# Patient Record
Sex: MALE | Race: OTHER | NOT HISPANIC OR LATINO | Employment: OTHER | URBAN - METROPOLITAN AREA
[De-identification: names, ages, dates, MRNs, and addresses within clinical notes are randomized per-mention and may not be internally consistent; named-entity substitution may affect disease eponyms.]

---

## 2019-06-18 ENCOUNTER — OFFICE VISIT (OUTPATIENT)
Dept: DERMATOLOGY | Facility: CLINIC | Age: 64
End: 2019-06-18
Payer: COMMERCIAL

## 2019-06-18 VITALS — TEMPERATURE: 97.3 F | WEIGHT: 187 LBS | BODY MASS INDEX: 27.7 KG/M2 | HEIGHT: 69 IN

## 2019-06-18 DIAGNOSIS — D48.5 NEOPLASM OF UNCERTAIN BEHAVIOR OF SKIN: Primary | ICD-10-CM

## 2019-06-18 DIAGNOSIS — D23.9 DERMATOFIBROMA: ICD-10-CM

## 2019-06-18 DIAGNOSIS — L82.1 SEBORRHEIC KERATOSIS: ICD-10-CM

## 2019-06-18 PROCEDURE — 99204 OFFICE O/P NEW MOD 45 MIN: CPT | Performed by: DERMATOLOGY

## 2019-06-18 PROCEDURE — 88304 TISSUE EXAM BY PATHOLOGIST: CPT | Performed by: PATHOLOGY

## 2019-06-18 PROCEDURE — 11102 TANGNTL BX SKIN SINGLE LES: CPT | Performed by: DERMATOLOGY

## 2019-06-18 RX ORDER — ASPIRIN 81 MG/1
81 TABLET ORAL DAILY
COMMUNITY

## 2019-06-18 RX ORDER — NITROGLYCERIN 0.4 MG/1
TABLET SUBLINGUAL
Refills: 0 | COMMUNITY
Start: 2019-06-03

## 2019-06-18 RX ORDER — LISINOPRIL 2.5 MG/1
5 TABLET ORAL DAILY
Refills: 0 | COMMUNITY
Start: 2019-06-03 | End: 2021-02-11

## 2019-06-18 RX ORDER — ROSUVASTATIN CALCIUM 40 MG/1
40 TABLET, COATED ORAL DAILY
Refills: 0 | COMMUNITY
Start: 2019-05-21

## 2019-06-18 RX ORDER — EZETIMIBE 10 MG/1
10 TABLET ORAL DAILY
Refills: 0 | COMMUNITY
Start: 2019-06-03

## 2019-06-18 RX ORDER — METOPROLOL SUCCINATE 50 MG/1
TABLET, EXTENDED RELEASE ORAL
Refills: 0 | COMMUNITY
Start: 2019-03-29 | End: 2021-02-11 | Stop reason: SDUPTHER

## 2019-11-21 ENCOUNTER — TRANSCRIBE ORDERS (OUTPATIENT)
Dept: LAB | Facility: CLINIC | Age: 64
End: 2019-11-21

## 2019-11-21 ENCOUNTER — APPOINTMENT (OUTPATIENT)
Dept: LAB | Facility: CLINIC | Age: 64
End: 2019-11-21
Payer: COMMERCIAL

## 2019-11-21 DIAGNOSIS — I25.10 ASCVD (ARTERIOSCLEROTIC CARDIOVASCULAR DISEASE): Primary | ICD-10-CM

## 2019-11-21 DIAGNOSIS — I25.10 ASCVD (ARTERIOSCLEROTIC CARDIOVASCULAR DISEASE): ICD-10-CM

## 2019-11-21 LAB
ALBUMIN SERPL BCP-MCNC: 3.9 G/DL (ref 3.5–5)
ALP SERPL-CCNC: 59 U/L (ref 46–116)
ALT SERPL W P-5'-P-CCNC: 42 U/L (ref 12–78)
ANION GAP SERPL CALCULATED.3IONS-SCNC: 4 MMOL/L (ref 4–13)
AST SERPL W P-5'-P-CCNC: 24 U/L (ref 5–45)
BASOPHILS # BLD AUTO: 0.08 THOUSANDS/ΜL (ref 0–0.1)
BASOPHILS NFR BLD AUTO: 1 % (ref 0–1)
BILIRUB SERPL-MCNC: 0.81 MG/DL (ref 0.2–1)
BUN SERPL-MCNC: 14 MG/DL (ref 5–25)
CALCIUM SERPL-MCNC: 9.2 MG/DL (ref 8.3–10.1)
CHLORIDE SERPL-SCNC: 106 MMOL/L (ref 100–108)
CHOLEST SERPL-MCNC: 133 MG/DL (ref 50–200)
CO2 SERPL-SCNC: 29 MMOL/L (ref 21–32)
CREAT SERPL-MCNC: 0.98 MG/DL (ref 0.6–1.3)
EOSINOPHIL # BLD AUTO: 0.18 THOUSAND/ΜL (ref 0–0.61)
EOSINOPHIL NFR BLD AUTO: 2 % (ref 0–6)
ERYTHROCYTE [DISTWIDTH] IN BLOOD BY AUTOMATED COUNT: 13.2 % (ref 11.6–15.1)
GFR SERPL CREATININE-BSD FRML MDRD: 81 ML/MIN/1.73SQ M
GLUCOSE P FAST SERPL-MCNC: 108 MG/DL (ref 65–99)
HCT VFR BLD AUTO: 47.7 % (ref 36.5–49.3)
HDLC SERPL-MCNC: 48 MG/DL
HGB BLD-MCNC: 16.8 G/DL (ref 12–17)
IMM GRANULOCYTES # BLD AUTO: 0.01 THOUSAND/UL (ref 0–0.2)
IMM GRANULOCYTES NFR BLD AUTO: 0 % (ref 0–2)
LDLC SERPL CALC-MCNC: 64 MG/DL (ref 0–100)
LDLC SERPL DIRECT ASSAY-MCNC: 65 MG/DL (ref 0–100)
LYMPHOCYTES # BLD AUTO: 2.24 THOUSANDS/ΜL (ref 0.6–4.47)
LYMPHOCYTES NFR BLD AUTO: 30 % (ref 14–44)
MCH RBC QN AUTO: 29.9 PG (ref 26.8–34.3)
MCHC RBC AUTO-ENTMCNC: 35.2 G/DL (ref 31.4–37.4)
MCV RBC AUTO: 85 FL (ref 82–98)
MONOCYTES # BLD AUTO: 0.61 THOUSAND/ΜL (ref 0.17–1.22)
MONOCYTES NFR BLD AUTO: 8 % (ref 4–12)
NEUTROPHILS # BLD AUTO: 4.26 THOUSANDS/ΜL (ref 1.85–7.62)
NEUTS SEG NFR BLD AUTO: 59 % (ref 43–75)
NONHDLC SERPL-MCNC: 85 MG/DL
NRBC BLD AUTO-RTO: 0 /100 WBCS
PLATELET # BLD AUTO: 200 THOUSANDS/UL (ref 149–390)
PMV BLD AUTO: 10 FL (ref 8.9–12.7)
POTASSIUM SERPL-SCNC: 4.3 MMOL/L (ref 3.5–5.3)
PROT SERPL-MCNC: 6.9 G/DL (ref 6.4–8.2)
RBC # BLD AUTO: 5.61 MILLION/UL (ref 3.88–5.62)
SODIUM SERPL-SCNC: 139 MMOL/L (ref 136–145)
TRIGL SERPL-MCNC: 107 MG/DL
WBC # BLD AUTO: 7.38 THOUSAND/UL (ref 4.31–10.16)

## 2019-11-21 PROCEDURE — 36415 COLL VENOUS BLD VENIPUNCTURE: CPT

## 2019-11-21 PROCEDURE — 80053 COMPREHEN METABOLIC PANEL: CPT

## 2019-11-21 PROCEDURE — 80061 LIPID PANEL: CPT

## 2019-11-21 PROCEDURE — 85025 COMPLETE CBC W/AUTO DIFF WBC: CPT

## 2019-11-21 PROCEDURE — 83721 ASSAY OF BLOOD LIPOPROTEIN: CPT

## 2019-12-17 ENCOUNTER — OFFICE VISIT (OUTPATIENT)
Dept: FAMILY MEDICINE CLINIC | Facility: CLINIC | Age: 64
End: 2019-12-17
Payer: COMMERCIAL

## 2019-12-17 VITALS
SYSTOLIC BLOOD PRESSURE: 128 MMHG | BODY MASS INDEX: 38.68 KG/M2 | HEIGHT: 60 IN | OXYGEN SATURATION: 97 % | RESPIRATION RATE: 16 BRPM | WEIGHT: 197 LBS | DIASTOLIC BLOOD PRESSURE: 82 MMHG | HEART RATE: 89 BPM | TEMPERATURE: 97.7 F

## 2019-12-17 DIAGNOSIS — E78.5 HYPERLIPIDEMIA, UNSPECIFIED HYPERLIPIDEMIA TYPE: ICD-10-CM

## 2019-12-17 DIAGNOSIS — K40.90 LEFT INGUINAL HERNIA: ICD-10-CM

## 2019-12-17 DIAGNOSIS — Z00.00 WELCOME TO MEDICARE PREVENTIVE VISIT: Primary | ICD-10-CM

## 2019-12-17 DIAGNOSIS — I25.10 CORONARY ARTERY DISEASE INVOLVING NATIVE CORONARY ARTERY OF NATIVE HEART WITHOUT ANGINA PECTORIS: ICD-10-CM

## 2019-12-17 DIAGNOSIS — Z95.1 HX OF CABG: ICD-10-CM

## 2019-12-17 DIAGNOSIS — Z23 IMMUNIZATION DUE: ICD-10-CM

## 2019-12-17 DIAGNOSIS — Z12.5 PROSTATE CANCER SCREENING: ICD-10-CM

## 2019-12-17 DIAGNOSIS — Z95.5 H/O HEART ARTERY STENT: ICD-10-CM

## 2019-12-17 DIAGNOSIS — R73.03 PREDIABETES: ICD-10-CM

## 2019-12-17 PROCEDURE — 4040F PNEUMOC VAC/ADMIN/RCVD: CPT | Performed by: FAMILY MEDICINE

## 2019-12-17 PROCEDURE — 90670 PCV13 VACCINE IM: CPT | Performed by: FAMILY MEDICINE

## 2019-12-17 PROCEDURE — G0009 ADMIN PNEUMOCOCCAL VACCINE: HCPCS | Performed by: FAMILY MEDICINE

## 2019-12-17 PROCEDURE — 99203 OFFICE O/P NEW LOW 30 MIN: CPT | Performed by: FAMILY MEDICINE

## 2019-12-17 PROCEDURE — 3008F BODY MASS INDEX DOCD: CPT | Performed by: FAMILY MEDICINE

## 2019-12-17 PROCEDURE — 3725F SCREEN DEPRESSION PERFORMED: CPT | Performed by: FAMILY MEDICINE

## 2019-12-17 PROCEDURE — G0402 INITIAL PREVENTIVE EXAM: HCPCS | Performed by: FAMILY MEDICINE

## 2019-12-17 NOTE — PROGRESS NOTES
Assessment/Plan:    No problem-specific Assessment & Plan notes found for this encounter  Prediabetes at 89 advised  Recent labs reviewed  Add psa and advise yearly    Declines local cardiologist  If desired and stable, could offer refills for bp and chol meds with labs and f/u in office q6m    CAD/stents stable  Monitor left inguinal hernia but offer surgeon if interested     Diagnoses and all orders for this visit:    Prediabetes    Hyperlipidemia, unspecified hyperlipidemia type    Coronary artery disease involving native coronary artery of native heart without angina pectoris    H/O heart artery stent    Hx of CABG    Prostate cancer screening  -     PSA, Total Screen; Future    Immunization due  -     PNEUMOCOCCAL CONJUGATE VACCINE 13-VALENT GREATER THAN 6 MONTHS    Left inguinal hernia    Welcome to Medicare preventive visit        No follow-ups on file  Subjective:      Patient ID: Landry Cassidy is a 71 y o  male  Chief Complaint   Patient presents with    New patient     wmcma       HPI  No htn, lisinopril and toprol are for his heart per pt  Dr Elina Grey in [facility] cardiology  Sees yearly    No pcp  Was living in [place]  Has f/u for bp since [place] [facility] cardio visit    Medicare at 71 on August 2nd, 4m ago    Last colonoscopy in 2015 [place], normal  Last psa? Had flu shot last month  No pneumonia shots in past    Exercises often, walks  Some weights  Low fat diet    No nocturia usually    Declines eye exam    The following portions of the patient's history were reviewed and updated as appropriate: allergies, current medications, past family history, past medical history, past social history, past surgical history and problem list     Review of Systems   Respiratory: Negative for cough and shortness of breath  Cardiovascular: Negative for chest pain and leg swelling  Gastrointestinal: Negative for blood in stool  Genitourinary: Negative for hematuria           Current Outpatient Medications   Medication Sig Dispense Refill    aspirin (ECOTRIN LOW STRENGTH) 81 mg EC tablet Take 81 mg by mouth daily      ezetimibe (ZETIA) 10 mg tablet Take 10 mg by mouth daily  0    lisinopril (ZESTRIL) 2 5 mg tablet Take 2 5 mg by mouth daily  0    metoprolol succinate (TOPROL-XL) 50 mg 24 hr tablet   0    rosuvastatin (CRESTOR) 40 MG tablet Take 40 mg by mouth daily  0    nitroglycerin (NITROSTAT) 0 4 mg SL tablet place 1 tablet under the tongue if needed  0     No current facility-administered medications for this visit  Objective:    /82   Pulse 89   Temp 97 7 °F (36 5 °C)   Resp 16   Ht 5' (1 524 m)   Wt 89 4 kg (197 lb)   SpO2 97%   BMI 38 47 kg/m²        Physical Exam   Constitutional: He appears well-developed  No distress  HENT:   Head: Normocephalic  Right Ear: External ear normal    Left Ear: External ear normal    Nose: Nose normal    Mouth/Throat: Oropharynx is clear and moist  No oropharyngeal exudate  Eyes: Conjunctivae are normal  No scleral icterus  Neck: Neck supple  Carotid bruit is not present  Cardiovascular: Normal rate, regular rhythm and intact distal pulses  No murmur heard  Pulmonary/Chest: Effort normal and breath sounds normal  No respiratory distress  He has no wheezes  Abdominal: Soft  Bowel sounds are normal  He exhibits no mass  There is no tenderness  There is no guarding  Genitourinary: Rectum normal, prostate normal and penis normal    Musculoskeletal: He exhibits no edema or deformity  Neurological: He is alert  He exhibits normal muscle tone  Skin: Skin is warm and dry  Capillary refill takes less than 2 seconds  No pallor  Psychiatric: He has a normal mood and affect  His behavior is normal  Thought content normal    Nursing note and vitals reviewed               Deep Brandt DO

## 2019-12-17 NOTE — PATIENT INSTRUCTIONS
Please get us a copy of your last colonoscopy report and any immunizations you've had    Medicare Preventive Visit Patient Instructions  Thank you for completing your Welcome to Medicare Visit or Medicare Annual Wellness Visit today  Your next wellness visit will be due in one year (12/18/2020)  The screening/preventive services that you may require over the next 5-10 years are detailed below  Some tests may not apply to you based off risk factors and/or age  Screening tests ordered at today's visit but not completed yet may show as past due  Also, please note that scanned in results may not display below  Preventive Screenings:  Service Recommendations Previous Testing/Comments   Colorectal Cancer Screening  · Colonoscopy    · Fecal Occult Blood Test (FOBT)/Fecal Immunochemical Test (FIT)  · Fecal DNA/Cologuard Test  · Flexible Sigmoidoscopy Age: 54-65 years old   Colonoscopy: every 10 years (May be performed more frequently if at higher risk)  OR  FOBT/FIT: every 1 year  OR  Cologuard: every 3 years  OR  Sigmoidoscopy: every 5 years  Screening may be recommended earlier than age 48 if at higher risk for colorectal cancer  Also, an individualized decision between you and your healthcare provider will decide whether screening between the ages of 74-80 would be appropriate   Colonoscopy: Not on file  FOBT/FIT: Not on file  Cologuard: Not on file  Sigmoidoscopy: Not on file    Risks and Benefits Discussed     Prostate Cancer Screening Individualized decision between patient and health care provider in men between ages of 53-78   Medicare will cover every 12 months beginning on the day after your 50th birthday PSA: No results in last 5 years     Risks and Benefits Discussed     Hepatitis C Screening Once for adults born between [facility]  More frequently in patients at high risk for Hepatitis C Hep C Antibody: Not on file    Patient Declines   Diabetes Screening 1-2 times per year if you're at risk for diabetes or have pre-diabetes Fasting glucose: 108 mg/dL   A1C: No results in last 5 years    Screening Current   Cholesterol Screening Once every 5 years if you don't have a lipid disorder  May order more often based on risk factors  Lipid panel: 11/21/2019    Screening Not Indicated  History Lipid Disorder      Other Preventive Screenings Covered by Medicare:  1  Abdominal Aortic Aneurysm (AAA) Screening: covered once if your at risk  You're considered to be at risk if you have a family history of AAA or a male between the age of 73-68 who smoking at least 100 cigarettes in your lifetime  2  Lung Cancer Screening: covers low dose CT scan once per year if you meet all of the following conditions: (1) Age 50-69; (2) No signs or symptoms of lung cancer; (3) Current smoker or have quit smoking within the last 15 years; (4) You have a tobacco smoking history of at least 30 pack years (packs per day x number of years you smoked); (5) You get a written order from a healthcare provider  3  Glaucoma Screening: covered annually if you're considered high risk: (1) You have diabetes OR (2) Family history of glaucoma OR (3)  aged 48 and older OR (3)  American aged 72 and older  3  Osteoporosis Screening: covered every 2 years if you meet one of the following conditions: (1) Have a vertebral abnormality; (2) On glucocorticoid therapy for more than 3 months; (3) Have primary hyperparathyroidism; (4) On osteoporosis medications and need to assess response to drug therapy  5  HIV Screening: covered annually if you're between the age of 12-76  Also covered annually if you are younger than 13 and older than 72 with risk factors for HIV infection  For pregnant patients, it is covered up to 3 times per pregnancy      Immunizations:  Immunization Recommendations   Influenza Vaccine Annual influenza vaccination during flu season is recommended for all persons aged >= 6 months who do not have contraindications Pneumococcal Vaccine (Prevnar and Pneumovax)  * Prevnar = PCV13  * Pneumovax = PPSV23 Adults 25-60 years old: 1-3 doses may be recommended based on certain risk factors  Adults 72 years old: Prevnar (PCV13) vaccine recommended followed by Pneumovax (PPSV23) vaccine  If already received PPSV23 since turning 65, then PCV13 recommended at least one year after PPSV23 dose  Hepatitis B Vaccine 3 dose series if at intermediate or high risk (ex: diabetes, end stage renal disease, liver disease)   Tetanus (Td) Vaccine - COST NOT COVERED BY MEDICARE PART B Following completion of primary series, a booster dose should be given every 10 years to maintain immunity against tetanus  Td may also be given as tetanus wound prophylaxis  Tdap Vaccine - COST NOT COVERED BY MEDICARE PART B Recommended at least once for all adults  For pregnant patients, recommended with each pregnancy  Shingles Vaccine (Shingrix) - COST NOT COVERED BY MEDICARE PART B  2 shot series recommended in those aged 48 and above     Health Maintenance Due:      Topic Date Due    Hepatitis C Screening  1955    CRC Screening: Colonoscopy  1955     Immunizations Due:      Topic Date Due    DTaP,Tdap,and Td Vaccines (1 - Tdap) 08/02/1965    Pneumococcal Vaccine: 65+ Years (1 of 2 - PCV13) 08/02/2019     Advance Directives   What are advance directives? Advance directives are legal documents that state your wishes and plans for medical care  These plans are made ahead of time in case you lose your ability to make decisions for yourself  Advance directives can apply to any medical decision, such as the treatments you want, and if you want to donate organs  What are the types of advance directives? There are many types of advance directives, and each state has rules about how to use them  You may choose a combination of any of the following:  · Living will: This is a written record of the treatment you want   You can also choose which treatments you do not want, which to limit, and which to stop at a certain time  This includes surgery, medicine, IV fluid, and tube feedings  · Durable power of  for healthcare [facility]): This is a written record that states who you want to make healthcare choices for you when you are unable to make them for yourself  This person, called a proxy, is usually a family member or a friend  You may choose more than 1 proxy  · Do not resuscitate (DNR) order:  A DNR order is used in case your heart stops beating or you stop breathing  It is a request not to have certain forms of treatment, such as CPR  A DNR order may be included in other types of advance directives  · Medical directive: This covers the care that you want if you are in a coma, near death, or unable to make decisions for yourself  You can list the treatments you want for each condition  Treatment may include pain medicine, surgery, blood transfusions, dialysis, IV or tube feedings, and a ventilator (breathing machine)  · Values history: This document has questions about your views, beliefs, and how you feel and think about life  This information can help others choose the care that you would choose  Why are advance directives important? An advance directive helps you control your care  Although spoken wishes may be used, it is better to have your wishes written down  Spoken wishes can be misunderstood, or not followed  Treatments may be given even if you do not want them  An advance directive may make it easier for your family to make difficult choices about your care  Weight Management   Why it is important to manage your weight:  Being overweight increases your risk of health conditions such as heart disease, high blood pressure, type 2 diabetes, and certain types of cancer  It can also increase your risk for osteoarthritis, sleep apnea, and other respiratory problems  Aim for a slow, steady weight loss   Even a small amount of weight loss can lower your risk of health problems  How to lose weight safely:  A safe and healthy way to lose weight is to eat fewer calories and get regular exercise  You can lose up about 1 pound a week by decreasing the number of calories you eat by 500 calories each day  Healthy meal plan for weight management:  A healthy meal plan includes a variety of foods, contains fewer calories, and helps you stay healthy  A healthy meal plan includes the following:  · Eat whole-grain foods more often  A healthy meal plan should contain fiber  Fiber is the part of grains, fruits, and vegetables that is not broken down by your body  Whole-grain foods are healthy and provide extra fiber in your diet  Some examples of whole-grain foods are whole-wheat breads and pastas, oatmeal, brown rice, and bulgur  · Eat a variety of vegetables every day  Include dark, leafy greens such as spinach, kale, federico greens, and mustard greens  Eat yellow and orange vegetables such as carrots, sweet potatoes, and winter squash  · Eat a variety of fruits every day  Choose fresh or canned fruit (canned in its own juice or light syrup) instead of juice  Fruit juice has very little or no fiber  · Eat low-fat dairy foods  Drink fat-free (skim) milk or 1% milk  Eat fat-free yogurt and low-fat cottage cheese  Try low-fat cheeses such as mozzarella and other reduced-fat cheeses  · Choose meat and other protein foods that are low in fat  Choose beans or other legumes such as split peas or lentils  Choose fish, skinless poultry (chicken or turkey), or lean cuts of red meat (beef or pork)  Before you cook meat or poultry, cut off any visible fat  · Use less fat and oil  Try baking foods instead of frying them  Add less fat, such as margarine, sour cream, regular salad dressing and mayonnaise to foods  Eat fewer high-fat foods  Some examples of high-fat foods include french fries, doughnuts, ice cream, and cakes  · Eat fewer sweets    Limit foods and drinks that are high in sugar  This includes candy, cookies, regular soda, and sweetened drinks  Exercise:  Exercise at least 30 minutes per day on most days of the week  Some examples of exercise include walking, biking, dancing, and swimming  You can also fit in more physical activity by taking the stairs instead of the elevator or parking farther away from stores  Ask your healthcare provider about the best exercise plan for you  © Copyright [employer] 2018 Information is for End User's use only and may not be sold, redistributed or otherwise used for commercial purposes   All illustrations and images included in CareNotes® are the copyrighted property of [employer]  or [address] [place] [address]

## 2019-12-18 ENCOUNTER — TELEPHONE (OUTPATIENT)
Dept: FAMILY MEDICINE CLINIC | Facility: CLINIC | Age: 64
End: 2019-12-18

## 2019-12-18 PROBLEM — Z00.00 WELCOME TO MEDICARE PREVENTIVE VISIT: Status: ACTIVE | Noted: 2019-12-18

## 2019-12-18 PROBLEM — Z23 IMMUNIZATION DUE: Status: ACTIVE | Noted: 2019-12-18

## 2019-12-18 NOTE — PROGRESS NOTES
Assessment and Plan:     Problem List Items Addressed This Visit        Active Problems    Coronary artery disease involving native coronary artery    H/O heart artery stent    Hx of CABG    Hyperlipidemia    Immunization due    Relevant Orders    PNEUMOCOCCAL CONJUGATE VACCINE 13-VALENT GREATER THAN 6 MONTHS (Completed)    Left inguinal hernia    Prediabetes    Prostate cancer screening    Relevant Orders    PSA, Total Screen    Welcome to Medicare preventive visit - Primary           Preventive health issues were discussed with patient, and age appropriate screening tests were ordered as noted in patient's After Visit Summary  Personalized health advice and appropriate referrals for health education or preventive services given if needed, as noted in patient's After Visit Summary  History of Present Illness:     Patient presents for Welcome to Medicare visit       Patient Care Team:  DO maegan Dang PCP - General (Family Medicine)     Review of Systems:     Review of Systems   Problem List:     Patient Active Problem List   Diagnosis    Coronary artery disease involving native coronary artery    H/O heart artery stent    Prediabetes    Hx of CABG    Hyperlipidemia    Prostate cancer screening    Left inguinal hernia    Immunization due    Welcome to [facility] preventive visit      Past Medical and Surgical History:     Past Medical History:   Diagnosis Date    Heart problem      Past Surgical History:   Procedure Laterality Date    ARTERIAL BYPASS SURGERY        Family History:     Family History   Problem Relation Age of Onset    Stroke Father     Rectal cancer Father     Alzheimer's disease Mother       Social History:     Social History     Socioeconomic History    Marital status: Single     Spouse name: None    Number of children: None    Years of education: None    Highest education level: None   Occupational History    None   Social Needs    Financial resource strain: None    Food insecurity:     Worry: None     Inability: None    Transportation needs:     Medical: None     Non-medical: None   Tobacco Use    Smoking status: Never Smoker    Smokeless tobacco: Never Used   Substance and Sexual Activity    Alcohol use: None    Drug use: None    Sexual activity: None   Lifestyle    Physical activity:     Days per week: None     Minutes per session: None    Stress: None   Relationships    Social connections:     Talks on phone: None     Gets together: None     Attends Hindu service: None     Active member of club or organization: None     Attends meetings of clubs or organizations: None     Relationship status: None    Intimate partner violence:     Fear of current or ex partner: None     Emotionally abused: None     Physically abused: None     Forced sexual activity: None   Other Topics Concern    None   Social History Narrative    None      Medications and Allergies:     Current Outpatient Medications   Medication Sig Dispense Refill    aspirin (ECOTRIN LOW STRENGTH) 81 mg EC tablet Take 81 mg by mouth daily      ezetimibe (ZETIA) 10 mg tablet Take 10 mg by mouth daily  0    lisinopril (ZESTRIL) 2 5 mg tablet Take 2 5 mg by mouth daily  0    metoprolol succinate (TOPROL-XL) 50 mg 24 hr tablet   0    rosuvastatin (CRESTOR) 40 MG tablet Take 40 mg by mouth daily  0    nitroglycerin (NITROSTAT) 0 4 mg SL tablet place 1 tablet under the tongue if needed  0     No current facility-administered medications for this visit        No Known Allergies   Immunizations:     Immunization History   Administered Date(s) Administered    Influenza, injectable, quadrivalent, preservative free 0 5 mL 10/25/2018    Pneumococcal Conjugate 13-Valent 12/17/2019    influenza, trivalent, adjuvanted 10/30/2019      Health Maintenance:         Topic Date Due    Hepatitis C Screening  1955    CRC Screening: Colonoscopy  1955         Topic Date Due    DTaP,Tdap,and Td Vaccines (1 - Tdap) 08/02/1965    Pneumococcal Vaccine: 65+ Years (1 of 2 - PCV13) 08/02/2019      Medicare Screening Tests and Risk Assessments:     Davis Huang is here for his Welcome to Medicare visit  Health Risk Assessment:   Patient rates overall health as good  Patient feels that their physical health rating is same  Eyesight was rated as slightly worse  Hearing was rated as same  Patient feels that their emotional and mental health rating is same  Pain experienced in the last 7 days has been some  Patient's pain rating has been 3/10  Patient states that he has experienced weight loss or gain in last 6 months  Depression Screening:   PHQ-2 Score: 0      Fall Risk Screening: In the past year, patient has experienced: no history of falling in past year      Home Safety:  Patient does not have trouble with stairs inside or outside of their home  Patient has working smoke alarms and has working carbon monoxide detector  Home safety hazards include: none  Nutrition:   Current diet is Regular  Medications:   Patient is currently taking over-the-counter supplements  OTC medications include: see medication list  Patient is able to manage medications  Activities of Daily Living (ADLs)/Instrumental Activities of Daily Living (IADLs):   Walk and transfer into and out of bed and chair?: Yes  Dress and groom yourself?: Yes    Bathe or shower yourself?: Yes    Feed yourself? Yes  Do your laundry/housekeeping?: Yes  Manage your money, pay your bills and track your expenses?: Yes  Make your own meals?: Yes    Do your own shopping?: Yes    Previous Hospitalizations:   Any hospitalizations or ED visits within the last 12 months?: No      Advance Care Planning:   Living will: Yes    Durable POA for healthcare:  Yes    Advanced directive: Yes    End of Life Decisions reviewed with patient: No      Cognitive Screening:   Provider or family/friend/caregiver concerned regarding cognition?: No    PREVENTIVE SCREENINGS Cardiovascular Screening:    General: Screening Not Indicated and History Lipid Disorder      Diabetes Screening:     General: Screening Current      Colorectal Cancer Screening:     General: Risks and Benefits Discussed      Prostate Cancer Screening:    General: Risks and Benefits Discussed    Due for: PSA      Osteoporosis Screening:    General: Screening Not Indicated      Abdominal Aortic Aneurysm (AAA) Screening:    Risk factors include: age between 73-69 yo        General: Screening Not Indicated      Lung Cancer Screening:     General: Screening Not Indicated      Hepatitis C Screening:    General: Patient Declines    Hep C Screening Accepted: No     Other Counseling Topics:   Alcohol use counseling  No exam data present     Physical Exam:     /82   Pulse 89   Temp 97 7 °F (36 5 °C)   Resp 16   Ht 5' (1 524 m)   Wt 89 4 kg (197 lb)   SpO2 97%   BMI 38 47 kg/m²     Physical Exam   Constitutional: He appears well-nourished  No distress  HENT:   Head: Normocephalic  Eyes: Pupils are equal, round, and reactive to light  Conjunctivae are normal    Neck: No thyromegaly present  Cardiovascular: Normal heart sounds and intact distal pulses  Pulmonary/Chest: No stridor  He has no wheezes  Abdominal: Bowel sounds are normal    Musculoskeletal: He exhibits no tenderness or deformity  Neurological: He exhibits normal muscle tone  Skin: Skin is warm and dry  Capillary refill takes less than 2 seconds  Psychiatric: He has a normal mood and affect       Declines vision exam    Yahir Pruett DO

## 2020-01-02 ENCOUNTER — APPOINTMENT (OUTPATIENT)
Dept: LAB | Facility: CLINIC | Age: 65
End: 2020-01-02
Payer: COMMERCIAL

## 2020-01-02 DIAGNOSIS — Z12.5 PROSTATE CANCER SCREENING: ICD-10-CM

## 2020-01-02 LAB — PSA SERPL-MCNC: 0.7 NG/ML (ref 0–4)

## 2020-01-02 PROCEDURE — G0103 PSA SCREENING: HCPCS

## 2020-01-02 PROCEDURE — 36415 COLL VENOUS BLD VENIPUNCTURE: CPT

## 2020-01-14 ENCOUNTER — TELEPHONE (OUTPATIENT)
Dept: FAMILY MEDICINE CLINIC | Facility: CLINIC | Age: 65
End: 2020-01-14

## 2020-01-14 NOTE — TELEPHONE ENCOUNTER
Message left on the test results hotline this am inquiring about his PSA results from 1/2/20  Dr Jose Irizarry sent him a MyChart message but Lopez Brooke is not able to get in his Mychart       Written by Denise Burroughs DO on 1/3/2020 11:10 PM   Prostate blood test is normal       [email or web address]

## 2020-05-18 ENCOUNTER — TELEPHONE (OUTPATIENT)
Dept: DERMATOLOGY | Facility: CLINIC | Age: 65
End: 2020-05-18

## 2020-07-15 ENCOUNTER — OFFICE VISIT (OUTPATIENT)
Dept: DERMATOLOGY | Facility: CLINIC | Age: 65
End: 2020-07-15
Payer: COMMERCIAL

## 2020-07-15 VITALS — BODY MASS INDEX: 20.39 KG/M2 | WEIGHT: 142.4 LBS | HEIGHT: 70 IN | TEMPERATURE: 97.7 F

## 2020-07-15 DIAGNOSIS — D22.9 MULTIPLE BENIGN MELANOCYTIC NEVI: ICD-10-CM

## 2020-07-15 DIAGNOSIS — L81.4 LENTIGO: ICD-10-CM

## 2020-07-15 DIAGNOSIS — D18.01 CHERRY ANGIOMA: ICD-10-CM

## 2020-07-15 DIAGNOSIS — L82.0 SEBORRHEIC KERATOSES, INFLAMED: Primary | ICD-10-CM

## 2020-07-15 PROCEDURE — 99213 OFFICE O/P EST LOW 20 MIN: CPT | Performed by: DERMATOLOGY

## 2020-07-15 PROCEDURE — 3008F BODY MASS INDEX DOCD: CPT | Performed by: DERMATOLOGY

## 2020-07-15 PROCEDURE — 17110 DESTRUCTION B9 LES UP TO 14: CPT | Performed by: DERMATOLOGY

## 2020-07-15 PROCEDURE — 1160F RVW MEDS BY RX/DR IN RCRD: CPT | Performed by: DERMATOLOGY

## 2020-07-15 NOTE — PATIENT INSTRUCTIONS
1  SEBORRHEIC KERATOSIS; INFLAMED    Assessment and Plan:  Based on a thorough discussion of this condition and the management approach to it (including a comprehensive discussion of the known risks, side effects and potential benefits of treatment), the patient (family) agrees to implement the following specific plan:   Reassured benign will treat the one on the chest with liquid nitrogen due to irritation   If Seborrheic keratosis does not resolved after treating with cryo therapy recommend coming in to have the lesion biopsied  Seborrheic Keratosis  A seborrheic keratosis is a harmless warty spot that appears during adult life as a common sign of skin aging  Seborrheic keratoses can arise on any area of skin, covered or uncovered, with the usual exception of the palms and soles  They do not arise from mucous membranes  Seborrheic keratoses can have highly variable appearance  Seborrheic keratoses are extremely common  It has been estimated that over 90% of adults over the age of 61 years have one or more of them  They occur in males and females of all races, typically beginning to erupt in the 35s or 45s  They are uncommon under the age of 21 years  The precise cause of seborrhoeic keratoses is not known  Seborrhoeic keratoses are considered degenerative in nature  As time goes by, seborrheic keratoses tend to become more numerous  Some people inherit a tendency to develop a very large number of them; some people may have hundreds of them  The name "seborrheic keratosis" is misleading, because these lesions are not limited to a seborrhoeic distribution (scalp, mid-face, chest, upper back), nor are they formed from sebaceous glands, nor are they associated with sebum -- which is greasy    Seborrheic keratosis may also be called "SK," "Seb K," "basal cell papilloma," "senile wart," or "barnacle "      Researchers have noted:   Eruptive seborrhoeic keratoses can follow sunburn or dermatitis   Skin friction may be the reason they appear in body folds   Viral cause (e g , human papillomavirus) seems unlikely   Stable and clonal mutations or activation of FRFR3, PIK3CA, MAURICIO, AKT1 and EGFR genes are found in seborrhoeic keratoses   Seborrhoeic keratosis can arise from solar lentigo   FRFR3 mutations also arise in solar lentigines  These mutations are associated with increased age and location on the head and neck, suggesting a role of ultraviolet radiation in these lesions   Seborrheic keratoses do not harbour tumour suppressor gene mutations   Epidermal growth factor receptor inhibitors, which are used to treat some cancers, often result in an increase in verrucal (warty) keratoses  There is no easy way to remove multiple lesions on a single occasion  Unless a specific lesion is "inflamed" and is causing pain or stinging/burning or is bleeding, most insurance companies do not authorize treatment  PROCEDURE:  DESTRUCTION OF BENIGN LESIONS  After a thorough discussion of treatment options and risk/benefits/alternatives (including but not limited to local pain, scarring, dyspigmentation, blistering, and possible superinfection), verbal and written consent were obtained and the aforementioned lesions were treated on with cryotherapy using liquid nitrogen x 1 cycle for 5-10 seconds  The patient tolerated the procedure well, and after-care instructions were provided  2  CHERRY ANGIOMAS    Assessment and Plan:  Based on a thorough discussion of this condition and the management approach to it (including a comprehensive discussion of the known risks, side effects and potential benefits of treatment), the patient (family) agrees to implement the following specific plan:   Reassured benign    Assessment and Plan:    Cherry angioma, also known as Denise Cabrera spots, are benign vascular skin lesions  A "cherry angioma" is a firm red, blue or purple papule, 0 1-1 cm in diameter   When thrombosed, they can appear black in colour until evaluated with a dermatoscope when the red or purple colour is more easily seen  Cherry angioma may develop on any part of the body but most often appear on the scalp, face, lips and trunk  An angioma is due to proliferating endothelial cells; these are the cells that line the inside of a blood vessel  Angiomas can arise in early life or later in life; the most common type of angioma is a cherry angioma  Cherry angiomas are very common in males and females of any age or race  They are more noticeable in white skin than in skin of colour  They markedly increase in number from about the age of 36  There may be a family history of similar lesions  Eruptive cherry angiomas have been rarely reported to be associated with internal malignancy  The cause of angiomas is unknown  Genetic analysis of cherry angiomas has shown that they frequently carry specific somatic missense mutations in the GNAQ and GNA11 (Q209H) genes, which are involved in other vascular and melanocytic proliferations  Cherry angioma is usually diagnosed clinically and no investigations are necessary for the majority of lesions  It has a characteristic red-clod or lobular pattern on dermatoscopy (called lacunar pattern using conventional pattern analysis)  When there is uncertainty about the diagnosis, a biopsy may be performed  The angioma is composed of venules in a thickened papillary dermis  Collagen bundles may be prominent between the lobules  Cherry angiomas are harmless, so they do not usually have to be treated  Occasionally, they are removed to exclude a malignant skin lesion such as a nodular melanoma or because they are irritated or bleeding (and a subsequent risk for infection)  To decrease friction over the lesions, we recommend Neutrogena Daily Defense SPF 50+ at least 3 times a day        3  MELANOCYTIC NEVI ("Moles")    Assessment and Plan:  Based on a thorough discussion of this condition and the management approach to it (including a comprehensive discussion of the known risks, side effects and potential benefits of treatment), the patient (family) agrees to implement the following specific plan:   Reassured benign,   When outside we recommend using a wide brim hat, sunglasses, long sleeve and pants, sunscreen with SPF 31+ with reapplication every 2 hours, or SPF specific clothing      Melanocytic Nevi  Melanocytic nevi ("moles") are caused by collections of the color producing skin cells, or melanocytes, in 1 area in the skin  They can range in color from pink to dark brown and be either raised or flat  Some moles are present at birth (I e , "congenital nevi"), while others come up later in life (i e , "acquired nevi")  [place] exposure also stimulates the body to make more moles, ie the more sun you get the more moles you'll grow  Clinically distinguishing a healthy mole from melanoma may be difficult  The "ABCDE's" of moles have been suggested as a means of helping to alert a person to a suspicious mole and the possible increased risk of melanoma  Asymmetry: Healthy moles tend to be symmetric, while melanomas are often asymmetric  Asymmetry means if you draw a line through the mole, the two halves do not match in color, size, shape, or surface texture Any mole that starts to demonstrate "asymmetry" should be examined promptly by a board certified dermatologist      Border: Healthy moles tend to have discrete, even borders  The border of a melanoma often blends into the normal skin and does not sharply delineate the mole from normal skin  Any mole that starts to demonstrate "uneven borders" should be examined promptly     Color: Healthy moles tend to be one color throughout  Melanomas tend to be made up of different colors ranging from dark black, blue, white, or red    Any mole that demonstrates a color change should be examined promptly    Diameter: Healthy moles tend to be smaller than 0 6 cm in size; an exception are "congenital nevi" that can be larger  Melanomas tend to grow and can often be greater than 0 6 cm (1/4 of an inch, or the size of a pencil eraser)  This is only a guideline, and many normal moles may be larger than 0 6 cm without being unhealthy  Any mole that starts to change in size (small to bigger or bigger to smaller) should be examined promptly    Evolving: Healthy moles tend to "stay the same "  Melanomas may often show signs of change or evolution such as a change in size, shape, color, or elevation  Any mole that starts to itch, bleed, crust, burn, hurt, or ulcerate or demonstrate a change or evolution should be examined promptly by a board certified dermatologist       What are atypical moles or dysplastic nevi? Dysplastic moles are moles that have some of the ABCDE  changes listed above but  are not cancerous  Sometimes a biopsy and microscopic examination are needed to determine the difference  They may indicate an increased risk of melanoma in that person, especially if there is a family history of melanoma  What is a Melanoma? The main concern when looking at a new or changing mole it to evaluate whether it may be a melanoma  The appearance of a "new mole" remains one of the most reliable methods for identifying a malignant melanoma  A melanoma is a type of skin cancer that can be deadly if it spreads throughout the body  The prognosis of a Melanoma depends on how deep it has penetrated in the skin  If caught early, they generally will not have had time to grow into the deeper layers of the skin and they cure rate is then very high  Once the melanoma grows deeper into the skin, the cure rate drops dramatically  Therefore, early detection and removal of a malignant melanoma results in a much better chance of complete cure       4  LENTIGO    Assessment and Plan:  Based on a thorough discussion of this condition and the management approach to it (including a comprehensive discussion of the known risks, side effects and potential benefits of treatment), the patient (family) agrees to implement the following specific plan:   Reassured benign   Recommend wearing sun protective clothing   Recommend continuing with yearly skin exam     What is a lentigo? A lentigo is a pigmented flat or slightly raised lesion with a clearly defined edge  Unlike an ephelis (freckle), it does not fade in the winter months  There are several kinds of lentigo  The name lentigo originally referred to its appearance resembling a small lentil  The plural of lentigo is lentigines, although lentigos is also in common use  Who gets lentigines? Lentigines can affect males and females of all ages and races  Solar lentigines are especially prevalent in fair skinned adults  Lentigines associated with syndromes are present at birth or arise during childhood  What causes lentigines? Common forms of lentigo are due to exposure to ultraviolet radiation:   Sun damage including sunburn    Indoor tanning    Phototherapy, especially photochemotherapy (PUVA)    Ionizing radiation, eg radiation therapy, can also cause lentigines  Several familial syndromes associated with widespread lentigines originate from mutations in Jona-MAP kinase, mTOR signaling and PTEN pathways  What are the clinical features of lentigines? Lentigines have been classified into several different types depending on what they look like, where they appear on the body, causative factors, and whether they are associated to other diseases or conditions  Lentigines may be solitary or more often, multiple  Most lentigines are smaller than 5 mm in diameter      Lentigo simplex   A precursor to junctional naevus    Arises during childhood and early adult life    Found on trunk and limbs    Small brown round or oval macule or thin plaque    Jagged or smooth edge    May have a dry surface    May disappear in time  Solar lentigo   A precursor to seborrhoeic keratosis    Found on chronically sun exposed sites such as hands, face, lower legs    May also follow sunburn to shoulders    Yellow, light or dark brown regular or irregular macule or thin plaque    May have a dry surface    Often has moth-eaten outline    Can slowly enlarge to several centimeters in diameter    May disappear, often through the process known as lichenoid keratosis    When atypical in appearance, may be difficult to distinguish from melanoma in situ  Ink spot lentigo   Also known as reticulated lentigo    Few in number compared to solar lentigines    Follows sunburn in very fair skinned individuals    Dark brown to black irregular ink spot-like macule  PUVA lentigo   Similar to ink spot lentigo but follows photochemotherapy (PUVA)    Location anywhere exposed to PUVA  Tanning bed lentigo   Similar to ink spot lentigo but follows indoor tanning    Location anywhere exposed to tanning bed  Radiation lentigo   Occurs in site of irradiation (accidental or therapeutic)    Associated with late-stage radiation dermatitis: epidermal atrophy, subcutaneous fibrosis, keratosis, telangiectasias  Melanotic macule   Mucosal surfaces or adjacent glabrous skin eg lip, vulva, penis, anus    Light to dark brown    Also called mucosal melanosis  Generalised lentigines   Found on any exposed or covered site from early childhood    Small macules may merge to form larger patches    Not associated with a syndrome    Also called lentigines profusa, multiple lentigines  Agminated lentigines   Naevoid eruption of lentigos confined to a single segmental area    Sharp demarcation in midline    May have associated neurological and developmental abnormalities  Patterned lentigines   Inherited tendency to lentigines on face, lips, buttocks, palms, soles    Recognised mainly in people of  ethnicity  Centrofacial neurodysraphic lentiginosis  Associated with mental retardation  Lentiginosis syndromes   Syndromes include LEOPARD/[place], Peutz-Jeghers, Laugier-Hunziker, Moynahan, Xeroderma pigmentosum, myxoma syndromes (HINSON, NAME, Walters), Ruvalcaba-Myhre-Barrow, Bannayan-Zonnana syndrome, Cowden disease (multiple hamartoma syndrome )    Inheritance is autosomal dominant; sporadic cases common    Widespread lentigines present at birth or arise in early childhood    Associated with neural, endocrine, and mesenchymal tumors    How is the diagnosis made? Lentigines are usually diagnosed clinically by their typical appearance  Concern regarding possibility of melanoma may lead to:   Dermatoscopy    Diagnostic excision biopsy    Histopathology of a lentigo shows:   Thickened epidermis    An increased number of melanocytes along the basal layer of epidermis    Unlike junctional melanocytic naevus, there are no nests of melanocytes    Increased melanin pigment within the keratinocytes    Additional features depending on type of lentigo    In contrast, an ephelis (freckle) shows sun-induced increased melanin within the keratinocytes, without an increase in number of cells  What is the treatment for lentigines? Most lentigines are left alone  Attempts to lighten them may not be successful  The following approaches are used:   SPF 50+ broad-spectrum sunscreen    Hydroquinone bleaching cream    Alpha hydroxy acids    Vitamin C    Retinoids    Azelaic acid    Chemical peels  Individual lesions can be permanently removed using:   Cryotherapy    Intense pulsed light    Pigment lasers    How can lentigines be prevented? Lentigines associated with exposure ultraviolet radiation can be prevented by very careful sun protection  Clothing is more successful at preventing new lentigines than are sunscreens  What is the outlook for lentigines? Lentigines usually persist  They may increase in number with age and sun exposure   Some in sun-protected sites may fade and disappear

## 2020-07-15 NOTE — PROGRESS NOTES
[facility] Dermatology [facility] Note     Patient Name: Chelsea Rodriguez  Encounter Date: 7 15 2020     Have you been cared for by a [facility] Dermatologist in the last 3 years and, if so, which one? YES, Dr Shruthi Mejia    · Have you traveled outside of the [address] [place] [address] in the past 3 months or outside of the [facility] area in the last 2 weeks? No     May we call your Preferred Phone number to discuss your specific medical information? Yes     May we leave a detailed message that includes your specific medical information? Yes      Today's Chief Concerns:   Concern #1:  Skin exam   Concern #2:  Some lesions on body of concern    Past Medical History:  Have you personally ever had or currently have any of the following? · Skin cancer (such as Melanoma, Basal Cell Carcinoma, Squamous Cell Carcinoma? (If Yes, please provide more detail)- No  · Eczema: No  · Psoriasis: No  · HIV/AIDS: No  · Hepatitis B or C: No  · Tuberculosis: No  · Systemic Immunosuppression such as Diabetes, Biologic or Immunotherapy, Chemotherapy, Organ Transplantation, Bone Marrow Transplantation (If YES, please provide more detail): No  · Radiation Treatment (If YES, please provide more detail): No  · Any other major medical conditions/concerns? (If Yes, which types)- YES, heart problems    Social History:     What is/was your primary occupation? retired     What are your hobbies/past-times? Skiing, mountain biking, golfing    Family History:  Have any of your "first degree relatives" (parent, brother, sister, or child) had any of the following       · Skin cancer such as Melanoma or Merkel Cell Carcinoma or Pancreatic Cancer? YES, skin cancer  · Eczema, Asthma, Hay Fever or Seasonal Allergies: No  · Psoriasis or Psoriatic Arthritis: No  · Do any other medical conditions seem to run in your family? If Yes, what condition and which relatives?   No    Current Medications:   (please update all dermatological medications before printing patient's AVS!)      Current Outpatient Medications:     aspirin (ECOTRIN LOW STRENGTH) 81 mg EC tablet, Take 81 mg by mouth daily, Disp: , Rfl:     ezetimibe (ZETIA) 10 mg tablet, Take 10 mg by mouth daily, Disp: , Rfl: 0    lisinopril (ZESTRIL) 2 5 mg tablet, Take 5 mg by mouth daily , Disp: , Rfl: 0    metoprolol succinate (TOPROL-XL) 50 mg 24 hr tablet, , Disp: , Rfl: 0    nitroglycerin (NITROSTAT) 0 4 mg SL tablet, , Disp: , Rfl: 0    rosuvastatin (CRESTOR) 40 MG tablet, Take 40 mg by mouth daily, Disp: , Rfl: 0      Review of Systems:  Have you recently had or currently have any of the following? If YES, what are you doing for the problem? · Fever, chills or unintended weight loss: No  · Sudden loss or change in your vision: No  · Nausea, vomiting or blood in your stool: No  · Painful or swollen joints: No  · Wheezing or cough: No  · Changing mole or non-healing wound: No  · Nosebleeds: No  · Excessive sweating: No  · Easy or prolonged bleeding? No  · Over the last 2 weeks, how often have you been bothered by the following problems? · Taking little interest or pleasure in doing things: 1 - Not at All  · Feeling down, depressed, or hopeless: 1 - Not at All  · Rapid heartbeat with epinephrine:  No    · FEMALES ONLY:    · Are you pregnant or planning to become pregnant? N/A  · Are you currently or planning to be nursing or breast feeding? N/A    · Any known allergies? · No Known Allergies      Physical Exam:     Was a chaperone (Derm Clinical Assistant) present throughout the entire Physical Exam? Yes     Did the Dermatology Team specifically  the patient on the importance of a Full Skin Exam to be sure that nothing is missed clinically?  Yes}  o Did the patient ultimately request or accept a Full Skin Exam?  Yes  o Did the patient specifically refuse to have the areas "under-the-bra" examined by the Dermatologist? No  o Did the patient specifically refuse to have the areas "under-the-underwear" examined by the Dermatologist? No    CONSTITUTIONAL:   Vitals:    07/15/20 1022   Temp: 97 7 °F (36 5 °C)   TempSrc: Tympanic   Weight: 64 6 kg (142 lb 6 4 oz)   Height: 5' 9 5" (1 765 m)       PSYCH: Normal mood and affect  EYES: Normal conjunctiva  ENT: Normal lips and oral mucosa  CARDIOVASCULAR: No edema  RESPIRATORY: Normal respirations  HEME/LYMPH/IMMUNO:  No regional lymphadenopathy except as noted below in "ASSESSMENT AND PLAN BY DIAGNOSIS"    SKIN:  FULL ORGAN SYSTEM EXAM   Hair, Scalp, Ears, Face Normal except as noted below in Assessment   Neck, Cervical Chain Nodes Normal except as noted below in Assessment   Right Arm/Hand/Fingers Normal except as noted below in Assessment   Left Arm/Hand/Fingers Normal except as noted below in Assessment   Chest/Breasts/Axillae Viewed areas Normal except as noted below in Assessment   Abdomen, Umbilicus Normal except as noted below in Assessment   Back/Spine Normal except as noted below in Assessment   Groin/Genitalia/Buttocks Normal except as noted below in Assessment   Right Leg, Foot, Toes Normal except as noted below in Assessment   Left Leg, Foot, Toes Normal except as noted below in Assessment        Assessment and Plan by Diagnosis:    History of Present Condition:     Duration:  How long has this been an issue for you? o  6 month   Location Affected:  Where on the body is this affecting you?    o  right chest, neck, left posterior shoulder   Quality:  Is there any bleeding, pain, itch, burning/irritation, or redness associated with the skin lesion? o  denies   Severity:  Describe any bleeding, pain, itch, burning/irritation, or redness on a scale of 1 to 10 (with 10 being the worst)  o  denies   Timing:  Does this condition seem to be there pretty constantly or do you notice it more at specific times throughout the day?     o  constant   Context:  Have you ever noticed that this condition seems to be associated with specific activities you do?    o  denies   Modifying Factors:    o Anything that seems to make the condition worse?    -  denies  o What have you tried to do to make the condition better?    -  denies   Associated Signs and Symptoms:  Does this skin lesion seem to be associated with any of the followin  SEBORRHEIC KERATOSIS; INFLAMED    Physical Exam:   Anatomic Location Affected:  Chest, neck, left upper back   Morphological Description:  Flat and raised, waxy, smooth to warty textured, yellow to brownish-grey to dark brown to blackish, discrete, "stuck-on" appearing papules   Pertinent Positives:   Pertinent Negatives: Additional History of Present Condition:  Patient reports new bumps on the skin  Itches  Present constantly; nothing seems to make it worse or better  No prior treatment  Assessment and Plan:  Based on a thorough discussion of this condition and the management approach to it (including a comprehensive discussion of the known risks, side effects and potential benefits of treatment), the patient (family) agrees to implement the following specific plan:   Reassured benign will treat the one on the chest with liquid nitrogen due to irritation   If Seborrheic keratosis does not resolved after treating with cryo therapy recommend coming in to have the lesion biopsied  Seborrheic Keratosis  A seborrheic keratosis is a harmless warty spot that appears during adult life as a common sign of skin aging  Seborrheic keratoses can arise on any area of skin, covered or uncovered, with the usual exception of the palms and soles  They do not arise from mucous membranes  Seborrheic keratoses can have highly variable appearance  Seborrheic keratoses are extremely common  It has been estimated that over 90% of adults over the age of 61 years have one or more of them  They occur in males and females of all races, typically beginning to erupt in the 35s or 45s   They are uncommon under the age of 21 years  The precise cause of seborrhoeic keratoses is not known  Seborrhoeic keratoses are considered degenerative in nature  As time goes by, seborrheic keratoses tend to become more numerous  Some people inherit a tendency to develop a very large number of them; some people may have hundreds of them  The name "seborrheic keratosis" is misleading, because these lesions are not limited to a seborrhoeic distribution (scalp, mid-face, chest, upper back), nor are they formed from sebaceous glands, nor are they associated with sebum -- which is greasy  Seborrheic keratosis may also be called "SK," "Seb K," "basal cell papilloma," "senile wart," or "barnacle "      Researchers have noted:   Eruptive seborrhoeic keratoses can follow sunburn or dermatitis   Skin friction may be the reason they appear in body folds   Viral cause (e g , human papillomavirus) seems unlikely   Stable and clonal mutations or activation of FRFR3, PIK3CA, MAURICIO, AKT1 and EGFR genes are found in seborrhoeic keratoses   Seborrhoeic keratosis can arise from solar lentigo   FRFR3 mutations also arise in solar lentigines  These mutations are associated with increased age and location on the head and neck, suggesting a role of ultraviolet radiation in these lesions   Seborrheic keratoses do not harbour tumour suppressor gene mutations   Epidermal growth factor receptor inhibitors, which are used to treat some cancers, often result in an increase in verrucal (warty) keratoses  There is no easy way to remove multiple lesions on a single occasion  Unless a specific lesion is "inflamed" and is causing pain or stinging/burning or is bleeding, most insurance companies do not authorize treatment      PROCEDURE:  DESTRUCTION OF BENIGN LESIONS  After a thorough discussion of treatment options and risk/benefits/alternatives (including but not limited to local pain, scarring, dyspigmentation, blistering, and possible superinfection), verbal and written consent were obtained and the aforementioned lesions were treated on with cryotherapy using liquid nitrogen x 3 cycle for 5-10 seconds   TOTAL NUMBER of 1 lesions were treated today on the ANATOMIC LOCATION: chest      The patient tolerated the procedure well, and after-care instructions were provided  2  CHERRY ANGIOMAS    Physical Exam:   Anatomic Location Affected:  Scalp and trunk   Morphological Description:  Scattered cherry red, 1-4 mm papules  Additional History of Present Condition:  Present for years    Assessment and Plan:  Based on a thorough discussion of this condition and the management approach to it (including a comprehensive discussion of the known risks, side effects and potential benefits of treatment), the patient (family) agrees to implement the following specific plan:   Reassured benign    Assessment and Plan:    Cherry angioma, also known as Lavetta Snooks spots, are benign vascular skin lesions  A "cherry angioma" is a firm red, blue or purple papule, 0 1-1 cm in diameter  When thrombosed, they can appear black in colour until evaluated with a dermatoscope when the red or purple colour is more easily seen  Cherry angioma may develop on any part of the body but most often appear on the scalp, face, lips and trunk  An angioma is due to proliferating endothelial cells; these are the cells that line the inside of a blood vessel  Angiomas can arise in early life or later in life; the most common type of angioma is a cherry angioma  Cherry angiomas are very common in males and females of any age or race  They are more noticeable in white skin than in skin of colour  They markedly increase in number from about the age of 36  There may be a family history of similar lesions  Eruptive cherry angiomas have been rarely reported to be associated with internal malignancy  The cause of angiomas is unknown   Genetic analysis of cherry angiomas has shown that they frequently carry specific somatic missense mutations in the GNAQ and GNA11 (Q209H) genes, which are involved in other vascular and melanocytic proliferations  Cherry angioma is usually diagnosed clinically and no investigations are necessary for the majority of lesions  It has a characteristic red-clod or lobular pattern on dermatoscopy (called lacunar pattern using conventional pattern analysis)  When there is uncertainty about the diagnosis, a biopsy may be performed  The angioma is composed of venules in a thickened papillary dermis  Collagen bundles may be prominent between the lobules  Cherry angiomas are harmless, so they do not usually have to be treated  Occasionally, they are removed to exclude a malignant skin lesion such as a nodular melanoma or because they are irritated or bleeding (and a subsequent risk for infection)  To decrease friction over the lesions, we recommend Neutrogena Daily Defense SPF 50+ at least 3 times a day  3  MELANOCYTIC NEVI ("Moles")    Physical Exam:   Anatomic Location Affected:   Mostly on sun-exposed areas of the trunk and extremities   Morphological Description:  Scattered, 1-4mm round to ovoid, symmetrical-appearing, even bordered, skin colored to dark brown macules/papules, mostly in sun-exposed areas   Pertinent Positives:   Pertinent Negatives:     Additional History of Present Condition:  Present for years    Assessment and Plan:  Based on a thorough discussion of this condition and the management approach to it (including a comprehensive discussion of the known risks, side effects and potential benefits of treatment), the patient (family) agrees to implement the following specific plan:   Reassured benign,   When outside we recommend using a wide brim hat, sunglasses, long sleeve and pants, sunscreen with SPF 51+ with reapplication every 2 hours, or SPF specific clothing      Melanocytic Nevi  Melanocytic nevi ("moles") are caused by collections of the color producing skin cells, or melanocytes, in 1 area in the skin  They can range in color from pink to dark brown and be either raised or flat  Some moles are present at birth (I e , "congenital nevi"), while others come up later in life (i e , "acquired nevi")  Akbar Salter exposure also stimulates the body to make more moles, ie the more sun you get the more moles you'll grow  Clinically distinguishing a healthy mole from melanoma may be difficult  The "ABCDE's" of moles have been suggested as a means of helping to alert a person to a suspicious mole and the possible increased risk of melanoma  Asymmetry: Healthy moles tend to be symmetric, while melanomas are often asymmetric  Asymmetry means if you draw a line through the mole, the two halves do not match in color, size, shape, or surface texture Any mole that starts to demonstrate "asymmetry" should be examined promptly by a board certified dermatologist      Border: Healthy moles tend to have discrete, even borders  The border of a melanoma often blends into the normal skin and does not sharply delineate the mole from normal skin  Any mole that starts to demonstrate "uneven borders" should be examined promptly     Color: Healthy moles tend to be one color throughout  Melanomas tend to be made up of different colors ranging from dark black, blue, white, or red  Any mole that demonstrates a color change should be examined promptly    Diameter: Healthy moles tend to be smaller than 0 6 cm in size; an exception are "congenital nevi" that can be larger  Melanomas tend to grow and can often be greater than 0 6 cm (1/4 of an inch, or the size of a pencil eraser)  This is only a guideline, and many normal moles may be larger than 0 6 cm without being unhealthy    Any mole that starts to change in size (small to bigger or bigger to smaller) should be examined promptly    Evolving: Healthy moles tend to "stay the same "  Melanomas may often show signs of change or evolution such as a change in size, shape, color, or elevation  Any mole that starts to itch, bleed, crust, burn, hurt, or ulcerate or demonstrate a change or evolution should be examined promptly by a board certified dermatologist       What are atypical moles or dysplastic nevi? Dysplastic moles are moles that have some of the ABCDE  changes listed above but  are not cancerous  Sometimes a biopsy and microscopic examination are needed to determine the difference  They may indicate an increased risk of melanoma in that person, especially if there is a family history of melanoma  What is a Melanoma? The main concern when looking at a new or changing mole it to evaluate whether it may be a melanoma  The appearance of a "new mole" remains one of the most reliable methods for identifying a malignant melanoma  A melanoma is a type of skin cancer that can be deadly if it spreads throughout the body  The prognosis of a Melanoma depends on how deep it has penetrated in the skin  If caught early, they generally will not have had time to grow into the deeper layers of the skin and they cure rate is then very high  Once the melanoma grows deeper into the skin, the cure rate drops dramatically  Therefore, early detection and removal of a malignant melanoma results in a much better chance of complete cure  4  LENTIGO    Physical Exam:   Anatomic Location Affected:  Trunk and extremities   Morphological Description:  Tan and brown macules   Pertinent Positives:   Pertinent Negatives: Additional History of Present Condition:  Present for years    Assessment and Plan:  Based on a thorough discussion of this condition and the management approach to it (including a comprehensive discussion of the known risks, side effects and potential benefits of treatment), the patient (family) agrees to implement the following specific plan:   Reassured benign     Recommend wearing sun protective clothing   Recommend continuing with yearly skin exam     What is a lentigo? A lentigo is a pigmented flat or slightly raised lesion with a clearly defined edge  Unlike an ephelis (freckle), it does not fade in the winter months  There are several kinds of lentigo  The name lentigo originally referred to its appearance resembling a small lentil  The plural of lentigo is lentigines, although lentigos is also in common use  Who gets lentigines? Lentigines can affect males and females of all ages and races  Solar lentigines are especially prevalent in fair skinned adults  Lentigines associated with syndromes are present at birth or arise during childhood  What causes lentigines? Common forms of lentigo are due to exposure to ultraviolet radiation:   Sun damage including sunburn    Indoor tanning    Phototherapy, especially photochemotherapy (PUVA)    Ionizing radiation, eg radiation therapy, can also cause lentigines  Several familial syndromes associated with widespread lentigines originate from mutations in Jona-MAP kinase, mTOR signaling and PTEN pathways  What are the clinical features of lentigines? Lentigines have been classified into several different types depending on what they look like, where they appear on the body, causative factors, and whether they are associated to other diseases or conditions  Lentigines may be solitary or more often, multiple  Most lentigines are smaller than 5 mm in diameter      Lentigo simplex   A precursor to junctional naevus    Arises during childhood and early adult life    Found on trunk and limbs    Small brown round or oval macule or thin plaque    Jagged or smooth edge    May have a dry surface    May disappear in time  Solar lentigo   A precursor to seborrhoeic keratosis    Found on chronically sun exposed sites such as hands, face, lower legs    May also follow sunburn to shoulders    Yellow, light or dark brown regular or irregular macule or thin plaque    May have a dry surface    Often has moth-eaten outline    Can slowly enlarge to several centimeters in diameter    May disappear, often through the process known as lichenoid keratosis    When atypical in appearance, may be difficult to distinguish from melanoma in situ  Ink spot lentigo   Also known as reticulated lentigo    Few in number compared to solar lentigines    Follows sunburn in very fair skinned individuals    Dark brown to black irregular ink spot-like macule  PUVA lentigo   Similar to ink spot lentigo but follows photochemotherapy (PUVA)    Location anywhere exposed to PUVA  Tanning bed lentigo   Similar to ink spot lentigo but follows indoor tanning    Location anywhere exposed to tanning bed  Radiation lentigo   Occurs in site of irradiation (accidental or therapeutic)    Associated with late-stage radiation dermatitis: epidermal atrophy, subcutaneous fibrosis, keratosis, telangiectasias  Melanotic macule   Mucosal surfaces or adjacent glabrous skin eg lip, vulva, penis, anus    Light to dark brown    Also called mucosal melanosis  Generalised lentigines   Found on any exposed or covered site from early childhood    Small macules may merge to form larger patches    Not associated with a syndrome    Also called lentigines profusa, multiple lentigines  Agminated lentigines   Naevoid eruption of lentigos confined to a single segmental area    Sharp demarcation in midline    May have associated neurological and developmental abnormalities  Patterned lentigines   Inherited tendency to lentigines on face, lips, buttocks, palms, soles    Recognised mainly in people of  ethnicity  Centrofacial neurodysraphic lentiginosis  Associated with mental retardation  Lentiginosis syndromes   Syndromes include LEOPARD/[place], Peutz-Jeghers, Laugier-Hunziker, Moynahan, Xeroderma pigmentosum, myxoma syndromes (HINSON, NAME, Walters), Ruvalcaba-Myhre-Barrow, Bannayan-Zonnana syndrome, Cowden disease (multiple hamartoma syndrome )    Inheritance is autosomal dominant; sporadic cases common    Widespread lentigines present at birth or arise in early childhood    Associated with neural, endocrine, and mesenchymal tumors    How is the diagnosis made? Lentigines are usually diagnosed clinically by their typical appearance  Concern regarding possibility of melanoma may lead to:   Dermatoscopy    Diagnostic excision biopsy    Histopathology of a lentigo shows:   Thickened epidermis    An increased number of melanocytes along the basal layer of epidermis    Unlike junctional melanocytic naevus, there are no nests of melanocytes    Increased melanin pigment within the keratinocytes    Additional features depending on type of lentigo    In contrast, an ephelis (freckle) shows sun-induced increased melanin within the keratinocytes, without an increase in number of cells  What is the treatment for lentigines? Most lentigines are left alone  Attempts to lighten them may not be successful  The following approaches are used:   SPF 50+ broad-spectrum sunscreen    Hydroquinone bleaching cream    Alpha hydroxy acids    Vitamin C    Retinoids    Azelaic acid    Chemical peels  Individual lesions can be permanently removed using:   Cryotherapy    Intense pulsed light    Pigment lasers    How can lentigines be prevented? Lentigines associated with exposure ultraviolet radiation can be prevented by very careful sun protection  Clothing is more successful at preventing new lentigines than are sunscreens  What is the outlook for lentigines? Lentigines usually persist  They may increase in number with age and sun exposure  Some in sun-protected sites may fade and disappear    Scribe Attestation    I,:   Sanchez Staley am acting as a scribe while in the presence of the attending physician :        I,:   Vernell Gonsales MD personally performed the services described in this documentation    as scribed in my presence :

## 2020-11-12 ENCOUNTER — TRANSCRIBE ORDERS (OUTPATIENT)
Dept: LAB | Facility: CLINIC | Age: 65
End: 2020-11-12

## 2020-11-12 ENCOUNTER — LAB (OUTPATIENT)
Dept: LAB | Facility: CLINIC | Age: 65
End: 2020-11-12
Payer: COMMERCIAL

## 2020-11-12 DIAGNOSIS — I25.10 ATHEROSCLEROSIS OF NATIVE CORONARY ARTERY OF NATIVE HEART WITHOUT ANGINA PECTORIS: ICD-10-CM

## 2020-11-12 DIAGNOSIS — I25.10 ATHEROSCLEROSIS OF NATIVE CORONARY ARTERY OF NATIVE HEART WITHOUT ANGINA PECTORIS: Primary | ICD-10-CM

## 2020-11-12 LAB
ALBUMIN SERPL BCP-MCNC: 3.9 G/DL (ref 3.5–5)
ALP SERPL-CCNC: 54 U/L (ref 46–116)
ALT SERPL W P-5'-P-CCNC: 34 U/L (ref 12–78)
ANION GAP SERPL CALCULATED.3IONS-SCNC: 3 MMOL/L (ref 4–13)
AST SERPL W P-5'-P-CCNC: 19 U/L (ref 5–45)
BASOPHILS # BLD AUTO: 0.05 THOUSANDS/ΜL (ref 0–0.1)
BASOPHILS NFR BLD AUTO: 1 % (ref 0–1)
BILIRUB SERPL-MCNC: 0.95 MG/DL (ref 0.2–1)
BUN SERPL-MCNC: 20 MG/DL (ref 5–25)
CALCIUM SERPL-MCNC: 8.9 MG/DL (ref 8.3–10.1)
CHLORIDE SERPL-SCNC: 108 MMOL/L (ref 100–108)
CHOLEST SERPL-MCNC: 126 MG/DL (ref 50–200)
CO2 SERPL-SCNC: 30 MMOL/L (ref 21–32)
CREAT SERPL-MCNC: 0.9 MG/DL (ref 0.6–1.3)
EOSINOPHIL # BLD AUTO: 0.18 THOUSAND/ΜL (ref 0–0.61)
EOSINOPHIL NFR BLD AUTO: 3 % (ref 0–6)
ERYTHROCYTE [DISTWIDTH] IN BLOOD BY AUTOMATED COUNT: 12.7 % (ref 11.6–15.1)
GFR SERPL CREATININE-BSD FRML MDRD: 89 ML/MIN/1.73SQ M
GLUCOSE P FAST SERPL-MCNC: 102 MG/DL (ref 65–99)
HCT VFR BLD AUTO: 46 % (ref 36.5–49.3)
HDLC SERPL-MCNC: 58 MG/DL
HGB BLD-MCNC: 16.6 G/DL (ref 12–17)
IMM GRANULOCYTES # BLD AUTO: 0.01 THOUSAND/UL (ref 0–0.2)
IMM GRANULOCYTES NFR BLD AUTO: 0 % (ref 0–2)
LDLC SERPL CALC-MCNC: 45 MG/DL (ref 0–100)
LDLC SERPL DIRECT ASSAY-MCNC: 63 MG/DL (ref 0–100)
LYMPHOCYTES # BLD AUTO: 2.08 THOUSANDS/ΜL (ref 0.6–4.47)
LYMPHOCYTES NFR BLD AUTO: 33 % (ref 14–44)
MCH RBC QN AUTO: 30.7 PG (ref 26.8–34.3)
MCHC RBC AUTO-ENTMCNC: 36.1 G/DL (ref 31.4–37.4)
MCV RBC AUTO: 85 FL (ref 82–98)
MONOCYTES # BLD AUTO: 0.53 THOUSAND/ΜL (ref 0.17–1.22)
MONOCYTES NFR BLD AUTO: 8 % (ref 4–12)
NEUTROPHILS # BLD AUTO: 3.52 THOUSANDS/ΜL (ref 1.85–7.62)
NEUTS SEG NFR BLD AUTO: 55 % (ref 43–75)
NONHDLC SERPL-MCNC: 68 MG/DL
NRBC BLD AUTO-RTO: 0 /100 WBCS
PLATELET # BLD AUTO: 156 THOUSANDS/UL (ref 149–390)
PMV BLD AUTO: 10 FL (ref 8.9–12.7)
POTASSIUM SERPL-SCNC: 4 MMOL/L (ref 3.5–5.3)
PROT SERPL-MCNC: 6.8 G/DL (ref 6.4–8.2)
RBC # BLD AUTO: 5.4 MILLION/UL (ref 3.88–5.62)
SODIUM SERPL-SCNC: 141 MMOL/L (ref 136–145)
TRIGL SERPL-MCNC: 115 MG/DL
WBC # BLD AUTO: 6.37 THOUSAND/UL (ref 4.31–10.16)

## 2020-11-12 PROCEDURE — 80053 COMPREHEN METABOLIC PANEL: CPT

## 2020-11-12 PROCEDURE — 36415 COLL VENOUS BLD VENIPUNCTURE: CPT

## 2020-11-12 PROCEDURE — 83721 ASSAY OF BLOOD LIPOPROTEIN: CPT

## 2020-11-12 PROCEDURE — 80061 LIPID PANEL: CPT

## 2020-11-12 PROCEDURE — 85025 COMPLETE CBC W/AUTO DIFF WBC: CPT

## 2021-02-11 ENCOUNTER — OFFICE VISIT (OUTPATIENT)
Dept: FAMILY MEDICINE CLINIC | Facility: CLINIC | Age: 66
End: 2021-02-11
Payer: COMMERCIAL

## 2021-02-11 VITALS
HEART RATE: 82 BPM | RESPIRATION RATE: 16 BRPM | BODY MASS INDEX: 27.55 KG/M2 | WEIGHT: 186 LBS | HEIGHT: 69 IN | TEMPERATURE: 96 F | SYSTOLIC BLOOD PRESSURE: 130 MMHG | DIASTOLIC BLOOD PRESSURE: 72 MMHG

## 2021-02-11 DIAGNOSIS — Z00.00 MEDICARE ANNUAL WELLNESS VISIT, INITIAL: Primary | ICD-10-CM

## 2021-02-11 DIAGNOSIS — Z12.5 PROSTATE CANCER SCREENING: ICD-10-CM

## 2021-02-11 PROCEDURE — 1170F FXNL STATUS ASSESSED: CPT | Performed by: FAMILY MEDICINE

## 2021-02-11 PROCEDURE — 3008F BODY MASS INDEX DOCD: CPT | Performed by: FAMILY MEDICINE

## 2021-02-11 PROCEDURE — 1036F TOBACCO NON-USER: CPT | Performed by: FAMILY MEDICINE

## 2021-02-11 PROCEDURE — 1160F RVW MEDS BY RX/DR IN RCRD: CPT | Performed by: FAMILY MEDICINE

## 2021-02-11 PROCEDURE — 1125F AMNT PAIN NOTED PAIN PRSNT: CPT | Performed by: FAMILY MEDICINE

## 2021-02-11 PROCEDURE — 3288F FALL RISK ASSESSMENT DOCD: CPT | Performed by: FAMILY MEDICINE

## 2021-02-11 PROCEDURE — 3725F SCREEN DEPRESSION PERFORMED: CPT | Performed by: FAMILY MEDICINE

## 2021-02-11 PROCEDURE — G0438 PPPS, INITIAL VISIT: HCPCS | Performed by: FAMILY MEDICINE

## 2021-02-11 RX ORDER — METOPROLOL SUCCINATE 50 MG/1
50 TABLET, EXTENDED RELEASE ORAL DAILY
Qty: 90 TABLET | Refills: 0
Start: 2021-02-11

## 2021-02-11 RX ORDER — LISINOPRIL 5 MG/1
5 TABLET ORAL DAILY
COMMUNITY
Start: 2021-01-14

## 2021-02-11 NOTE — PROGRESS NOTES
Assessment/Plan:    No problem-specific Assessment & Plan notes found for this encounter  Cad/htn/chol stable, f/u cardiology  psa yearly, HJ normal today     Diagnoses and all orders for this visit:    Medicare annual wellness visit, initial    Prostate cancer screening  -     metoprolol succinate (TOPROL-XL) 50 mg 24 hr tablet; Take 1 tablet (50 mg total) by mouth daily  -     PSA, Total Screen; Future    Other orders  -     lisinopril (ZESTRIL) 5 mg tablet; Take 5 mg by mouth daily        No follow-ups on file  Subjective:      Patient ID: Justin Torres is a 76 y o  male  Chief Complaint   Patient presents with   [facility] Wellness Visit     ac/cma       HPI  cabg x 3 2009  Cardio yearly, stress test q2y    2nd covid shot pending 2/22/21    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[facility] [place]  Had flu shot this season    Labs done by cardiologist yearly as reviewed    The following portions of the patient's history were reviewed and updated as appropriate: allergies, current medications, past family history, past medical history, past social history, past surgical history and problem list     Review of Systems   Respiratory: Negative for shortness of breath  Cardiovascular: Negative for chest pain  Current Outpatient Medications   Medication Sig Dispense Refill    aspirin (ECOTRIN LOW STRENGTH) 81 mg EC tablet Take 81 mg by mouth daily      ezetimibe (ZETIA) 10 mg tablet Take 10 mg by mouth daily  0    lisinopril (ZESTRIL) 5 mg tablet Take 5 mg by mouth daily      metoprolol succinate (TOPROL-XL) 50 mg 24 hr tablet Take 1 tablet (50 mg total) by mouth daily 90 tablet 0    nitroglycerin (NITROSTAT) 0 4 mg SL tablet   0    rosuvastatin (CRESTOR) 40 MG tablet Take 40 mg by mouth daily  0     No current facility-administered medications for this visit          Objective:    /72   Pulse 82   Temp (!) 96 °F (35 6 °C)   Resp 16   Ht 5' 9" (1 753 m)   Wt 84 4 kg (186 lb)   BMI 27 47 kg/m²        Physical Exam  Vitals signs and nursing note reviewed  Constitutional:       Appearance: He is well-developed  He is not ill-appearing  HENT:      Head: Normocephalic  Right Ear: Tympanic membrane normal       Left Ear: Tympanic membrane normal    Eyes:      General: No scleral icterus  Conjunctiva/sclera: Conjunctivae normal    Neck:      Musculoskeletal: Neck supple  Vascular: No carotid bruit  Cardiovascular:      Rate and Rhythm: Normal rate and regular rhythm  Heart sounds: No murmur  Pulmonary:      Effort: Pulmonary effort is normal  No respiratory distress  Breath sounds: No wheezing  Abdominal:      Palpations: Abdomen is soft  Tenderness: There is no abdominal tenderness  There is no guarding  Hernia: No hernia is present  Genitourinary:     Penis: Normal        Scrotum/Testes: Normal       Prostate: Normal       Rectum: Normal    Musculoskeletal:         General: No deformity  Skin:     General: Skin is warm and dry  Coloration: Skin is not jaundiced or pale  Findings: No rash  Neurological:      Mental Status: He is alert  Psychiatric:         Mood and Affect: Mood normal          Behavior: Behavior normal          Thought Content: Thought content normal          BMI Counseling: Body mass index is 27 47 kg/m²  The BMI is above normal  Nutrition recommendations include decreasing portion sizes and moderation in carbohydrate intake  Exercise recommendations include exercising 3-5 times per week  No pharmacotherapy was ordered                Lily Jaramillo DO

## 2021-02-11 NOTE — PATIENT INSTRUCTIONS
SHINGRIX is the new, more effective vaccine for Shingles  It is more than 90% effective  You should check with your local pharmacy and insurance company for availability and coverage  It is a 2 shot series, with the second shot given between 2-6 months after the first, and is approved for ages 48 and up  In the future, it would recommended to also get your second and last pneumonia shot (pneumovax or PPSV23) and a tetanus shot ("adacel") if covered  Medicare Preventive Visit Patient Instructions  Thank you for completing your Welcome to Medicare Visit or Medicare Annual Wellness Visit today  Your next wellness visit will be due in one year (2/11/2022)  The screening/preventive services that you may require over the next 5-10 years are detailed below  Some tests may not apply to you based off risk factors and/or age  Screening tests ordered at today's visit but not completed yet may show as past due  Also, please note that scanned in results may not display below  Preventive Screenings:  Service Recommendations Previous Testing/Comments   Colorectal Cancer Screening  · Colonoscopy    · Fecal Occult Blood Test (FOBT)/Fecal Immunochemical Test (FIT)  · Fecal DNA/Cologuard Test  · Flexible Sigmoidoscopy Age: 54-65 years old   Colonoscopy: every 10 years (May be performed more frequently if at higher risk)  OR  FOBT/FIT: every 1 year  OR  Cologuard: every 3 years  OR  Sigmoidoscopy: every 5 years  Screening may be recommended earlier than age 48 if at higher risk for colorectal cancer  Also, an individualized decision between you and your healthcare provider will decide whether screening between the ages of 74-80 would be appropriate   Colonoscopy: 05/20/2015  FOBT/FIT: Not on file  Cologuard: Not on file  Sigmoidoscopy: Not on file    Screening Current     Prostate Cancer Screening Individualized decision between patient and health care provider in men between ages of 53-78   Medicare will cover every 12 months beginning on the day after your 50th birthday PSA: 0 7 ng/mL     Risks and Benefits Discussed     Hepatitis C Screening Once for adults born between 1945 and 1965  More frequently in patients at high risk for Hepatitis C Hep C Antibody: Not on file    Patient Declines   Diabetes Screening 1-2 times per year if you're at risk for diabetes or have pre-diabetes Fasting glucose: 102 mg/dL   A1C: No results in last 5 years    Screening Current   Cholesterol Screening Once every 5 years if you don't have a lipid disorder  May order more often based on risk factors  Lipid panel: 11/12/2020    Screening Not Indicated  History Lipid Disorder      Other Preventive Screenings Covered by Medicare:  1  Abdominal Aortic Aneurysm (AAA) Screening: covered once if your at risk  You're considered to be at risk if you have a family history of AAA or a male between the age of 73-68 who smoking at least 100 cigarettes in your lifetime  2  Lung Cancer Screening: covers low dose CT scan once per year if you meet all of the following conditions: (1) Age 50-69; (2) No signs or symptoms of lung cancer; (3) Current smoker or have quit smoking within the last 15 years; (4) You have a tobacco smoking history of at least 30 pack years (packs per day x number of years you smoked); (5) You get a written order from a healthcare provider  3  Glaucoma Screening: covered annually if you're considered high risk: (1) You have diabetes OR (2) Family history of glaucoma OR (3)  aged 48 and older OR (3)  American aged 72 and older  3  Osteoporosis Screening: covered every 2 years if you meet one of the following conditions: (1) Have a vertebral abnormality; (2) On glucocorticoid therapy for more than 3 months; (3) Have primary hyperparathyroidism; (4) On osteoporosis medications and need to assess response to drug therapy  5  HIV Screening: covered annually if you're between the age of 12-76   Also covered annually if you are younger than 13 and older than 72 with risk factors for HIV infection  For pregnant patients, it is covered up to 3 times per pregnancy  Immunizations:  Immunization Recommendations   Influenza Vaccine Annual influenza vaccination during flu season is recommended for all persons aged >= 6 months who do not have contraindications   Pneumococcal Vaccine (Prevnar and Pneumovax)  * Prevnar = PCV13  * Pneumovax = PPSV23 Adults 25-60 years old: 1-3 doses may be recommended based on certain risk factors  Adults 72 years old: Prevnar (PCV13) vaccine recommended followed by Pneumovax (PPSV23) vaccine  If already received PPSV23 since turning 65, then PCV13 recommended at least one year after PPSV23 dose  Hepatitis B Vaccine 3 dose series if at intermediate or high risk (ex: diabetes, end stage renal disease, liver disease)   Tetanus (Td) Vaccine - COST NOT COVERED BY MEDICARE PART B Following completion of primary series, a booster dose should be given every 10 years to maintain immunity against tetanus  Td may also be given as tetanus wound prophylaxis  Tdap Vaccine - COST NOT COVERED BY MEDICARE PART B Recommended at least once for all adults  For pregnant patients, recommended with each pregnancy  Shingles Vaccine (Shingrix) - COST NOT COVERED BY MEDICARE PART B  2 shot series recommended in those aged 48 and above     Health Maintenance Due:      Topic Date Due    Hepatitis C Screening  1955    Colonoscopy Surveillance  05/20/2022    Colorectal Cancer Screening  05/20/2025     Immunizations Due:      Topic Date Due    Pneumococcal Vaccine: 65+ Years (2 of 2 - PPSV23) 12/17/2020     Advance Directives   What are advance directives? Advance directives are legal documents that state your wishes and plans for medical care  These plans are made ahead of time in case you lose your ability to make decisions for yourself   Advance directives can apply to any medical decision, such as the treatments you want, and if you want to donate organs  What are the types of advance directives? There are many types of advance directives, and each state has rules about how to use them  You may choose a combination of any of the following:  · Living will: This is a written record of the treatment you want  You can also choose which treatments you do not want, which to limit, and which to stop at a certain time  This includes surgery, medicine, IV fluid, and tube feedings  · Durable power of  for healthcare [facility]): This is a written record that states who you want to make healthcare choices for you when you are unable to make them for yourself  This person, called a proxy, is usually a family member or a friend  You may choose more than 1 proxy  · Do not resuscitate (DNR) order:  A DNR order is used in case your heart stops beating or you stop breathing  It is a request not to have certain forms of treatment, such as CPR  A DNR order may be included in other types of advance directives  · Medical directive: This covers the care that you want if you are in a coma, near death, or unable to make decisions for yourself  You can list the treatments you want for each condition  Treatment may include pain medicine, surgery, blood transfusions, dialysis, IV or tube feedings, and a ventilator (breathing machine)  · Values history: This document has questions about your views, beliefs, and how you feel and think about life  This information can help others choose the care that you would choose  Why are advance directives important? An advance directive helps you control your care  Although spoken wishes may be used, it is better to have your wishes written down  Spoken wishes can be misunderstood, or not followed  Treatments may be given even if you do not want them  An advance directive may make it easier for your family to make difficult choices about your care     Weight Management   Why it is important to manage your weight:  Being overweight increases your risk of health conditions such as heart disease, high blood pressure, type 2 diabetes, and certain types of cancer  It can also increase your risk for osteoarthritis, sleep apnea, and other respiratory problems  Aim for a slow, steady weight loss  Even a small amount of weight loss can lower your risk of health problems  How to lose weight safely:  A safe and healthy way to lose weight is to eat fewer calories and get regular exercise  You can lose up about 1 pound a week by decreasing the number of calories you eat by 500 calories each day  Healthy meal plan for weight management:  A healthy meal plan includes a variety of foods, contains fewer calories, and helps you stay healthy  A healthy meal plan includes the following:  · Eat whole-grain foods more often  A healthy meal plan should contain fiber  Fiber is the part of grains, fruits, and vegetables that is not broken down by your body  Whole-grain foods are healthy and provide extra fiber in your diet  Some examples of whole-grain foods are whole-wheat breads and pastas, oatmeal, brown rice, and bulgur  · Eat a variety of vegetables every day  Include dark, leafy greens such as spinach, kale, federico greens, and mustard greens  Eat yellow and orange vegetables such as carrots, sweet potatoes, and winter squash  · Eat a variety of fruits every day  Choose fresh or canned fruit (canned in its own juice or light syrup) instead of juice  Fruit juice has very little or no fiber  · Eat low-fat dairy foods  Drink fat-free (skim) milk or 1% milk  Eat fat-free yogurt and low-fat cottage cheese  Try low-fat cheeses such as mozzarella and other reduced-fat cheeses  · Choose meat and other protein foods that are low in fat  Choose beans or other legumes such as split peas or lentils  Choose fish, skinless poultry (chicken or turkey), or lean cuts of red meat (beef or pork)   Before you cook meat or poultry, cut off any visible fat  · Use less fat and oil  Try baking foods instead of frying them  Add less fat, such as margarine, sour cream, regular salad dressing and mayonnaise to foods  Eat fewer high-fat foods  Some examples of high-fat foods include french fries, doughnuts, ice cream, and cakes  · Eat fewer sweets  Limit foods and drinks that are high in sugar  This includes candy, cookies, regular soda, and sweetened drinks  Exercise:  Exercise at least 30 minutes per day on most days of the week  Some examples of exercise include walking, biking, dancing, and swimming  You can also fit in more physical activity by taking the stairs instead of the elevator or parking farther away from stores  Ask your healthcare provider about the best exercise plan for you  © Copyright [employer] 2018 Information is for End User's use only and may not be sold, redistributed or otherwise used for commercial purposes   All illustrations and images included in CareNotes® are the copyrighted property of [employer]  or [address] [place] [address]

## 2021-02-12 NOTE — PROGRESS NOTES
Assessment and Plan:     Problem List Items Addressed This Visit        Active Problems    Prostate cancer screening    Relevant Medications    metoprolol succinate (TOPROL-XL) 50 mg 24 hr tablet    Other Relevant Orders    PSA, Total Screen    Medicare annual wellness visit, initial - Primary           Preventive health issues were discussed with patient, and age appropriate screening tests were ordered as noted in patient's After Visit Summary  Personalized health advice and appropriate referrals for health education or preventive services given if needed, as noted in patient's After Visit Summary       History of Present Illness:     Patient presents for Medicare Annual Wellness visit    Patient Care Team:  DO maegan Aiken PCP - General (Family Medicine)     Problem List:     Patient Active Problem List   Diagnosis    Coronary artery disease involving native coronary artery    H/O heart artery stent    Prediabetes    Hx of CABG    Hyperlipidemia    Prostate cancer screening    Left inguinal hernia    Immunization due    Medicare annual wellness visit, initial      Past Medical and Surgical History:     Past Medical History:   Diagnosis Date    Heart problem      Past Surgical History:   Procedure Laterality Date    ARTERIAL BYPASS SURGERY        Family History:     Family History   Problem Relation Age of Onset    Stroke Father     Rectal cancer Father     Alzheimer's disease Mother       Social History:        Social History     Socioeconomic History    Marital status: Single     Spouse name: None    Number of children: None    Years of education: None    Highest education level: None   Occupational History    None   Social Needs    Financial resource strain: None    Food insecurity     Worry: None     Inability: None    Transportation needs     Medical: None     Non-medical: None   Tobacco Use    Smoking status: Never Smoker    Smokeless tobacco: Never Used   Substance and Sexual Activity    Alcohol use: None    Drug use: None    Sexual activity: None   Lifestyle    Physical activity     Days per week: None     Minutes per session: None    Stress: None   Relationships    Social connections     Talks on phone: None     Gets together: None     Attends Holiness service: None     Active member of club or organization: None     Attends meetings of clubs or organizations: None     Relationship status: None    Intimate partner violence     Fear of current or ex partner: None     Emotionally abused: None     Physically abused: None     Forced sexual activity: None   Other Topics Concern    None   Social History Narrative    None      Medications and Allergies:     Current Outpatient Medications   Medication Sig Dispense Refill    aspirin (ECOTRIN LOW STRENGTH) 81 mg EC tablet Take 81 mg by mouth daily      ezetimibe (ZETIA) 10 mg tablet Take 10 mg by mouth daily  0    lisinopril (ZESTRIL) 5 mg tablet Take 5 mg by mouth daily      metoprolol succinate (TOPROL-XL) 50 mg 24 hr tablet Take 1 tablet (50 mg total) by mouth daily 90 tablet 0    nitroglycerin (NITROSTAT) 0 4 mg SL tablet   0    rosuvastatin (CRESTOR) 40 MG tablet Take 40 mg by mouth daily  0     No current facility-administered medications for this visit        No Known Allergies   Immunizations:     Immunization History   Administered Date(s) Administered    Influenza, injectable, quadrivalent, preservative free 0 5 mL 10/25/2018    Pneumococcal Conjugate 13-Valent 12/17/2019    influenza, trivalent, adjuvanted 10/30/2019      Health Maintenance:         Topic Date Due    Hepatitis C Screening  1955    Colonoscopy Surveillance  05/20/2022    Colorectal Cancer Screening  05/20/2025         Topic Date Due    Pneumococcal Vaccine: 65+ Years (2 of 2 - PPSV23) 12/17/2020      Medicare Health Risk Assessment:     /72   Pulse 82   Temp (!) 96 °F (35 6 °C)   Resp 16   Ht 5' 9" (1 753 m)   Wt 84 4 kg (186 lb) BMI 27 47 kg/m²      Alexis Arshad is here for his Initial Wellness visit  Last Medicare Wellness visit information reviewed, patient interviewed and updates made to the record today  Health Risk Assessment:   Patient rates overall health as very good  Patient feels that their physical health rating is slightly better  Eyesight was rated as same  Hearing was rated as same  Patient feels that their emotional and mental health rating is same  Pain experienced in the last 7 days has been none  Patient states that he has experienced no weight loss or gain in last 6 months  Depression Screening:   PHQ-2 Score: 0      Fall Risk Screening: In the past year, patient has experienced: no history of falling in past year      Home Safety:  Patient does not have trouble with stairs inside or outside of their home  Patient has working smoke alarms and has working carbon monoxide detector  Home safety hazards include: none  Nutrition:   Current diet is Low Saturated Fat and Low Cholesterol  Medications:   Patient is currently taking over-the-counter supplements  OTC medications include: see medication list  Patient is able to manage medications  Activities of Daily Living (ADLs)/Instrumental Activities of Daily Living (IADLs):   Walk and transfer into and out of bed and chair?: Yes  Dress and groom yourself?: Yes    Bathe or shower yourself?: Yes    Feed yourself? Yes  Do your laundry/housekeeping?: Yes  Manage your money, pay your bills and track your expenses?: Yes  Make your own meals?: Yes    Do your own shopping?: Yes    Previous Hospitalizations:   Any hospitalizations or ED visits within the last 12 months?: No      Advance Care Planning:   Living will: Yes    Durable POA for healthcare:  Yes    Advanced directive: Yes    End of Life Decisions reviewed with patient: No      Cognitive Screening:   Provider or family/friend/caregiver concerned regarding cognition?: No    PREVENTIVE SCREENINGS Cardiovascular Screening:    General: Screening Not Indicated and History Lipid Disorder      Diabetes Screening:     General: Screening Current      Colorectal Cancer Screening:     General: Screening Current      Prostate Cancer Screening:    General: Risks and Benefits Discussed      Osteoporosis Screening:    General: Screening Not Indicated      Abdominal Aortic Aneurysm (AAA) Screening:    Risk factors include: age between 73-67 yo        Lung Cancer Screening:     General: Screening Not Indicated      Hepatitis C Screening:    General: Patient Declines      Preventive Screening Comments: Add psa to next scheduled yearly labs by cardio though he is aware it will be after about 2 years, psa velocity monitoring aware    Other Counseling Topics:   Alcohol use counseling, car/seat belt/driving safety and regular weightbearing exercise         Christian Lyon DO

## 2021-11-17 ENCOUNTER — APPOINTMENT (OUTPATIENT)
Dept: LAB | Facility: CLINIC | Age: 66
End: 2021-11-17
Payer: COMMERCIAL

## 2021-11-22 ENCOUNTER — APPOINTMENT (OUTPATIENT)
Dept: LAB | Facility: CLINIC | Age: 66
End: 2021-11-22
Payer: COMMERCIAL

## 2021-11-23 ENCOUNTER — TELEPHONE (OUTPATIENT)
Dept: HEMATOLOGY ONCOLOGY | Facility: CLINIC | Age: 66
End: 2021-11-23

## 2021-12-01 ENCOUNTER — CONSULT (OUTPATIENT)
Dept: HEMATOLOGY ONCOLOGY | Facility: MEDICAL CENTER | Age: 66
End: 2021-12-01
Payer: COMMERCIAL

## 2021-12-01 VITALS
TEMPERATURE: 97.9 F | OXYGEN SATURATION: 97 % | HEART RATE: 80 BPM | DIASTOLIC BLOOD PRESSURE: 78 MMHG | WEIGHT: 190 LBS | RESPIRATION RATE: 16 BRPM | HEIGHT: 69 IN | SYSTOLIC BLOOD PRESSURE: 130 MMHG | BODY MASS INDEX: 28.14 KG/M2

## 2021-12-01 DIAGNOSIS — Z95.1 HX OF CABG: Primary | ICD-10-CM

## 2021-12-01 DIAGNOSIS — R79.89 ABNORMAL CBC: ICD-10-CM

## 2021-12-01 DIAGNOSIS — I25.10 CORONARY ARTERY DISEASE INVOLVING NATIVE CORONARY ARTERY OF NATIVE HEART WITHOUT ANGINA PECTORIS: ICD-10-CM

## 2021-12-01 PROCEDURE — 99204 OFFICE O/P NEW MOD 45 MIN: CPT | Performed by: INTERNAL MEDICINE

## 2021-12-01 PROCEDURE — 3008F BODY MASS INDEX DOCD: CPT | Performed by: INTERNAL MEDICINE

## 2021-12-01 PROCEDURE — 1036F TOBACCO NON-USER: CPT | Performed by: INTERNAL MEDICINE

## 2022-01-10 ENCOUNTER — APPOINTMENT (OUTPATIENT)
Dept: LAB | Facility: CLINIC | Age: 67
End: 2022-01-10
Payer: COMMERCIAL

## 2022-01-10 DIAGNOSIS — R79.89 ABNORMAL CBC: ICD-10-CM

## 2022-01-10 LAB
BASOPHILS # BLD AUTO: 0.06 THOUSANDS/ΜL (ref 0–0.1)
BASOPHILS NFR BLD AUTO: 1 % (ref 0–1)
EOSINOPHIL # BLD AUTO: 0.18 THOUSAND/ΜL (ref 0–0.61)
EOSINOPHIL NFR BLD AUTO: 3 % (ref 0–6)
ERYTHROCYTE [DISTWIDTH] IN BLOOD BY AUTOMATED COUNT: 13.2 % (ref 11.6–15.1)
HCT VFR BLD AUTO: 48.3 % (ref 36.5–49.3)
HGB BLD-MCNC: 17.5 G/DL (ref 12–17)
IMM GRANULOCYTES # BLD AUTO: 0.03 THOUSAND/UL (ref 0–0.2)
IMM GRANULOCYTES NFR BLD AUTO: 1 % (ref 0–2)
LYMPHOCYTES # BLD AUTO: 2.06 THOUSANDS/ΜL (ref 0.6–4.47)
LYMPHOCYTES NFR BLD AUTO: 33 % (ref 14–44)
MCH RBC QN AUTO: 29.6 PG (ref 26.8–34.3)
MCHC RBC AUTO-ENTMCNC: 36.2 G/DL (ref 31.4–37.4)
MCV RBC AUTO: 82 FL (ref 82–98)
MONOCYTES # BLD AUTO: 0.54 THOUSAND/ΜL (ref 0.17–1.22)
MONOCYTES NFR BLD AUTO: 9 % (ref 4–12)
NEUTROPHILS # BLD AUTO: 3.31 THOUSANDS/ΜL (ref 1.85–7.62)
NEUTS SEG NFR BLD AUTO: 53 % (ref 43–75)
NRBC BLD AUTO-RTO: 0 /100 WBCS
PLATELET # BLD AUTO: 193 THOUSANDS/UL (ref 149–390)
PMV BLD AUTO: 10.3 FL (ref 8.9–12.7)
RBC # BLD AUTO: 5.91 MILLION/UL (ref 3.88–5.62)
WBC # BLD AUTO: 6.18 THOUSAND/UL (ref 4.31–10.16)

## 2022-01-10 PROCEDURE — 85025 COMPLETE CBC W/AUTO DIFF WBC: CPT

## 2022-01-10 PROCEDURE — 36415 COLL VENOUS BLD VENIPUNCTURE: CPT

## 2022-01-14 ENCOUNTER — OFFICE VISIT (OUTPATIENT)
Dept: HEMATOLOGY ONCOLOGY | Facility: MEDICAL CENTER | Age: 67
End: 2022-01-14
Payer: COMMERCIAL

## 2022-01-14 VITALS
DIASTOLIC BLOOD PRESSURE: 80 MMHG | SYSTOLIC BLOOD PRESSURE: 132 MMHG | TEMPERATURE: 96.2 F | HEIGHT: 69 IN | RESPIRATION RATE: 16 BRPM | OXYGEN SATURATION: 98 % | BODY MASS INDEX: 29.03 KG/M2 | HEART RATE: 79 BPM | WEIGHT: 196 LBS

## 2022-01-14 DIAGNOSIS — R79.89 ABNORMAL CBC: Primary | ICD-10-CM

## 2022-01-14 PROCEDURE — 99213 OFFICE O/P EST LOW 20 MIN: CPT | Performed by: INTERNAL MEDICINE

## 2022-01-14 PROCEDURE — 3008F BODY MASS INDEX DOCD: CPT | Performed by: INTERNAL MEDICINE

## 2022-01-14 PROCEDURE — 1160F RVW MEDS BY RX/DR IN RCRD: CPT | Performed by: INTERNAL MEDICINE

## 2022-01-14 PROCEDURE — 1036F TOBACCO NON-USER: CPT | Performed by: INTERNAL MEDICINE

## 2022-01-14 NOTE — PROGRESS NOTES
Marlys Munoz  1955  [facility] [address] [address]  HEMATOLOGY/ONCOLOGY CONSULTATION REPORT    DISCUSSION/SUMMARY:    18-year-old male with relatively good medical history, stable cardiac issues recently found to have an elevated hemoglobin level, elevated hematocrit level and slightly decreased platelet count  White count and differential were normal   The previously the plan had been surveillance  Mr Jenise Keen continues to feel well and clinically there are no concerning signs  CBC was recently repeated and the parameters are trended below  The white count and differential continue to be within normal limits  The platelet count has returned to within normal limits  Hemoglobin level is still elevated but lower than before, hematocrit level is actually within the normal range  The precipitating events from early November 2021 that may have caused the CBC abnormalities is not clear but the numbers are getting better  As before, no additional hematology workup is needed other than surveillance  Mr Jenise Keen agrees to return in 6 months with a CBC/differential before  If the parameters have completely normalized by that time, patient can continue routine follow-ups with his PCP only  As before, patient knows there is always the possibility of an early brewing primary bone marrow disorder  Mr Jenise Keen knows to call if he has any other hematology questions or concerns  Carefully review your medication list and verify that the list is accurate and up-to-date  Please call the hematology/oncology office if there are medications missing from the list, medications on the list that you are not currently taking or if there is a dosage or instruction that is different from how you're taking that medication      Patient goals and areas of care:  Monitor CBC parameters  Barriers to care: none  Patient is able to self-care   ______________________________________________________________________________________    Chief Complaint   Patient presents with    Follow-up     CBC abnormalities     History of Present Illness:  59-year-old male referred for evaluation of CBC abnormalities  Mr Susan Torrez suffered an MI in 1999  Stent was placed  Patient has been followed by Cardiology  Patient underwent CABG in 2009  No recent cardiac issues but recent routine blood work demonstrated an elevated hemoglobin level, elevated hematocrit level and slightly decreased platelet level  The plan has been surveillance  Elizabeth Saint returns for follow-up  Patient continues to feel well  No fevers or signs of infection  No problems with excessive bruising or bleeding  Appetite is good, weight is stable  Activities are baseline  Routine health maintenance and medical care is up-to-date  He has completed his COVID vaccination  Review of Systems   Constitutional: Negative  HENT: Negative  Eyes: Negative  Respiratory: Negative  Cardiovascular: Negative  Gastrointestinal: Negative  Endocrine: Negative  Genitourinary: Negative  Musculoskeletal: Negative  Skin: Negative  Allergic/Immunologic: Negative  Neurological: Negative  Hematological: Negative  Psychiatric/Behavioral: Negative  All other systems reviewed and are negative      Patient Active Problem List   Diagnosis    Coronary artery disease involving native coronary artery    H/O heart artery stent    Prediabetes    Hx of CABG    Hyperlipidemia    Prostate cancer screening    Left inguinal hernia    Immunization due    Medicare annual wellness visit, initial    Abnormal CBC     Past Medical History:   Diagnosis Date    Heart problem      Past Surgical History:   Procedure Laterality Date    ARTERIAL BYPASS SURGERY     Past surgical history:  No prior blood transfusions    Family History   Problem Relation Age of Onset    Stroke Father     Rectal cancer Father     Alzheimer's disease Mother    Family history:  No known familial or genetic diseases    Social History     Socioeconomic History    Marital status: Single     Spouse name: Not on file    Number of children: Not on file    Years of education: Not on file    Highest education level: Not on file   Occupational History    Not on file   Tobacco Use    Smoking status: Never Smoker    Smokeless tobacco: Never Used   Substance and Sexual Activity    Alcohol use: Not on file    Drug use: Not on file    Sexual activity: Not on file   Other Topics Concern    Not on file   Social History Narrative    Not on file     Social Determinants of Health     Financial Resource Strain: Not on file   Food Insecurity: Not on file   Transportation Needs: Not on file   Physical Activity: Not on file   Stress: Not on file   Social Connections: Not on file   Intimate Partner Violence: Not on file   Housing Stability: Not on file   Social history:  No tobacco, alcohol or drug abuse, patient worked as a  for many years - only toxic exposure was secondhand cigarette use in the 76s and 80s    Current Outpatient Medications:     aspirin (ECOTRIN LOW STRENGTH) 81 mg EC tablet, Take 81 mg by mouth daily, Disp: , Rfl:     ezetimibe (ZETIA) 10 mg tablet, Take 10 mg by mouth daily, Disp: , Rfl: 0    lisinopril (ZESTRIL) 5 mg tablet, Take 5 mg by mouth daily, Disp: , Rfl:     metoprolol succinate (TOPROL-XL) 50 mg 24 hr tablet, Take 1 tablet (50 mg total) by mouth daily, Disp: 90 tablet, Rfl: 0    nitroglycerin (NITROSTAT) 0 4 mg SL tablet, , Disp: , Rfl: 0    rosuvastatin (CRESTOR) 40 MG tablet, Take 40 mg by mouth daily, Disp: , Rfl: 0    No Known Allergies    Vitals:    01/14/22 0917   BP: 132/80   Pulse: 79   Resp: 16   Temp: (!) 96 2 °F (35 7 °C)   SpO2: 98%     Physical Exam  Constitutional:       Appearance: He is well-developed     HENT:      Head: Normocephalic and atraumatic  Right Ear: External ear normal       Left Ear: External ear normal    Eyes:      Conjunctiva/sclera: Conjunctivae normal       Pupils: Pupils are equal, round, and reactive to light  Cardiovascular:      Rate and Rhythm: Normal rate and regular rhythm  Heart sounds: Normal heart sounds  Pulmonary:      Effort: Pulmonary effort is normal       Breath sounds: Normal breath sounds  Comments: Clear bilaterally  Abdominal:      General: Bowel sounds are normal       Palpations: Abdomen is soft  Musculoskeletal:         General: Normal range of motion  Cervical back: Normal range of motion and neck supple  Skin:     General: Skin is warm  Comments: Good color, warm, moist, no petechiae or ecchymoses   Neurological:      Mental Status: He is alert and oriented to person, place, and time  Deep Tendon Reflexes: Reflexes are normal and symmetric  Psychiatric:         Behavior: Behavior normal          Thought Content:  Thought content normal          Judgment: Judgment normal      Extremities:  No lower extremity edema, no cords, pulses are 1+   Lymphatics:  No adenopathy in the neck, supraclavicular region, axilla bilaterally    Labs          11/22/2021 BUN = 21 creatinine = 1 01 calcium = 8 9 LFTs WNL total protein = 7 2    Imaging    No recent imaging for review    Pathology    No recent pathology results for review

## 2022-02-17 ENCOUNTER — OFFICE VISIT (OUTPATIENT)
Dept: FAMILY MEDICINE CLINIC | Facility: CLINIC | Age: 67
End: 2022-02-17
Payer: COMMERCIAL

## 2022-02-17 VITALS
OXYGEN SATURATION: 97 % | RESPIRATION RATE: 16 BRPM | HEIGHT: 69 IN | HEART RATE: 82 BPM | BODY MASS INDEX: 29.18 KG/M2 | DIASTOLIC BLOOD PRESSURE: 72 MMHG | WEIGHT: 197 LBS | TEMPERATURE: 97.3 F | SYSTOLIC BLOOD PRESSURE: 130 MMHG

## 2022-02-17 DIAGNOSIS — R73.03 PREDIABETES: ICD-10-CM

## 2022-02-17 DIAGNOSIS — Z00.00 MEDICARE ANNUAL WELLNESS VISIT, SUBSEQUENT: Primary | ICD-10-CM

## 2022-02-17 DIAGNOSIS — Z12.5 PROSTATE CANCER SCREENING: ICD-10-CM

## 2022-02-17 DIAGNOSIS — E78.5 HYPERLIPIDEMIA, UNSPECIFIED HYPERLIPIDEMIA TYPE: ICD-10-CM

## 2022-02-17 DIAGNOSIS — Z23 IMMUNIZATION DUE: ICD-10-CM

## 2022-02-17 DIAGNOSIS — D58.2 ELEVATED HEMOGLOBIN (HCC): ICD-10-CM

## 2022-02-17 DIAGNOSIS — Z95.1 HX OF CABG: ICD-10-CM

## 2022-02-17 DIAGNOSIS — R89.9 ABNORMAL LABORATORY TEST: ICD-10-CM

## 2022-02-17 DIAGNOSIS — I25.10 CORONARY ARTERY DISEASE INVOLVING NATIVE CORONARY ARTERY OF NATIVE HEART WITHOUT ANGINA PECTORIS: ICD-10-CM

## 2022-02-17 PROCEDURE — 90471 IMMUNIZATION ADMIN: CPT

## 2022-02-17 PROCEDURE — 1036F TOBACCO NON-USER: CPT | Performed by: FAMILY MEDICINE

## 2022-02-17 PROCEDURE — 99214 OFFICE O/P EST MOD 30 MIN: CPT | Performed by: FAMILY MEDICINE

## 2022-02-17 PROCEDURE — 3725F SCREEN DEPRESSION PERFORMED: CPT | Performed by: FAMILY MEDICINE

## 2022-02-17 PROCEDURE — G0439 PPPS, SUBSEQ VISIT: HCPCS | Performed by: FAMILY MEDICINE

## 2022-02-17 PROCEDURE — 4040F PNEUMOC VAC/ADMIN/RCVD: CPT | Performed by: FAMILY MEDICINE

## 2022-02-17 PROCEDURE — 3288F FALL RISK ASSESSMENT DOCD: CPT | Performed by: FAMILY MEDICINE

## 2022-02-17 PROCEDURE — 3008F BODY MASS INDEX DOCD: CPT | Performed by: FAMILY MEDICINE

## 2022-02-17 PROCEDURE — 1125F AMNT PAIN NOTED PAIN PRSNT: CPT | Performed by: FAMILY MEDICINE

## 2022-02-17 PROCEDURE — 1160F RVW MEDS BY RX/DR IN RCRD: CPT | Performed by: FAMILY MEDICINE

## 2022-02-17 PROCEDURE — 1170F FXNL STATUS ASSESSED: CPT | Performed by: FAMILY MEDICINE

## 2022-02-17 PROCEDURE — 90732 PPSV23 VACC 2 YRS+ SUBQ/IM: CPT

## 2022-02-17 NOTE — PROGRESS NOTES
Assessment/Plan:    No problem-specific Assessment & Plan notes found for this encounter  psa yearly suggested, JH normal today    Pos HBV core antibody at blood bank on 9/21/21, no risk factors, f/u labs and possible hepatology referral    Htn/CAD/CABG cardio f/u    Cholesterol stable, LDL 56    Elevated Hgb, being followed by hematology, no lung or breathing issues or symptoms of AGUILAR at this time, feels well     Diagnoses and all orders for this visit:    Medicare annual wellness visit, subsequent    Abnormal laboratory test  -     Hepatitis B Core IgM Reflex; Future  -     Hepatitis B core antibody, total; Future  -     Hepatitis B DNA, ultraquantitative, PCR; Future    Immunization due  -     PNEUMOCOCCAL POLYSACCHARIDE VACCINE 23-VALENT =>3YO SQ IM    Prostate cancer screening  -     PSA, Total Screen; Future    Hx of CABG    Coronary artery disease involving native coronary artery of native heart without angina pectoris    Prediabetes    Hyperlipidemia, unspecified hyperlipidemia type    Elevated hemoglobin (HCC)          Return if symptoms worsen or fail to improve  Subjective:      Patient ID: Virginia Brown is a 78 y o  male  Chief Complaint   Patient presents with   [facility] Wellness Visit       HPI    [facility] daily  [place] core ab Sept 2021  Never before per pt  No prior transfusions  No high risk sexual activity or ivda hx  Last colonoscopy 2015, totally normal per pt, he will try to get us the report, done in [place]    The following portions of the patient's history were reviewed and updated as appropriate: allergies, current medications, past family history, past medical history, past social history, past surgical history and problem list     Review of Systems   Constitutional: Negative for chills and fever           Current Outpatient Medications   Medication Sig Dispense Refill    aspirin (ECOTRIN LOW STRENGTH) 81 mg EC tablet Take 81 mg by mouth daily      ezetimibe (ZETIA) 10 mg tablet Take 10 mg by mouth daily  0    lisinopril (ZESTRIL) 5 mg tablet Take 5 mg by mouth daily      metoprolol succinate (TOPROL-XL) 50 mg 24 hr tablet Take 1 tablet (50 mg total) by mouth daily 90 tablet 0    nitroglycerin (NITROSTAT) 0 4 mg SL tablet   0    rosuvastatin (CRESTOR) 40 MG tablet Take 40 mg by mouth daily  0     No current facility-administered medications for this visit  Objective:    /72   Pulse 82   Temp (!) 97 3 °F (36 3 °C)   Resp 16   Ht 5' 9" (1 753 m)   Wt 89 4 kg (197 lb)   SpO2 97%   BMI 29 09 kg/m²        Physical Exam  Vitals and nursing note reviewed  Constitutional:       General: He is not in acute distress  Appearance: He is well-developed  He is not ill-appearing  HENT:      Head: Normocephalic  Eyes:      General: No scleral icterus  Conjunctiva/sclera: Conjunctivae normal    Cardiovascular:      Rate and Rhythm: Normal rate and regular rhythm  Pulmonary:      Effort: Pulmonary effort is normal  No respiratory distress  Breath sounds: No wheezing or rales  Abdominal:      Palpations: Abdomen is soft  Hernia: No hernia is present  Genitourinary:     Penis: Normal        Testes: Normal       Prostate: Normal       Rectum: Normal    Musculoskeletal:         General: No deformity  Cervical back: Neck supple  Skin:     General: Skin is warm and dry  Coloration: Skin is not jaundiced or pale  Neurological:      Mental Status: He is alert  Motor: No weakness  Gait: Gait normal    Psychiatric:         Mood and Affect: Mood normal          Behavior: Behavior normal          Thought Content:  Thought content normal         Medhat Starr, DO

## 2022-02-18 ENCOUNTER — TELEPHONE (OUTPATIENT)
Dept: FAMILY MEDICINE CLINIC | Facility: CLINIC | Age: 67
End: 2022-02-18

## 2022-02-18 NOTE — TELEPHONE ENCOUNTER
Pt saw Dr Sukhdev Pizano yesterday and brought back Colonoscopy results from another facility for us to have for his chart here and for Dr Sukhdev Pizano to review  In nurse pending one

## 2022-02-18 NOTE — PATIENT INSTRUCTIONS

## 2022-02-18 NOTE — PROGRESS NOTES
Assessment and Plan:     Problem List Items Addressed This Visit        Active Problems    Coronary artery disease involving native coronary artery    Prediabetes    Hyperlipidemia    Prostate cancer screening    Relevant Orders    PSA, Total Screen    Immunization due    Relevant Orders    PNEUMOCOCCAL POLYSACCHARIDE VACCINE 23-VALENT =>1YO SQ IM (Completed)    Hx of CABG    Elevated hemoglobin (HCC)    Abnormal laboratory test    Relevant Orders    Hepatitis B Core IgM Reflex    Hepatitis B core antibody, total    Hepatitis B DNA, ultraquantitative, PCR    Medicare annual wellness visit, subsequent - Primary        BMI Counseling: Body mass index is 29 09 kg/m²  The BMI is above normal  Nutrition recommendations include decreasing portion sizes and moderation in carbohydrate intake  Exercise recommendations include exercising 3-5 times per week  No pharmacotherapy was ordered  Rationale for BMI follow-up plan is due to patient being overweight or obese  Depression Screening and Follow-up Plan: Patient was screened for depression during today's encounter  They screened negative with a PHQ-2 score of 0  Preventive health issues were discussed with patient, and age appropriate screening tests were ordered as noted in patient's After Visit Summary  Personalized health advice and appropriate referrals for health education or preventive services given if needed, as noted in patient's After Visit Summary       History of Present Illness:     Patient presents for Medicare Annual Wellness visit    Patient Care Team:  Loulou Vines DO as PCP - General (Family Medicine)     Problem List:     Patient Active Problem List   Diagnosis    Coronary artery disease involving native coronary artery    H/O heart artery stent    Prediabetes    Hx of CABG    Hyperlipidemia    Prostate cancer screening    Left inguinal hernia    Immunization due    Elevated hemoglobin (HCC)    Abnormal laboratory test   [facility] annual wellness visit, subsequent      Past Medical and Surgical History:     Past Medical History:   Diagnosis Date    Heart problem      Past Surgical History:   Procedure Laterality Date    ARTERIAL BYPASS SURGERY        Family History:     Family History   Problem Relation Age of Onset    Stroke Father     Rectal cancer Father     Alzheimer's disease Mother       Social History:     Social History     Socioeconomic History    Marital status: Single     Spouse name: None    Number of children: None    Years of education: None    Highest education level: None   Occupational History    None   Tobacco Use    Smoking status: Never Smoker    Smokeless tobacco: Never Used   Substance and Sexual Activity    Alcohol use: None    Drug use: None    Sexual activity: None   Other Topics Concern    None   Social History Narrative    None     Social Determinants of Health     Financial Resource Strain: Not on file   Food Insecurity: Not on file   Transportation Needs: Not on file   Physical Activity: Not on file   Stress: Not on file   Social Connections: Not on file   Intimate Partner Violence: Not on file   Housing Stability: Not on file      Medications and Allergies:     Current Outpatient Medications   Medication Sig Dispense Refill    aspirin (ECOTRIN LOW STRENGTH) 81 mg EC tablet Take 81 mg by mouth daily      ezetimibe (ZETIA) 10 mg tablet Take 10 mg by mouth daily  0    lisinopril (ZESTRIL) 5 mg tablet Take 5 mg by mouth daily      metoprolol succinate (TOPROL-XL) 50 mg 24 hr tablet Take 1 tablet (50 mg total) by mouth daily 90 tablet 0    nitroglycerin (NITROSTAT) 0 4 mg SL tablet   0    rosuvastatin (CRESTOR) 40 MG tablet Take 40 mg by mouth daily  0     No current facility-administered medications for this visit       No Known Allergies   Immunizations:     Immunization History   Administered Date(s) Administered    COVID-19 MODERNA VACC 0 5 ML IM 01/14/2021, 02/22/2021, 10/28/2021    INFLUENZA 10/15/2021    Influenza, injectable, quadrivalent, preservative free 0 5 mL 10/25/2018    Pneumococcal Conjugate 13-Valent 12/17/2019    Pneumococcal Polysaccharide PPV23 02/17/2022    influenza, trivalent, adjuvanted 10/30/2019      Health Maintenance:         Topic Date Due    Hepatitis C Screening  Never done    Colorectal Cancer Screening  05/20/2022     There are no preventive care reminders to display for this patient  Medicare Health Risk Assessment:     /72   Pulse 82   Temp (!) 97 3 °F (36 3 °C)   Resp 16   Ht 5' 9" (1 753 m)   Wt 89 4 kg (197 lb)   SpO2 97%   BMI 29 09 kg/m²      Miri Earl is here for his Subsequent Wellness visit  Last Medicare Wellness visit information reviewed, patient interviewed and updates made to the record today  Health Risk Assessment:   Patient rates overall health as very good  Patient feels that their physical health rating is same  Patient is satisfied with their life  Eyesight was rated as same  Hearing was rated as same  Patient feels that their emotional and mental health rating is same  Patients states they are sometimes angry  Patient states they are sometimes unusually tired/fatigued  Pain experienced in the last 7 days has been none  Patient states that he has experienced weight loss or gain in last 6 months  Depression Screening:   PHQ-2 Score: 0      Fall Risk Screening: In the past year, patient has experienced: no history of falling in past year      Home Safety:  Patient does not have trouble with stairs inside or outside of their home  Patient has working smoke alarms and has working carbon monoxide detector  Home safety hazards include: none  Nutrition:   Current diet is No Added Salt, Limited junk food, Regular and Low Saturated Fat  Medications:   Patient is currently taking over-the-counter supplements  OTC medications include: see medication list  Patient is able to manage medications       Activities of Daily Living (ADLs)/Instrumental Activities of Daily Living (IADLs):   Walk and transfer into and out of bed and chair?: Yes  Dress and groom yourself?: Yes    Bathe or shower yourself?: Yes    Feed yourself? Yes  Do your laundry/housekeeping?: Yes  Manage your money, pay your bills and track your expenses?: Yes  Make your own meals?: Yes    Do your own shopping?: Yes    Previous Hospitalizations:   Any hospitalizations or ED visits within the last 12 months?: No      Advance Care Planning:   Living will: Yes    Durable POA for healthcare: Yes    Advanced directive: Yes    End of Life Decisions reviewed with patient: No      Cognitive Screening:   Provider or family/friend/caregiver concerned regarding cognition?: No    PREVENTIVE SCREENINGS      Cardiovascular Screening:    General: Screening Not Indicated and History Lipid Disorder      Diabetes Screening:     General: Screening Current      Colorectal Cancer Screening:     General: Screening Current      Prostate Cancer Screening:    General: Risks and Benefits Discussed      Osteoporosis Screening:    General: Screening Not Indicated      Abdominal Aortic Aneurysm (AAA) Screening:    Risk factors include: age between 73-69 yo        General: Screening Not Indicated      Lung Cancer Screening:     General: Screening Not Indicated      Hepatitis C Screening:    General: Risks and Benefits Discussed    Screening, Brief Intervention, and Referral to Treatment (SBIRT)    Screening  Typical number of drinks in a day: 0  Typical number of drinks in a week: 1  Interpretation: Low risk drinking behavior  Single Item Drug Screening:  How often have you used an illegal drug (including marijuana) or a prescription medication for non-medical reasons in the past year? never    Single Item Drug Screen Score: 0  Interpretation: Negative screen for possible drug use disorder    Other Counseling Topics:   Car/seat belt/driving safety and regular weightbearing exercise         Marley Lloyd Ayaz, DO

## 2022-04-19 ENCOUNTER — APPOINTMENT (OUTPATIENT)
Dept: LAB | Facility: CLINIC | Age: 67
End: 2022-04-19
Payer: COMMERCIAL

## 2022-04-19 DIAGNOSIS — Z12.5 PROSTATE CANCER SCREENING: ICD-10-CM

## 2022-04-19 DIAGNOSIS — R89.9 ABNORMAL LABORATORY TEST: ICD-10-CM

## 2022-04-19 LAB
HBV CORE AB SER QL: REACTIVE
HBV CORE IGM SER QL: NORMAL
PSA SERPL-MCNC: 0.7 NG/ML (ref 0–4)

## 2022-04-19 PROCEDURE — 87517 HEPATITIS B DNA QUANT: CPT

## 2022-04-19 PROCEDURE — 36415 COLL VENOUS BLD VENIPUNCTURE: CPT

## 2022-04-19 PROCEDURE — 86705 HEP B CORE ANTIBODY IGM: CPT

## 2022-04-19 PROCEDURE — 86704 HEP B CORE ANTIBODY TOTAL: CPT

## 2022-04-19 PROCEDURE — G0103 PSA SCREENING: HCPCS

## 2022-04-20 LAB
HBV DNA SERPL NAA+PROBE-ACNC: NORMAL IU/ML
HBV DNA SERPL NAA+PROBE-LOG IU: NORMAL LOG10 IU/ML
REF LAB TEST REF RANGE: NORMAL

## 2022-06-30 ENCOUNTER — COSMETIC (OUTPATIENT)
Dept: DERMATOLOGY | Age: 67
End: 2022-06-30

## 2022-06-30 ENCOUNTER — OFFICE VISIT (OUTPATIENT)
Dept: DERMATOLOGY | Age: 67
End: 2022-06-30
Payer: COMMERCIAL

## 2022-06-30 VITALS — BODY MASS INDEX: 28.88 KG/M2 | TEMPERATURE: 98.2 F | WEIGHT: 195 LBS | HEIGHT: 69 IN

## 2022-06-30 DIAGNOSIS — L82.1 SEBORRHEIC KERATOSIS: ICD-10-CM

## 2022-06-30 DIAGNOSIS — D22.60 MULTIPLE BENIGN MELANOCYTIC NEVI OF UPPER AND LOWER EXTREMITIES AND TRUNK: ICD-10-CM

## 2022-06-30 DIAGNOSIS — D22.5 MULTIPLE BENIGN MELANOCYTIC NEVI OF UPPER AND LOWER EXTREMITIES AND TRUNK: ICD-10-CM

## 2022-06-30 DIAGNOSIS — L82.1 SEBORRHEIC KERATOSIS: Primary | ICD-10-CM

## 2022-06-30 DIAGNOSIS — D22.70 MULTIPLE BENIGN MELANOCYTIC NEVI OF UPPER AND LOWER EXTREMITIES AND TRUNK: ICD-10-CM

## 2022-06-30 DIAGNOSIS — L81.4 SOLAR LENTIGO: Primary | ICD-10-CM

## 2022-06-30 PROCEDURE — 17000 DESTRUCT PREMALG LESION: CPT | Performed by: DERMATOLOGY

## 2022-06-30 PROCEDURE — 99213 OFFICE O/P EST LOW 20 MIN: CPT | Performed by: DERMATOLOGY

## 2022-06-30 PROCEDURE — SKNTGDERM SKIN TAG DERM ADD ON: Performed by: DERMATOLOGY

## 2022-06-30 RX ORDER — MULTIVIT-MIN/IRON/FOLIC ACID/K 18-600-40
CAPSULE ORAL
COMMUNITY
Start: 2010-01-03

## 2022-06-30 RX ORDER — LANOLIN ALCOHOL/MO/W.PET/CERES
CREAM (GRAM) TOPICAL
COMMUNITY
Start: 2010-01-03

## 2022-06-30 RX ORDER — ECHINACEA 500 MG
CAPSULE ORAL
COMMUNITY
Start: 2010-01-03

## 2022-06-30 NOTE — PROGRESS NOTES
[facility] Follow Up Note    Patient Name: Sebastian Villegas  Encounter Date: 06/30/2022    Today's Chief Concerns:  Fatoumata Velazco Concern #1:  Full skin exam   Current Medications:    Current Outpatient Medications:     Ascorbic Acid (Vitamin C) 500 MG CAPS, , Disp: , Rfl:     aspirin (ECOTRIN LOW STRENGTH) 81 mg EC tablet, Take 81 mg by mouth daily, Disp: , Rfl:     Echinacea 500 MG CAPS, , Disp: , Rfl:     ezetimibe (ZETIA) 10 mg tablet, Take 10 mg by mouth daily, Disp: , Rfl: 0    Ginkgo Biloba 120 MG CAPS, , Disp: , Rfl:     lisinopril (ZESTRIL) 5 mg tablet, Take 5 mg by mouth daily, Disp: , Rfl:     metoprolol succinate (TOPROL-XL) 50 mg 24 hr tablet, Take 1 tablet (50 mg total) by mouth daily, Disp: 90 tablet, Rfl: 0    Multiple Vitamins-Minerals (CENTRUM SILVER 50+MEN PO), , Disp: , Rfl:     nitroglycerin (NITROSTAT) 0 4 mg SL tablet, Prn, Disp: , Rfl: 0    rosuvastatin (CRESTOR) 40 MG tablet, Take 40 mg by mouth daily, Disp: , Rfl: 0    vitamin B-12 (VITAMIN B-12) 1,000 mcg tablet, , Disp: , Rfl:     CONSTITUTIONAL:   Vitals:    06/30/22 0948   Temp: 98 2 °F (36 8 °C)   TempSrc: Temporal   Weight: 88 5 kg (195 lb)   Height: 5' 9" (1 753 m)       Specific Alerts:    Have you been seen by a [facility]ologist in the last 3 years? YES      No Known Allergies    May we call your Preferred Phone number to discuss your specific medical information? YES    May we leave a detailed message that includes your specific medical information? YES    Have you traveled outside of the [facility] in the past 3 months? No    Do you currently have a pacemaker or defibrillator? No    Do you have any artificial heart valves, joints, plates, screws, rods, stents, pins, etc? YES, stent    - If Yes, were any placed within the last 2 years?no 1999     Do you require any medications prior to a surgical procedure?  No       Are you taking any medications that cause you to bleed more easily ("blood thinners") YES    Have you ever experienced a rapid heartbeat with epinephrine? No    Have you ever been treated with "gold" (gold sodium thiomalate) therapy? No    [facility] can help with wrinkles, "laugh lines," facial volume loss, "double chin," "love handles," age spots, and more  Are you interested in learning today about some of the skin enhancement procedures that we offer? (If Yes, please provide more detail) No    Review of Systems:  Have you recently had or currently have any of the following?     · Fever or chills: No  · Night Sweats: No  · Headaches: No  · Weight Gain: No  · Weight Loss: No  · Blurry Vision: No  · Nausea: No  · Vomiting: No  · Diarrhea: No  · Blood in Stool: No  · Abdominal Pain: No  · Itchy Skin: No  · Painful Joints: No  · Swollen Joints: No  · Muscle Pain: No  · Irregular Mole: No  · Sun Burn: No  · Dry Skin: No  · Skin Color Changes: No  · Scar or Keloid: No  · Cold Sores/Fever Blisters: No  · Bacterial Infections/MRSA: No  · Anxiety: No  · Depression: No  · Suicidal or Homicidal Thoughts: No      PSYCH: Normal mood and affect  EYES: Normal conjunctiva  ENT: Normal lips and oral mucosa  CARDIOVASCULAR: No edema  RESPIRATORY: Normal respirations  HEME/LYMPH/IMMUNO:  No regional lymphadenopathy except as noted below in ASSESSMENT AND PLAN BY DIAGNOSIS    FULL ORGAN SYSTEM SKIN EXAM (SKIN)   Hair, Scalp, Ears, Face Normal except as noted below in Assessment   Neck, Cervical Chain Nodes Normal except as noted below in Assessment   Right Arm/Hand/Fingers Normal except as noted below in Assessment   Left Arm/Hand/Fingers Normal except as noted below in Assessment   Chest/Breasts/Axillae Viewed areas Normal except as noted below in Assessment   Abdomen, Umbilicus Normal except as noted below in Assessment   Back/Spine Normal except as noted below in Assessment   Groin/Genitalia/Buttocks Viewed areas Normal except as noted below in Assessment   Right Leg, Foot, Toes Normal except as noted below in Assessment   Left Leg, Foot, Toes Normal except as noted below in Assessment     ACTINIC KERATOSIS    Physical Exam:   Anatomic Location Affected:  Mid forehead    Morphological Description:  Pink scaly papule     Additional History of Present Condition:  Patient is here for skin exam     Assessment and Plan:  Based on a thorough discussion of this condition and the management approach to it (including a comprehensive discussion of the known risks, side effects and potential benefits of treatment), the patient (family) agrees to implement the following specific plan:     Cryotherapy done in office today    Area will become red and puffy  Scab up and fall off in 2-3 weeks     Actinic keratoses are very common on sites repeatedly exposed to the sun, especially the backs of the hands and the face, most often affecting the ears, nose, cheeks, upper lip, vermilion of the lower lip, temples, forehead and balding scalp  In severely chronically sun-damaged individuals, they may also be found on the upper trunk, upper and lower limbs, and dorsum of feet  We discussed the theoretical premalignant (pre-cancerous) nature and etiology of these growths  We discussed the prevailing notion that actinic keratoses are a reflection of abnormal skin cell development due to DNA damage by short wavelength UVB  They are more likely to appear if the immune function is poor, due to aging, recent sun exposure, predisposing disease or certain drugs  We discussed that the main concern is that actinic keratoses may predispose to squamous cell carcinoma  It is rare for a solitary actinic keratosis to evolve to squamous cell carcinoma (SCC), but the risk of SCC occurring at some stage in a patient with more than 10 actinic keratoses is thought to be about 10 to 15%  A tender, thickened, ulcerated or enlarging actinic keratosis is suspicious of SCC  Actinic keratoses may be prevented by strict sun protection   If already present, keratoses may improve with a very high sun protection factor (50+) broad-spectrum sunscreen applied at least daily to affected areas, year-round  We recommend that UPF-rated clothing and hats and sunglasses be worn whenever possible and that a sunscreen-moisturizer combination product such as Neutrogena Daily Defense be applied at least three times a day  We performed a thorough discussion of treatment options and specific risk/benefits/alternatives including but not limited to medical field treatment with medications such as the following:     Topical field area medications such as 5-fluorouracil or Aldara (specifically, the trouble with long-term compliance, blistering and local skin reaction versus the convenience of at-home therapy and that field therapy gets what is not yet seen)   Cryotherapy (specifically, local pain, scarring, dyspigmentation, blistering, possible superinfection, and treats only what we see versus directed treatment today)   Photodynamic therapy (specifically, local pain, scarring, dyspigmentation, blistering, possible superinfection, need to schedule for a later date, and time spent in the office versus field therapy that gets what is not yet seen)  LENTIGO    Physical Exam:   Anatomic Location Affected:  Trunk, upper and lower extremities    Morphological Description:  Several tan brown macules    Pertinent Positives:   Pertinent Negatives:     Additional History of Present Condition:  Patient is here for skin exam     Assessment and Plan:  Based on a thorough discussion of this condition and the management approach to it (including a comprehensive discussion of the known risks, side effects and potential benefits of treatment), the patient (family) agrees to implement the following specific plan:   Monitor for changes    When outside we recommend using a wide brim hat, sunglasses, long sleeve and pants, sunscreen with SPF 30+ with reapplication every 2 hours, or SPF specific clothing     What is a lentigo? A lentigo is a pigmented flat or slightly raised lesion with a clearly defined edge  Unlike an ephelis (freckle), it does not fade in the winter months  There are several kinds of lentigo  The name lentigo originally referred to its appearance resembling a small lentil  The plural of lentigo is lentigines, although lentigos is also in common use  Who gets lentigines? Lentigines can affect males and females of all ages and races  Solar lentigines are especially prevalent in fair skinned adults  Lentigines associated with syndromes are present at birth or arise during childhood  What causes lentigines? Common forms of lentigo are due to exposure to ultraviolet radiation:   Sun damage including sunburn    Indoor tanning    Phototherapy, especially photochemotherapy (PUVA)    Ionizing radiation, eg radiation therapy, can also cause lentigines  Several familial syndromes associated with widespread lentigines originate from mutations in Jona-MAP kinase, mTOR signaling and PTEN pathways  What are the clinical features of lentigines? Lentigines have been classified into several different types depending on what they look like, where they appear on the body, causative factors, and whether they are associated to other diseases or conditions  Lentigines may be solitary or more often, multiple  Most lentigines are smaller than 5 mm in diameter      Lentigo simplex   A precursor to junctional naevus    Arises during childhood and early adult life    Found on trunk and limbs    Small brown round or oval macule or thin plaque    Jagged or smooth edge    May have a dry surface    May disappear in time  Solar lentigo   A precursor to seborrhoeic keratosis    Found on chronically sun exposed sites such as hands, face, lower legs    May also follow sunburn to shoulders    Yellow, light or dark brown regular or irregular macule or thin plaque    May have a dry surface    Often has moth-eaten outline    Can slowly enlarge to several centimeters in diameter    May disappear, often through the process known as lichenoid keratosis    When atypical in appearance, may be difficult to distinguish from melanoma in situ  Ink spot lentigo   Also known as reticulated lentigo    Few in number compared to solar lentigines    Follows sunburn in very fair skinned individuals    Dark brown to black irregular ink spot-like macule  PUVA lentigo   Similar to ink spot lentigo but follows photochemotherapy (PUVA)    Location anywhere exposed to PUVA  Tanning bed lentigo   Similar to ink spot lentigo but follows indoor tanning    Location anywhere exposed to tanning bed  Radiation lentigo   Occurs in site of irradiation (accidental or therapeutic)    Associated with late-stage radiation dermatitis: epidermal atrophy, subcutaneous fibrosis, keratosis, telangiectasias  Melanotic macule   Mucosal surfaces or adjacent glabrous skin eg lip, vulva, penis, anus    Light to dark brown    Also called mucosal melanosis  Generalised lentigines   Found on any exposed or covered site from early childhood    Small macules may merge to form larger patches    Not associated with a syndrome    Also called lentigines profusa, multiple lentigines  Agminated lentigines   Naevoid eruption of lentigos confined to a single segmental area    Sharp demarcation in midline    May have associated neurological and developmental abnormalities  Patterned lentigines   Inherited tendency to lentigines on face, lips, buttocks, palms, soles    Recognised mainly in people of  ethnicity  Centrofacial neurodysraphic lentiginosis  Associated with mental retardation  Lentiginosis syndromes   Syndromes include LEOPARD/[place], Peutz-Jeghers, Laugier-Hunziker, Moynahan, Xeroderma pigmentosum, myxoma syndromes (HINSON, NAME, Walters), Ruvalcaba-Myhre-Barrow, Bannayan-Zonvictorianoa syndrome, Cowden disease (multiple hamartoma syndrome )    Inheritance is autosomal dominant; sporadic cases common    Widespread lentigines present at birth or arise in early childhood    Associated with neural, endocrine, and mesenchymal tumors    How is the diagnosis made? Lentigines are usually diagnosed clinically by their typical appearance  Concern regarding possibility of melanoma may lead to:   Dermatoscopy    Diagnostic excision biopsy    Histopathology of a lentigo shows:   Thickened epidermis    An increased number of melanocytes along the basal layer of epidermis    Unlike junctional melanocytic naevus, there are no nests of melanocytes    Increased melanin pigment within the keratinocytes    Additional features depending on type of lentigo    In contrast, an ephelis (freckle) shows sun-induced increased melanin within the keratinocytes, without an increase in number of cells  What is the treatment for lentigines? Most lentigines are left alone  Attempts to lighten them may not be successful  The following approaches are used:   SPF 50+ broad-spectrum sunscreen    Hydroquinone bleaching cream    Alpha hydroxy acids    Vitamin C    Retinoids    Azelaic acid    Chemical peels  Individual lesions can be permanently removed using:   Cryotherapy    Intense pulsed light    Pigment lasers    How can lentigines be prevented? Lentigines associated with exposure ultraviolet radiation can be prevented by very careful sun protection  Clothing is more successful at preventing new lentigines than are sunscreens  What is the outlook for lentigines? Lentigines usually persist  They may increase in number with age and sun exposure  Some in sun-protected sites may fade and disappear      MELANOCYTIC NEVI ("Moles")    Physical Exam:   Anatomic Location Affected:   Mostly on sun-exposed areas of the trunk, upper and lower extremities    Morphological Description:  Scattered, 1-4mm round to ovoid, symmetrical-appearing, even bordered, skin colored to dark brown macules/papules, mostly in sun-exposed areas   Pertinent Positives:   Pertinent Negatives: Additional History of Present Condition:  Patient is here for skin exam  Father with history of skin cancer     Assessment and Plan:  Based on a thorough discussion of this condition and the management approach to it (including a comprehensive discussion of the known risks, side effects and potential benefits of treatment), the patient (family) agrees to implement the following specific plan:   Monitor for changes    When outside we recommend using a wide brim hat, sunglasses, long sleeve and pants, sunscreen with SPF 02+ with reapplication every 2 hours, or SPF specific clothing    Routine skin exam recommended yearly      Melanocytic Nevi  Melanocytic nevi ("moles") are caused by collections of the color producing skin cells, or melanocytes, in 1 area in the skin  They can range in color from pink to dark brown and be either raised or flat  Some moles are present at birth (I e , "congenital nevi"), while others come up later in life (i e , "acquired nevi")  Malcolm Theresa exposure also stimulates the body to make more moles, ie the more sun you get the more moles you'll grow  Clinically distinguishing a healthy mole from melanoma may be difficult  The "ABCDE's" of moles have been suggested as a means of helping to alert a person to a suspicious mole and the possible increased risk of melanoma  Asymmetry: Healthy moles tend to be symmetric, while melanomas are often asymmetric  Asymmetry means if you draw a line through the mole, the two halves do not match in color, size, shape, or surface texture Any mole that starts to demonstrate "asymmetry" should be examined promptly by a board certified dermatologist      Border: Healthy moles tend to have discrete, even borders    The border of a melanoma often blends into the normal skin and does not sharply delineate the mole from normal skin  Any mole that starts to demonstrate "uneven borders" should be examined promptly     Color: Healthy moles tend to be one color throughout  Melanomas tend to be made up of different colors ranging from dark black, blue, white, or red  Any mole that demonstrates a color change should be examined promptly    Diameter: Healthy moles tend to be smaller than 0 6 cm in size; an exception are "congenital nevi" that can be larger  Melanomas tend to grow and can often be greater than 0 6 cm (1/4 of an inch, or the size of a pencil eraser)  This is only a guideline, and many normal moles may be larger than 0 6 cm without being unhealthy  Any mole that starts to change in size (small to bigger or bigger to smaller) should be examined promptly    Evolving: Healthy moles tend to "stay the same "  Melanomas may often show signs of change or evolution such as a change in size, shape, color, or elevation  Any mole that starts to itch, bleed, crust, burn, hurt, or ulcerate or demonstrate a change or evolution should be examined promptly by a board certified dermatologist       What are atypical moles or dysplastic nevi? Dysplastic moles are moles that have some of the ABCDE  changes listed above but  are not cancerous  Sometimes a biopsy and microscopic examination are needed to determine the difference  They may indicate an increased risk of melanoma in that person, especially if there is a family history of melanoma  What is a Melanoma? The main concern when looking at a new or changing mole it to evaluate whether it may be a melanoma  The appearance of a "new mole" remains one of the most reliable methods for identifying a malignant melanoma  A melanoma is a type of skin cancer that can be deadly if it spreads throughout the body  The prognosis of a Melanoma depends on how deep it has penetrated in the skin    If caught early, they generally will not have had time to grow into the deeper layers of the skin and they cure rate is then very high  Once the melanoma grows deeper into the skin, the cure rate drops dramatically  Therefore, early detection and removal of a malignant melanoma results in a much better chance of complete cure  SEBORRHEIC KERATOSIS; NON-INFLAMED    Physical Exam:   Anatomic Location Affected:  Trunk, scalp, left axilla    Morphological Description:  Flat and raised, waxy, smooth to warty textured, yellow to brownish-grey to dark brown to blackish, discrete, "stuck-on" appearing papules   Pertinent Positives:   Pertinent Negatives: Additional History of Present Condition:  Patient reports new bumps on the skin  Denies itch, burn, pain, bleeding or ulceration  Present constantly; nothing seems to make it worse or better  No prior treatment  Assessment and Plan:  Based on a thorough discussion of this condition and the management approach to it (including a comprehensive discussion of the known risks, side effects and potential benefits of treatment), the patient (family) agrees to implement the following specific plan:   Reassured benign    Removal discussed $20 each or $150 for up to 10 lesions (cosmetic)     Seborrheic Keratosis  A seborrheic keratosis is a harmless warty spot that appears during adult life as a common sign of skin aging  Seborrheic keratoses can arise on any area of skin, covered or uncovered, with the usual exception of the palms and soles  They do not arise from mucous membranes  Seborrheic keratoses can have highly variable appearance  Seborrheic keratoses are extremely common  It has been estimated that over 90% of adults over the age of 61 years have one or more of them  They occur in males and females of all races, typically beginning to erupt in the 35s or 45s  They are uncommon under the age of 21 years  The precise cause of seborrhoeic keratoses is not known    Seborrhoeic keratoses are considered degenerative in nature  As time goes by, seborrheic keratoses tend to become more numerous  Some people inherit a tendency to develop a very large number of them; some people may have hundreds of them  The name "seborrheic keratosis" is misleading, because these lesions are not limited to a seborrhoeic distribution (scalp, mid-face, chest, upper back), nor are they formed from sebaceous glands, nor are they associated with sebum -- which is greasy  Seborrheic keratosis may also be called "SK," "Seb K," "basal cell papilloma," "senile wart," or "barnacle "      Researchers have noted:   Eruptive seborrhoeic keratoses can follow sunburn or dermatitis   Skin friction may be the reason they appear in body folds   Viral cause (e g , human papillomavirus) seems unlikely   Stable and clonal mutations or activation of FRFR3, PIK3CA, MAURICIO, AKT1 and EGFR genes are found in seborrhoeic keratoses   Seborrhoeic keratosis can arise from solar lentigo   FRFR3 mutations also arise in solar lentigines  These mutations are associated with increased age and location on the head and neck, suggesting a role of ultraviolet radiation in these lesions   Seborrheic keratoses do not harbour tumour suppressor gene mutations   Epidermal growth factor receptor inhibitors, which are used to treat some cancers, often result in an increase in verrucal (warty) keratoses  There is no easy way to remove multiple lesions on a single occasion  Unless a specific lesion is "inflamed" and is causing pain or stinging/burning or is bleeding, most insurance companies do not authorize treatment      PROCEDURE:  DESTRUCTION OF PRE-MALIGNANT LESIONS  After a thorough discussion of treatment options and risk/benefits/alternatives (including but not limited to local pain, scarring, dyspigmentation, blistering, and possible superinfection), verbal and written consent were obtained and the aforementioned lesions were treated on with cryotherapy using liquid nitrogen x 1 cycle for 5-10 seconds   TOTAL NUMBER of 1 pre-malignant lesions were treated today on the ANATOMIC LOCATION: mid forehead   The patient tolerated the procedure well, and after-care instructions were provided      Scribe Attestation    I,:  Pineda Alicea am acting as a scribe while in the presence of the attending physician :       I,:  Melly Tavera MD personally performed the services described in this documentation    as scribed in my presence :

## 2022-06-30 NOTE — PATIENT INSTRUCTIONS
COSMETIC REMOVAL (SEBORRHEIC KERATOSIS; NON-INFLAMED)    Assessment and Plan:  Based on a thorough discussion of this condition and the management approach to it (including a comprehensive discussion of the known risks, side effects and potential benefits of treatment), the patient (family) agrees to implement the following specific plan:  Cryotherapy done in office   Reassured areas will become red and puffy   Scab up and fall off in 2-3 weeks

## 2022-06-30 NOTE — PROGRESS NOTES
SEBORRHEIC KERATOSIS; NON-INFLAMED    Physical Exam:   Anatomic Location Affected:  Trunk    Morphological Description:  Flat and raised, waxy, smooth to warty textured, yellow to brownish-grey to dark brown to blackish, discrete, "stuck-on" appearing papules   Pertinent Positives:   Pertinent Negatives: Additional History of Present Condition:  Patient reports new bumps on the skin  Denies itch, burn, pain, bleeding or ulceration  Present constantly; nothing seems to make it worse or better  No prior treatment  Assessment and Plan:  Based on a thorough discussion of this condition and the management approach to it (including a comprehensive discussion of the known risks, side effects and potential benefits of treatment), the patient (family) agrees to implement the following specific plan:   Cryotherapy done in office    Reassured areas will become red and puffy  Scab up and fall off in 2-3 weeks     COSMETIC PROCEDURE:  DESTRUCTION OF BENIGN LESIONS  After a thorough discussion of treatment options and risk/benefits/alternatives (including but not limited to local pain, scarring, dyspigmentation, blistering, and possible superinfection), verbal and written consent were obtained and the aforementioned lesions were treated on with cryotherapy using liquid nitrogen x 1 cycle for 5-10 seconds   TOTAL NUMBER of 5 lesions were treated today on the ANATOMIC LOCATION: back   The patient tolerated the procedure well, and after-care instructions were provided      DO NOT BILL INSURANCE   Scribe Attestation    I,:  Myranda Bolivar am acting as a scribe while in the presence of the attending physician :       I,:  Gary Mishra MD personally performed the services described in this documentation    as scribed in my presence :

## 2022-06-30 NOTE — PATIENT INSTRUCTIONS
ACTINIC KERATOSIS    Assessment and Plan:  Based on a thorough discussion of this condition and the management approach to it (including a comprehensive discussion of the known risks, side effects and potential benefits of treatment), the patient (family) agrees to implement the following specific plan:    Cryotherapy done in office today   Area will become red and puffy  Scab up and fall off in 2-3 weeks     Actinic keratoses are very common on sites repeatedly exposed to the sun, especially the backs of the hands and the face, most often affecting the ears, nose, cheeks, upper lip, vermilion of the lower lip, temples, forehead and balding scalp  In severely chronically sun-damaged individuals, they may also be found on the upper trunk, upper and lower limbs, and dorsum of feet  LENTIGO    Assessment and Plan:  Based on a thorough discussion of this condition and the management approach to it (including a comprehensive discussion of the known risks, side effects and potential benefits of treatment), the patient (family) agrees to implement the following specific plan:  Monitor for changes   When outside we recommend using a wide brim hat, sunglasses, long sleeve and pants, sunscreen with SPF 97+ with reapplication every 2 hours, or SPF specific clothing     What is a lentigo? A lentigo is a pigmented flat or slightly raised lesion with a clearly defined edge  Unlike an ephelis (freckle), it does not fade in the winter months  There are several kinds of lentigo  The name lentigo originally referred to its appearance resembling a small lentil  The plural of lentigo is lentigines, although lentigos is also in common use        MELANOCYTIC NEVI ("Moles")    Assessment and Plan:  Based on a thorough discussion of this condition and the management approach to it (including a comprehensive discussion of the known risks, side effects and potential benefits of treatment), the patient (family) agrees to implement the following specific plan:  Monitor for changes   When outside we recommend using a wide brim hat, sunglasses, long sleeve and pants, sunscreen with SPF 11+ with reapplication every 2 hours, or SPF specific clothing   Routine skin exam recommended yearly      Melanocytic Nevi  Melanocytic nevi ("moles") are caused by collections of the color producing skin cells, or melanocytes, in 1 area in the skin  They can range in color from pink to dark brown and be either raised or flat  Some moles are present at birth (I e , "congenital nevi"), while others come up later in life (i e , "acquired nevi")  Bernette Cherelle exposure also stimulates the body to make more moles, ie the more sun you get the more moles you'll grow  Clinically distinguishing a healthy mole from melanoma may be difficult  The "ABCDE's" of moles have been suggested as a means of helping to alert a person to a suspicious mole and the possible increased risk of melanoma  Asymmetry: Healthy moles tend to be symmetric, while melanomas are often asymmetric  Asymmetry means if you draw a line through the mole, the two halves do not match in color, size, shape, or surface texture Any mole that starts to demonstrate "asymmetry" should be examined promptly by a board certified dermatologist      Border: Healthy moles tend to have discrete, even borders  The border of a melanoma often blends into the normal skin and does not sharply delineate the mole from normal skin  Any mole that starts to demonstrate "uneven borders" should be examined promptly     Color: Healthy moles tend to be one color throughout  Melanomas tend to be made up of different colors ranging from dark black, blue, white, or red  Any mole that demonstrates a color change should be examined promptly    Diameter: Healthy moles tend to be smaller than 0 6 cm in size; an exception are "congenital nevi" that can be larger    Melanomas tend to grow and can often be greater than 0 6 cm (1/4 of an inch, or the size of a pencil eraser)  This is only a guideline, and many normal moles may be larger than 0 6 cm without being unhealthy  Any mole that starts to change in size (small to bigger or bigger to smaller) should be examined promptly    Evolving: Healthy moles tend to "stay the same "  Melanomas may often show signs of change or evolution such as a change in size, shape, color, or elevation    Any mole that starts to itch, bleed, crust, burn, hurt, or ulcerate or demonstrate a change or evolution should be examined promptly by a board certified dermatologist

## 2022-07-01 ENCOUNTER — APPOINTMENT (OUTPATIENT)
Dept: LAB | Facility: CLINIC | Age: 67
End: 2022-07-01
Payer: COMMERCIAL

## 2022-07-01 DIAGNOSIS — R79.89 ABNORMAL CBC: ICD-10-CM

## 2022-07-01 LAB
BASOPHILS # BLD AUTO: 0.06 THOUSANDS/ΜL (ref 0–0.1)
BASOPHILS NFR BLD AUTO: 1 % (ref 0–1)
EOSINOPHIL # BLD AUTO: 0.22 THOUSAND/ΜL (ref 0–0.61)
EOSINOPHIL NFR BLD AUTO: 3 % (ref 0–6)
ERYTHROCYTE [DISTWIDTH] IN BLOOD BY AUTOMATED COUNT: 13.2 % (ref 11.6–15.1)
HCT VFR BLD AUTO: 46 % (ref 36.5–49.3)
HGB BLD-MCNC: 16.3 G/DL (ref 12–17)
IMM GRANULOCYTES # BLD AUTO: 0.03 THOUSAND/UL (ref 0–0.2)
IMM GRANULOCYTES NFR BLD AUTO: 0 % (ref 0–2)
LYMPHOCYTES # BLD AUTO: 2.38 THOUSANDS/ΜL (ref 0.6–4.47)
LYMPHOCYTES NFR BLD AUTO: 33 % (ref 14–44)
MCH RBC QN AUTO: 29.7 PG (ref 26.8–34.3)
MCHC RBC AUTO-ENTMCNC: 35.4 G/DL (ref 31.4–37.4)
MCV RBC AUTO: 84 FL (ref 82–98)
MONOCYTES # BLD AUTO: 0.67 THOUSAND/ΜL (ref 0.17–1.22)
MONOCYTES NFR BLD AUTO: 9 % (ref 4–12)
NEUTROPHILS # BLD AUTO: 3.82 THOUSANDS/ΜL (ref 1.85–7.62)
NEUTS SEG NFR BLD AUTO: 54 % (ref 43–75)
NRBC BLD AUTO-RTO: 0 /100 WBCS
PLATELET # BLD AUTO: 183 THOUSANDS/UL (ref 149–390)
PMV BLD AUTO: 10.1 FL (ref 8.9–12.7)
RBC # BLD AUTO: 5.48 MILLION/UL (ref 3.88–5.62)
WBC # BLD AUTO: 7.18 THOUSAND/UL (ref 4.31–10.16)

## 2022-07-01 PROCEDURE — 85025 COMPLETE CBC W/AUTO DIFF WBC: CPT

## 2022-07-01 PROCEDURE — 36415 COLL VENOUS BLD VENIPUNCTURE: CPT

## 2022-07-15 ENCOUNTER — OFFICE VISIT (OUTPATIENT)
Dept: HEMATOLOGY ONCOLOGY | Facility: MEDICAL CENTER | Age: 67
End: 2022-07-15
Payer: COMMERCIAL

## 2022-07-15 VITALS
WEIGHT: 195.2 LBS | BODY MASS INDEX: 28.91 KG/M2 | DIASTOLIC BLOOD PRESSURE: 90 MMHG | SYSTOLIC BLOOD PRESSURE: 132 MMHG | HEIGHT: 69 IN | RESPIRATION RATE: 17 BRPM | OXYGEN SATURATION: 96 % | TEMPERATURE: 98.2 F | HEART RATE: 71 BPM

## 2022-07-15 DIAGNOSIS — R89.9 ABNORMAL LABORATORY TEST: Primary | ICD-10-CM

## 2022-07-15 DIAGNOSIS — D58.2 ELEVATED HEMOGLOBIN (HCC): ICD-10-CM

## 2022-07-15 PROCEDURE — 99213 OFFICE O/P EST LOW 20 MIN: CPT | Performed by: INTERNAL MEDICINE

## 2022-07-15 PROCEDURE — 1160F RVW MEDS BY RX/DR IN RCRD: CPT | Performed by: INTERNAL MEDICINE

## 2022-07-15 NOTE — PROGRESS NOTES
Sacha Torres  1955  [facility] [address] [address]  HEMATOLOGY/ONCOLOGY CONSULTATION REPORT    DISCUSSION/SUMMARY:    19-year-old male with relatively good medical history, stable cardiac issues recently found to have an elevated hemoglobin level, elevated hematocrit level and slightly decreased platelet count  Mr Ivonne Partida feels well and clinically there are no concerning findings  The CBC parameters are trended below  The most recent CBC from early July 2022 demonstrates a normal white count, differential, normal H/H and normal platelet count  As above, patient is completely asymptomatic  No additional hematology workup is needed at this time  Mr Ivonne Partida will continue his routine follow-ups with his PCP, CBC/differential can be checked periodically  Return to this office is on a prn basis; patient knows to call the Hematology office if he has any other questions or concerns  Carefully review your medication list and verify that the list is accurate and up-to-date  Please call the hematology/oncology office if there are medications missing from the list, medications on the list that you are not currently taking or if there is a dosage or instruction that is different from how you're taking that medication  Patient goals and areas of care: Follow-up with PCP  Barriers to care: none  Patient is able to self-care   ______________________________________________________________________________________    Chief Complaint   Patient presents with    Follow-up    CBC abnormalities     History of Present Illness:  19-year-old male previously referred for evaluation of CBC abnormalities  Mr Ivonne Partida suffered an MI in 1999  Stent was placed  Patient has been followed by Cardiology  Patient underwent CABG in 2009    No recent cardiac issues but recent routine blood work demonstrated an elevated hemoglobin level, elevated hematocrit level and slightly decreased platelet level  The plan has been surveillance  Mirta Griffiths returns for follow-up  Patient states feeling quite well  No fevers, chills or sweats  No recent infections  Appetite is good, weight is stable  Patient continues to be very active, no cardiac issues  Routine health maintenance and medical care is up-to-date patient has received the COVID vaccine  Review of Systems   Constitutional: Negative  HENT: Negative  Eyes: Negative  Respiratory: Negative  Cardiovascular: Negative  Gastrointestinal: Negative  Endocrine: Negative  Genitourinary: Negative  Musculoskeletal: Negative  Skin: Negative  Allergic/Immunologic: Negative  Neurological: Negative  Hematological: Negative  Psychiatric/Behavioral: Negative  All other systems reviewed and are negative      Patient Active Problem List   Diagnosis    Coronary artery disease involving native coronary artery    H/O heart artery stent    Prediabetes    Hx of CABG    Hyperlipidemia    Prostate cancer screening    Left inguinal hernia    Immunization due    Elevated hemoglobin (HCC)    Abnormal laboratory test    Medicare annual wellness visit, subsequent     Past Medical History:   Diagnosis Date    Heart problem      Past Surgical History:   Procedure Laterality Date    ARTERIAL BYPASS SURGERY     Past surgical history:  No prior blood transfusions    Family History   Problem Relation Age of Onset    Stroke Father     Rectal cancer Father     Alzheimer's disease Mother    Family history:  No known familial or genetic diseases    Social History     Socioeconomic History    Marital status: Single     Spouse name: Not on file    Number of children: Not on file    Years of education: Not on file    Highest education level: Not on file   Occupational History    Not on file   Tobacco Use    Smoking status: Never Smoker    Smokeless tobacco: Never Used   Substance and Sexual Activity    Alcohol use: Not on file    Drug use: Not on file    Sexual activity: Not on file   Other Topics Concern    Not on file   Social History Narrative    Not on file     Social Determinants of Health     Financial Resource Strain: Not on file   Food Insecurity: Not on file   Transportation Needs: Not on file   Physical Activity: Not on file   Stress: Not on file   Social Connections: Not on file   Intimate Partner Violence: Not on file   Housing Stability: Not on file   Social history:  No tobacco, alcohol or drug abuse, patient worked as a  for many years - only toxic exposure was secondhand cigarette use in the 76s and 80s    Current Outpatient Medications:     Ascorbic Acid (Vitamin C) 500 MG CAPS, , Disp: , Rfl:     aspirin (ECOTRIN LOW STRENGTH) 81 mg EC tablet, Take 81 mg by mouth daily, Disp: , Rfl:     Echinacea 500 MG CAPS, , Disp: , Rfl:     ezetimibe (ZETIA) 10 mg tablet, Take 10 mg by mouth daily, Disp: , Rfl: 0    Ginkgo Biloba 120 MG CAPS, , Disp: , Rfl:     lisinopril (ZESTRIL) 5 mg tablet, Take 5 mg by mouth daily, Disp: , Rfl:     metoprolol succinate (TOPROL-XL) 50 mg 24 hr tablet, Take 1 tablet (50 mg total) by mouth daily, Disp: 90 tablet, Rfl: 0    Multiple Vitamins-Minerals (CENTRUM SILVER 50+MEN PO), , Disp: , Rfl:     nitroglycerin (NITROSTAT) 0 4 mg SL tablet, Prn, Disp: , Rfl: 0    rosuvastatin (CRESTOR) 40 MG tablet, Take 40 mg by mouth daily, Disp: , Rfl: 0    vitamin B-12 (VITAMIN B-12) 1,000 mcg tablet, , Disp: , Rfl:     No Known Allergies    Vitals:    07/15/22 0917   BP: 132/90   Pulse: 71   Resp: 17   Temp: 98 2 °F (36 8 °C)   SpO2: 96%     Physical Exam  Constitutional:       Appearance: He is well-developed  HENT:      Head: Normocephalic and atraumatic        Right Ear: External ear normal       Left Ear: External ear normal    Eyes:      Conjunctiva/sclera: Conjunctivae normal  Pupils: Pupils are equal, round, and reactive to light  Cardiovascular:      Rate and Rhythm: Normal rate and regular rhythm  Heart sounds: Normal heart sounds  Pulmonary:      Effort: Pulmonary effort is normal       Breath sounds: Normal breath sounds  Comments: Clear bilaterally  Abdominal:      General: Bowel sounds are normal       Palpations: Abdomen is soft  Musculoskeletal:         General: Normal range of motion  Cervical back: Normal range of motion and neck supple  Skin:     General: Skin is warm  Comments: Good color, warm, moist, no petechiae or ecchymoses   Neurological:      Mental Status: He is alert and oriented to person, place, and time  Deep Tendon Reflexes: Reflexes are normal and symmetric  Psychiatric:         Behavior: Behavior normal          Thought Content:  Thought content normal          Judgment: Judgment normal      Extremities:  No lower extremity edema, no cords, pulses are 1+   Lymphatics:  No adenopathy in the neck, supraclavicular region, axilla bilaterally    Labs        11/22/2021 BUN = 21 creatinine = 1 01 calcium = 8 9 LFTs WNL total protein = 7 2    Imaging    No recent imaging for review    Pathology    No recent pathology results for review

## 2022-10-11 PROBLEM — Z00.00 MEDICARE ANNUAL WELLNESS VISIT, SUBSEQUENT: Status: RESOLVED | Noted: 2022-02-17 | Resolved: 2022-10-11

## 2022-11-22 ENCOUNTER — APPOINTMENT (OUTPATIENT)
Dept: LAB | Facility: CLINIC | Age: 67
End: 2022-11-22

## 2022-11-29 ENCOUNTER — VBI (OUTPATIENT)
Dept: ADMINISTRATIVE | Facility: OTHER | Age: 67
End: 2022-11-29

## 2023-02-08 ENCOUNTER — CONSULT (OUTPATIENT)
Dept: CARDIOLOGY CLINIC | Facility: CLINIC | Age: 68
End: 2023-02-08

## 2023-02-08 VITALS
OXYGEN SATURATION: 98 % | BODY MASS INDEX: 29.47 KG/M2 | HEIGHT: 69 IN | HEART RATE: 72 BPM | WEIGHT: 199 LBS | DIASTOLIC BLOOD PRESSURE: 72 MMHG | SYSTOLIC BLOOD PRESSURE: 120 MMHG

## 2023-02-08 DIAGNOSIS — E78.5 HYPERLIPIDEMIA, UNSPECIFIED HYPERLIPIDEMIA TYPE: ICD-10-CM

## 2023-02-08 DIAGNOSIS — Z95.1 HX OF CABG: ICD-10-CM

## 2023-02-08 DIAGNOSIS — Z95.5 H/O HEART ARTERY STENT: Primary | ICD-10-CM

## 2023-02-08 NOTE — PROGRESS NOTES
Consultation - Cardiology Office  [facility]  Mikaela Medellin 75 y o  male MRN: [id number]  : 1955  Unit/Bed#:  Encounter: [id number]      ASSESSMENT:  CAD, s/p MI in , s/p PCI mLAD on 1999  S/p CABG 2009 x 3/4  On aspirin, Toprol-XL, and lisinopril    Denies any cardiac symptoms    Prediabetes  Hyperlipidemia  2022: LDL 45, , HDL 58, normal AST, ALT  On Crestor 40 mg and Zetia 10 mg    History of elevated hemoglobin/hematocrit    Cardiac tests outside [facility]    Nuclear stress test, 2019  Normal, EF 67%    Nuclear stress test, 2023:  Normal stress test    EKG, 2022:  Sinus rhythm, ? RBBB    Cardiac catheterization, 2019:  80% p and mRCA, 80% ostial LAD and 80% mLAD, 95% m CX, 60% OM1  > CABG    RECOMMENDATIONS:  Continue current cardiac medications including aspirin, Crestor, Zetia, Toprol and lisinopril  Low-salt and low-cholesterol diet  Regular cardiovascular exercise as tolerated  Obtain old records from patient's previous cardiologist            Thank you for your consultation  If you have any question please call me at [phone]      Primary Care Physician Requesting Consult: Idalmis Schuler DO      Reason for Consult / Principal Problem: Establishing cardiac care locally        HPI :     Mikaela Medellin is a 75y o  year old male who was referred by primary care doctor for establishing cardiac care locally  Patient was previously seeing Dr Katerina Razo in [place]  He has a history of MI, PCI and CABG  Currently he is asymptomatic and went skiing yesterday without any chest pain or dyspnea      REVIEW OF SYSTEMS:      Constitutional: Negative for activity change, appetite change, chills, fatigue, fever and unexpected weight change  HENT: Negative for congestion, sore throat and trouble swallowing  Eyes: Negative for discharge and redness     Respiratory: Negative for apnea, cough, chest tightness, shortness of breath and wheezing  Cardiovascular: Negative for chest pain, shortness of breath, palpitations and leg swelling  Gastrointestinal: Negative for abdominal distention, abdominal pain, anal bleeding, blood in stool, constipation, diarrhea, nausea and vomiting  Endocrine: Negative for polydipsia, polyphagia and polyuria  Genitourinary: Negative for difficulty urinating, dysuria, flank pain and hematuria  Musculoskeletal: Negative for arthralgias, myalgias and neck stiffness  Skin: Negative for pallor and rash  Allergic/Immunologic: Negative for environmental allergies  Neurological: Negative for dizziness, syncope, light-headedness, numbness and headaches  Hematological: Negative for adenopathy  Does not bruise/bleed easily  Psychiatric/Behavioral: Negative for confusion and hallucinations  The patient is not nervous/anxious        Historical Information   Past Medical History:   Diagnosis Date   • Heart problem      Past Surgical History:   Procedure Laterality Date   • ARTERIAL BYPASS SURGERY       Social History     Substance and Sexual Activity   Alcohol Use Yes     Social History     Substance and Sexual Activity   Drug Use Never     Social History     Tobacco Use   Smoking Status Never   Smokeless Tobacco Never     Family History:   Family History   Problem Relation Age of Onset   • Stroke Father    • Rectal cancer Father    • Alzheimer's disease Mother        Meds/Allergies     No Known Allergies    Current Outpatient Medications:   •  Ascorbic Acid (Vitamin C) 500 MG CAPS, , Disp: , Rfl:   •  aspirin (ECOTRIN LOW STRENGTH) 81 mg EC tablet, Take 81 mg by mouth daily, Disp: , Rfl:   •  Echinacea 500 MG CAPS, , Disp: , Rfl:   •  ezetimibe (ZETIA) 10 mg tablet, Take 10 mg by mouth daily, Disp: , Rfl: 0  •  Ginkgo Biloba 120 MG CAPS, , Disp: , Rfl:   •  lisinopril (ZESTRIL) 5 mg tablet, Take 5 mg by mouth daily, Disp: , Rfl:   •  metoprolol succinate (TOPROL-XL) 50 mg 24 hr tablet, Take 1 tablet (50 mg total) by mouth daily, Disp: 90 tablet, Rfl: 0  •  Multiple Vitamins-Minerals (CENTRUM SILVER 50+MEN PO), , Disp: , Rfl:   •  nitroglycerin (NITROSTAT) 0 4 mg SL tablet, Prn, Disp: , Rfl: 0  •  Omega-3 Fatty Acids (OMEGA 3 PO), , Disp: , Rfl:   •  rosuvastatin (CRESTOR) 40 MG tablet, Take 40 mg by mouth daily, Disp: , Rfl: 0  •  vitamin B-12 (VITAMIN B-12) 1,000 mcg tablet, , Disp: , Rfl:     Vitals: Blood pressure 120/72, pulse 72, height 5' 9" (1 753 m), weight 90 3 kg (199 lb), SpO2 98 %  ?  Body mass index is 29 39 kg/m²  Vitals:    02/08/23 0919   Weight: 90 3 kg (199 lb)     BP Readings from Last 3 Encounters:   02/08/23 120/72   07/15/22 132/90   02/17/22 130/72       PHYSICAL EXAMINATION:  Neurologic:  Alert & oriented x 3, no new focal deficits, Not in any acute distress,  Constitutional:  Well developed, well nourished, non-toxic appearance   Eyes:  Pupil equal and reacting to light, conjunctiva normal, No JVP, No LNP   HENT:  Atraumatic, oropharynx moist, Neck- normal range of motion, no tenderness,  Neck supple   Respiratory:  Bilateral air entry, mostly clear to auscultation  Cardiovascular: S1-S2 regular with a I/VI systolic murmur   GI:  Soft, nondistended, normal bowel sounds, nontender, no hepatosplenomegaly appreciated  Musculoskeletal: no tenderness, no deformities  Skin:  Well hydrated, no rash   Lymphatic:  No lymphadenopathy noted   Extremities:  No edema and distal pulses are present    Diagnostic Studies Review Cardio:      EKG: Sinus rhythm with first-degree AV block, heart rate 72/min, left axis deviation, nonspecific IVCD    Cardiac testing:   No results found for this or any previous visit  Imaging:  Chest X-Ray:   No Chest XR results available for this patient  CT-scan of the chest:     No CTA results available for this patient    Lab Review   Lab Results   Component Value Date    WBC 6 73 11/22/2022    HGB 16 7 11/22/2022    HCT 47 1 11/22/2022    MCV 84 11/22/2022    RDW 13 2 11/22/2022     11/22/2022     BMP:  Lab Results   Component Value Date    SODIUM 139 11/22/2022    K 4 1 11/22/2022     11/22/2022    CO2 27 11/22/2022    BUN 22 11/22/2022    CREATININE 1 13 11/22/2022    GLUF 114 (H) 11/22/2022    CALCIUM 9 4 11/22/2022    EGFR 66 11/22/2022     LFT:  Lab Results   Component Value Date    AST 20 11/22/2022    ALT 36 11/22/2022    ALKPHOS 49 11/22/2022    TP 6 8 11/22/2022    ALB 3 8 11/22/2022      No results found for: [id number]  No components found for: TSH3  No results found for: HGBA1C  Lipid Profile:   Lab Results   Component Value Date    CHOLESTEROL 111 11/22/2022    HDL 43 11/22/2022    LDLCALC 39 11/22/2022    TRIG 146 11/22/2022     Lab Results   Component Value Date    CHOLESTEROL 111 11/22/2022    CHOLESTEROL 124 11/22/2021     No results found for: CKTOTAL, CKMB, CKMBINDEX, TROPONINI  No results found for: NTBNP   No results found for this or any previous visit (from the past 672 hour(s))  Dr Garfield Luevano MD, [facility] [place]      "This note has been constructed using a voice recognition system  Therefore there may be syntax, spelling, and/or grammatical errors   Please call if you have any questions  "

## 2023-02-09 ENCOUNTER — TELEPHONE (OUTPATIENT)
Dept: CARDIOLOGY CLINIC | Facility: CLINIC | Age: 68
End: 2023-02-09

## 2023-02-11 PROBLEM — E78.49 OTHER HYPERLIPIDEMIA: Status: ACTIVE | Noted: 2019-12-17

## 2023-02-11 PROBLEM — Z00.00 MEDICARE ANNUAL WELLNESS VISIT, SUBSEQUENT: Status: ACTIVE | Noted: 2023-02-11

## 2023-02-11 PROBLEM — Z12.11 COLON CANCER SCREENING: Status: ACTIVE | Noted: 2023-02-11

## 2023-02-13 ENCOUNTER — RA CDI HCC (OUTPATIENT)
Dept: OTHER | Facility: HOSPITAL | Age: 68
End: 2023-02-13

## 2023-02-13 NOTE — PROGRESS NOTES
[facility]  coding opportunities       Chart reviewed, no opportunity found:   [address]        Patients Insurance     Medicare Insurance: [employer] Advantage

## 2023-02-17 ENCOUNTER — OFFICE VISIT (OUTPATIENT)
Dept: FAMILY MEDICINE CLINIC | Facility: CLINIC | Age: 68
End: 2023-02-17

## 2023-02-17 VITALS
HEIGHT: 69 IN | RESPIRATION RATE: 16 BRPM | SYSTOLIC BLOOD PRESSURE: 132 MMHG | HEART RATE: 82 BPM | TEMPERATURE: 97.1 F | DIASTOLIC BLOOD PRESSURE: 80 MMHG | WEIGHT: 195.2 LBS | BODY MASS INDEX: 28.91 KG/M2

## 2023-02-17 DIAGNOSIS — E78.49 OTHER HYPERLIPIDEMIA: ICD-10-CM

## 2023-02-17 DIAGNOSIS — Z12.11 COLON CANCER SCREENING: ICD-10-CM

## 2023-02-17 DIAGNOSIS — E66.3 OVERWEIGHT (BMI 25.0-29.9): ICD-10-CM

## 2023-02-17 DIAGNOSIS — Z00.00 MEDICARE ANNUAL WELLNESS VISIT, SUBSEQUENT: Primary | ICD-10-CM

## 2023-02-17 DIAGNOSIS — Z12.5 PROSTATE CANCER SCREENING: ICD-10-CM

## 2023-02-17 DIAGNOSIS — R73.03 PREDIABETES: ICD-10-CM

## 2023-02-17 DIAGNOSIS — I25.10 CORONARY ARTERY DISEASE INVOLVING NATIVE CORONARY ARTERY OF NATIVE HEART WITHOUT ANGINA PECTORIS: ICD-10-CM

## 2023-02-17 DIAGNOSIS — D58.2 ELEVATED HEMOGLOBIN (HCC): ICD-10-CM

## 2023-02-17 PROBLEM — R89.9 ABNORMAL LABORATORY TEST: Status: RESOLVED | Noted: 2022-02-17 | Resolved: 2023-02-17

## 2023-02-17 RX ORDER — METOPROLOL SUCCINATE 50 MG/1
50 TABLET, EXTENDED RELEASE ORAL DAILY
Qty: 90 TABLET | Refills: 1 | Status: SHIPPED | OUTPATIENT
Start: 2023-02-17

## 2023-02-17 RX ORDER — LISINOPRIL 5 MG/1
5 TABLET ORAL DAILY
Qty: 90 TABLET | Refills: 1 | Status: SHIPPED | OUTPATIENT
Start: 2023-02-17

## 2023-02-17 RX ORDER — EZETIMIBE 10 MG/1
10 TABLET ORAL DAILY
Qty: 90 TABLET | Refills: 1 | Status: SHIPPED | OUTPATIENT
Start: 2023-02-17

## 2023-02-17 RX ORDER — ROSUVASTATIN CALCIUM 40 MG/1
40 TABLET, COATED ORAL DAILY
Qty: 90 TABLET | Refills: 1 | Status: SHIPPED | OUTPATIENT
Start: 2023-02-17

## 2023-02-17 NOTE — PROGRESS NOTES
Assessment/Plan:    No problem-specific Assessment & Plan notes found for this encounter  CAD stable, f/u cardio prn  I will refill his bp and chol meds and monitor  Continue cardiology f/u for stress testing etc     psa yearly, suggest with next labs in august    Colonoscopy due as suggested 7 years after 2015 per report, pt aware  Local recommendation given    Does not have htn per pt as dx, will monitor    hgb elevated but more recently normal, hematology cleared for prn f/u  Will do cbc yearly since on baby asa    bmi aware  Asked him to start a more intense exercise program, 3-5x/w, core and cardio    Prediabetes aware  Watch carb/calories     Diagnoses and all orders for this visit:    Medicare annual wellness visit, subsequent    Colon cancer screening  -     Ambulatory referral for colonoscopy; Future    Coronary artery disease involving native coronary artery of native heart without angina pectoris  -     Comprehensive metabolic panel; Future  -     lisinopril (ZESTRIL) 5 mg tablet; Take 1 tablet (5 mg total) by mouth daily    Elevated hemoglobin (HCC)    Prediabetes  -     Comprehensive metabolic panel; Future  -     Hemoglobin A1C; Future    Prostate cancer screening  -     PSA, Total Screen; Future  -     metoprolol succinate (TOPROL-XL) 50 mg 24 hr tablet; Take 1 tablet (50 mg total) by mouth daily    Other hyperlipidemia  -     rosuvastatin (CRESTOR) 40 MG tablet; Take 1 tablet (40 mg total) by mouth daily  -     ezetimibe (ZETIA) 10 mg tablet; Take 1 tablet (10 mg total) by mouth daily    BMI 28 0-28 9,adult    Overweight (BMI 25 0-29  9)        Return in about 6 months (around 8/17/2023) for Recheck  Subjective:      Patient ID: Bisi Garrison is a 75 y o  male      Chief Complaint   Patient presents with   • Medicare Wellness Visit     Nm lpn       HPI   cc is f/u htn/chol/cad/hgb    Saw local cardiologist Dr Diane Ramos  Does not plan to back to [facility] cardiologist anymore  Tolerating medications  Last LDL 39    The following portions of the patient's history were reviewed and updated as appropriate: allergies, current medications, past family history, past medical history, past social history, past surgical history and problem list     Review of Systems   Constitutional: Negative for chills and fever  Respiratory: Negative for shortness of breath  Cardiovascular: Negative for chest pain  Current Outpatient Medications   Medication Sig Dispense Refill   • Ascorbic Acid (Vitamin C) 500 MG CAPS      • aspirin (ECOTRIN LOW STRENGTH) 81 mg EC tablet Take 81 mg by mouth daily     • Echinacea 500 MG CAPS      • ezetimibe (ZETIA) 10 mg tablet Take 1 tablet (10 mg total) by mouth daily 90 tablet 1   • Ginkgo Biloba 120 MG CAPS      • lisinopril (ZESTRIL) 5 mg tablet Take 1 tablet (5 mg total) by mouth daily 90 tablet 1   • metoprolol succinate (TOPROL-XL) 50 mg 24 hr tablet Take 1 tablet (50 mg total) by mouth daily 90 tablet 1   • Multiple Vitamins-Minerals (CENTRUM SILVER 50+MEN PO)      • nitroglycerin (NITROSTAT) 0 4 mg SL tablet Prn  0   • Omega-3 Fatty Acids (OMEGA 3 PO)      • rosuvastatin (CRESTOR) 40 MG tablet Take 1 tablet (40 mg total) by mouth daily 90 tablet 1   • vitamin B-12 (VITAMIN B-12) 1,000 mcg tablet        No current facility-administered medications for this visit  Objective:    /80   Pulse 82   Temp (!) 97 1 °F (36 2 °C)   Resp 16   Ht 5' 9" (1 753 m)   Wt 88 5 kg (195 lb 3 2 oz)   BMI 28 83 kg/m²        Physical Exam  Vitals and nursing note reviewed  Constitutional:       General: He is not in acute distress  Appearance: He is well-developed  He is not ill-appearing  HENT:      Head: Normocephalic  Right Ear: Tympanic membrane and ear canal normal       Left Ear: Tympanic membrane and ear canal normal    Eyes:      General: No scleral icterus  Conjunctiva/sclera: Conjunctivae normal    Neck:      Vascular: No carotid bruit  Cardiovascular:      Rate and Rhythm: Normal rate and regular rhythm  Heart sounds: No murmur heard  Pulmonary:      Effort: Pulmonary effort is normal  No respiratory distress  Breath sounds: No wheezing  Abdominal:      General: There is no distension  Palpations: Abdomen is soft  Tenderness: There is no abdominal tenderness  Musculoskeletal:         General: No deformity  Cervical back: Neck supple  Skin:     General: Skin is warm and dry  Coloration: Skin is not pale  Neurological:      Mental Status: He is alert  Motor: No weakness  Gait: Gait normal    Psychiatric:         Mood and Affect: Mood normal          Behavior: Behavior normal          Thought Content: Thought content normal        BMI Counseling: Body mass index is 28 83 kg/m²  The BMI is above normal  Nutrition recommendations include decreasing portion sizes and moderation in carbohydrate intake  Exercise recommendations include exercising 3-5 times per week  No pharmacotherapy was ordered  Rationale for BMI follow-up plan is due to patient being overweight or obese  Depression Screening and Follow-up Plan: Patient was screened for depression during today's encounter  They screened negative with a PHQ-2 score of 0               Iliana Au DO

## 2023-02-18 NOTE — PROGRESS NOTES
Assessment and Plan:     Problem List Items Addressed This Visit        Active Problems    Coronary artery disease involving native coronary artery    Relevant Medications    lisinopril (ZESTRIL) 5 mg tablet    metoprolol succinate (TOPROL-XL) 50 mg 24 hr tablet    Other Relevant Orders    Comprehensive metabolic panel    Prediabetes    Relevant Orders    Comprehensive metabolic panel    Hemoglobin A1C    Prostate cancer screening    Relevant Medications    metoprolol succinate (TOPROL-XL) 50 mg 24 hr tablet    Other Relevant Orders    PSA, Total Screen    Elevated hemoglobin (HCC)    Other hyperlipidemia    Relevant Medications    rosuvastatin (CRESTOR) 40 MG tablet    ezetimibe (ZETIA) 10 mg tablet    Medicare annual wellness visit, subsequent - Primary    Colon cancer screening    Relevant Orders    Ambulatory referral for colonoscopy    BMI 28 0-28 9,adult    Overweight (BMI 25 0-29  9)        Preventive health issues were discussed with patient, and age appropriate screening tests were ordered as noted in patient's After Visit Summary  Personalized health advice and appropriate referrals for health education or preventive services given if needed, as noted in patient's After Visit Summary  History of Present Illness:     Patient presents for a Medicare Wellness Visit    HPI   Patient Care Team:  Leonardo Adams DO as PCP - General (Family Medicine)     Review of Systems:     Review of Systems     Problem List:     Patient Active Problem List   Diagnosis   • Coronary artery disease involving native coronary artery   • H/O heart artery stent   • Prediabetes   • Hx of CABG   • Other hyperlipidemia   • Prostate cancer screening   • Left inguinal hernia   • Immunization due   • Elevated hemoglobin ([address] )   • Medicare annual wellness visit, subsequent   • Colon cancer screening   • BMI 28 0-28 9,adult   • Overweight (BMI 25 0-29  9)      Past Medical and Surgical History:     Past Medical History:   Diagnosis Date   • Heart problem      Past Surgical History:   Procedure Laterality Date   • ARTERIAL BYPASS SURGERY        Family History:     Family History   Problem Relation Age of Onset   • Stroke Father    • Rectal cancer Father    • Alzheimer's disease Mother       Social History:     Social History     Socioeconomic History   • Marital status: Single     Spouse name: None   • Number of children: None   • Years of education: None   • Highest education level: None   Occupational History   • None   Tobacco Use   • Smoking status: Never   • Smokeless tobacco: Never   Vaping Use   • Vaping Use: Never used   Substance and Sexual Activity   • Alcohol use: Yes   • Drug use: Never   • Sexual activity: None   Other Topics Concern   • None   Social History Narrative   • None     Social Determinants of Health     Financial Resource Strain: Low Risk    • Difficulty of Paying Living Expenses: Not hard at all   Food Insecurity: Not on file   Transportation Needs: No Transportation Needs   • Lack of Transportation (Medical): No   • Lack of Transportation (Non-Medical):  No   Physical Activity: Not on file   Stress: Not on file   Social Connections: Not on file   Intimate Partner Violence: Not on file   Housing Stability: Not on file      Medications and Allergies:     Current Outpatient Medications   Medication Sig Dispense Refill   • Ascorbic Acid (Vitamin C) 500 MG CAPS      • aspirin (ECOTRIN LOW STRENGTH) 81 mg EC tablet Take 81 mg by mouth daily     • Echinacea 500 MG CAPS      • ezetimibe (ZETIA) 10 mg tablet Take 1 tablet (10 mg total) by mouth daily 90 tablet 1   • Ginkgo Biloba 120 MG CAPS      • lisinopril (ZESTRIL) 5 mg tablet Take 1 tablet (5 mg total) by mouth daily 90 tablet 1   • metoprolol succinate (TOPROL-XL) 50 mg 24 hr tablet Take 1 tablet (50 mg total) by mouth daily 90 tablet 1   • Multiple Vitamins-Minerals (CENTRUM SILVER 50+MEN PO)      • nitroglycerin (NITROSTAT) 0 4 mg SL tablet Prn  0   • Omega-3 Fatty Acids (OMEGA 3 PO)      • rosuvastatin (CRESTOR) 40 MG tablet Take 1 tablet (40 mg total) by mouth daily 90 tablet 1   • vitamin B-12 (VITAMIN B-12) 1,000 mcg tablet        No current facility-administered medications for this visit  No Known Allergies   Immunizations:     Immunization History   Administered Date(s) Administered   • COVID-19 MODERNA VACC 0 5 ML IM 01/14/2021, 02/22/2021, 10/28/2021   • INFLUENZA 10/15/2021   • Influenza, injectable, quadrivalent, preservative free 0 5 mL 10/25/2018   • Pneumococcal Conjugate 13-Valent 12/17/2019   • Pneumococcal Polysaccharide PPV23 02/17/2022   • Zoster Vaccine Recombinant 01/26/2023   • influenza, trivalent, adjuvanted 10/30/2019      Health Maintenance:         Topic Date Due   • Colorectal Cancer Screening  05/20/2022   • Hepatitis C Screening  Completed         Topic Date Due   • COVID-19 Vaccine (4 - Booster for Moderna series) 12/23/2021   • Influenza Vaccine (1) 09/01/2022      Medicare Screening Tests and Risk Assessments:     Ene Velazquez is here for his Subsequent Wellness visit  Last Medicare Wellness visit information reviewed, patient interviewed and updates made to the record today  Health Risk Assessment:   Patient rates overall health as very good  Patient feels that their physical health rating is same  Patient is very satisfied with their life  Eyesight was rated as same  Hearing was rated as same  Patient feels that their emotional and mental health rating is same  Patients states they are sometimes angry  Patient states they are never, rarely unusually tired/fatigued  Pain experienced in the last 7 days has been none  Patient states that he has experienced no weight loss or gain in last 6 months  Depression Screening:   PHQ-2 Score: 0      Fall Risk Screening: In the past year, patient has experienced: no history of falling in past year      Home Safety:  Patient does not have trouble with stairs inside or outside of their home   Patient has working smoke alarms and has working carbon monoxide detector  Home safety hazards include: none  Nutrition:   Current diet is Regular  Medications:   Patient is currently taking over-the-counter supplements  OTC medications include: see medication list  Patient is able to manage medications  Activities of Daily Living (ADLs)/Instrumental Activities of Daily Living (IADLs):   Walk and transfer into and out of bed and chair?: Yes  Dress and groom yourself?: Yes    Bathe or shower yourself?: Yes    Feed yourself? Yes  Do your laundry/housekeeping?: Yes  Manage your money, pay your bills and track your expenses?: Yes  Make your own meals?: Yes    Do your own shopping?: Yes    Previous Hospitalizations:   Any hospitalizations or ED visits within the last 12 months?: No      Advance Care Planning:   Living will: Yes    Durable POA for healthcare: Yes    Advanced directive: Yes    End of Life Decisions reviewed with patient: No      Cognitive Screening:   Provider or family/friend/caregiver concerned regarding cognition?: No    PREVENTIVE SCREENINGS      Cardiovascular Screening:    General: Screening Not Indicated and History Lipid Disorder      Diabetes Screening:     General: Screening Current      Colorectal Cancer Screening:     General: Screening Current      Prostate Cancer Screening:    General: Screening Current      Osteoporosis Screening:    General: Screening Not Indicated      Abdominal Aortic Aneurysm (AAA) Screening:    Risk factors include: age between 73-67 yo        General: Screening Not Indicated      Lung Cancer Screening:     General: Screening Not Indicated      Hepatitis C Screening:    General: Screening Current    Screening, Brief Intervention, and Referral to Treatment (SBIRT)    Screening  Typical number of drinks in a day: 2  Typical number of drinks in a week: 2  Interpretation: Low risk drinking behavior      Single Item Drug Screening:  How often have you used an illegal drug (including marijuana) or a prescription medication for non-medical reasons in the past year? never    Single Item Drug Screen Score: 0  Interpretation: Negative screen for possible drug use disorder    Other Counseling Topics:   Car/seat belt/driving safety and regular weightbearing exercise  No results found       Physical Exam:     /80   Pulse 82   Temp (!) 97 1 °F (36 2 °C)   Resp 16   Ht 5' 9" (1 753 m)   Wt 88 5 kg (195 lb 3 2 oz)   BMI 28 83 kg/m²     Physical Exam     Bairon Valadez DO

## 2023-02-18 NOTE — PATIENT INSTRUCTIONS
Medicare Preventive Visit Patient Instructions  Thank you for completing your Welcome to Medicare Visit or Medicare Annual Wellness Visit today  Your next wellness visit will be due in one year (2/18/2024)  The screening/preventive services that you may require over the next 5-10 years are detailed below  Some tests may not apply to you based off risk factors and/or age  Screening tests ordered at today's visit but not completed yet may show as past due  Also, please note that scanned in results may not display below  Preventive Screenings:  Service Recommendations Previous Testing/Comments   Colorectal Cancer Screening  · Colonoscopy    · Fecal Occult Blood Test (FOBT)/Fecal Immunochemical Test (FIT)  · Fecal DNA/Cologuard Test  · Flexible Sigmoidoscopy Age: 39-70 years old   Colonoscopy: every 10 years (May be performed more frequently if at higher risk)  OR  FOBT/FIT: every 1 year  OR  Cologuard: every 3 years  OR  Sigmoidoscopy: every 5 years  Screening may be recommended earlier than age 39 if at higher risk for colorectal cancer  Also, an individualized decision between you and your healthcare provider will decide whether screening between the ages of 74-80 would be appropriate   Colonoscopy: 05/20/2015  FOBT/FIT: Not on file  Cologuard: Not on file  Sigmoidoscopy: Not on file          Prostate Cancer Screening Individualized decision between patient and health care provider in men between ages of 53-78   Medicare will cover every 12 months beginning on the day after your 50th birthday PSA: 0 7 ng/mL           Hepatitis C Screening Once for adults born between 1945 and 1965  More frequently in patients at high risk for Hepatitis C Hep C Antibody: 09/21/2021        Diabetes Screening 1-2 times per year if you're at risk for diabetes or have pre-diabetes Fasting glucose: 114 mg/dL (11/22/2022)  A1C: No results in last 5 years (No results in last 5 years)      Cholesterol Screening Once every 5 years if you don't have a lipid disorder  May order more often based on risk factors  Lipid panel: 11/22/2022         Other Preventive Screenings Covered by Medicare:  1  Abdominal Aortic Aneurysm (AAA) Screening: covered once if your at risk  You're considered to be at risk if you have a family history of AAA or a male between the age of 73-68 who smoking at least 100 cigarettes in your lifetime  2  Lung Cancer Screening: covers low dose CT scan once per year if you meet all of the following conditions: (1) Age 50-69; (2) No signs or symptoms of lung cancer; (3) Current smoker or have quit smoking within the last 15 years; (4) You have a tobacco smoking history of at least 20 pack years (packs per day x number of years you smoked); (5) You get a written order from a healthcare provider  3  Glaucoma Screening: covered annually if you're considered high risk: (1) You have diabetes OR (2) Family history of glaucoma OR (3)  aged 48 and older OR (3)  American aged 72 and older  3  Osteoporosis Screening: covered every 2 years if you meet one of the following conditions: (1) Have a vertebral abnormality; (2) On glucocorticoid therapy for more than 3 months; (3) Have primary hyperparathyroidism; (4) On osteoporosis medications and need to assess response to drug therapy  5  HIV Screening: covered annually if you're between the age of 12-76  Also covered annually if you are younger than 13 and older than 72 with risk factors for HIV infection  For pregnant patients, it is covered up to 3 times per pregnancy      Immunizations:  Immunization Recommendations   Influenza Vaccine Annual influenza vaccination during flu season is recommended for all persons aged >= 6 months who do not have contraindications   Pneumococcal Vaccine   * Pneumococcal conjugate vaccine = PCV13 (Prevnar 13), PCV15 (Vaxneuvance), PCV20 (Prevnar 20)  * Pneumococcal polysaccharide vaccine = PPSV23 (Pneumovax) Adults 25-60 years old: 1-3 doses may be recommended based on certain risk factors  Adults 72 years old: 1-2 doses may be recommended based off what pneumonia vaccine you previously received   Hepatitis B Vaccine 3 dose series if at intermediate or high risk (ex: diabetes, end stage renal disease, liver disease)   Tetanus (Td) Vaccine - COST NOT COVERED BY MEDICARE PART B Following completion of primary series, a booster dose should be given every 10 years to maintain immunity against tetanus  Td may also be given as tetanus wound prophylaxis  Tdap Vaccine - COST NOT COVERED BY MEDICARE PART B Recommended at least once for all adults  For pregnant patients, recommended with each pregnancy  Shingles Vaccine (Shingrix) - COST NOT COVERED BY MEDICARE PART B  2 shot series recommended in those aged 48 and above     Health Maintenance Due:      Topic Date Due   • Colorectal Cancer Screening  05/20/2022   • Hepatitis C Screening  Completed     Immunizations Due:      Topic Date Due   • COVID-19 Vaccine (4 - Booster for Moderna series) 12/23/2021   • Influenza Vaccine (1) 09/01/2022     Advance Directives   What are advance directives? Advance directives are legal documents that state your wishes and plans for medical care  These plans are made ahead of time in case you lose your ability to make decisions for yourself  Advance directives can apply to any medical decision, such as the treatments you want, and if you want to donate organs  What are the types of advance directives? There are many types of advance directives, and each state has rules about how to use them  You may choose a combination of any of the following:  · Living will: This is a written record of the treatment you want  You can also choose which treatments you do not want, which to limit, and which to stop at a certain time  This includes surgery, medicine, IV fluid, and tube feedings  · Durable power of  for healthcare [facility]):   This is a written record that states who you want to make healthcare choices for you when you are unable to make them for yourself  This person, called a proxy, is usually a family member or a friend  You may choose more than 1 proxy  · Do not resuscitate (DNR) order:  A DNR order is used in case your heart stops beating or you stop breathing  It is a request not to have certain forms of treatment, such as CPR  A DNR order may be included in other types of advance directives  · Medical directive: This covers the care that you want if you are in a coma, near death, or unable to make decisions for yourself  You can list the treatments you want for each condition  Treatment may include pain medicine, surgery, blood transfusions, dialysis, IV or tube feedings, and a ventilator (breathing machine)  · Values history: This document has questions about your views, beliefs, and how you feel and think about life  This information can help others choose the care that you would choose  Why are advance directives important? An advance directive helps you control your care  Although spoken wishes may be used, it is better to have your wishes written down  Spoken wishes can be misunderstood, or not followed  Treatments may be given even if you do not want them  An advance directive may make it easier for your family to make difficult choices about your care  Weight Management   Why it is important to manage your weight:  Being overweight increases your risk of health conditions such as heart disease, high blood pressure, type 2 diabetes, and certain types of cancer  It can also increase your risk for osteoarthritis, sleep apnea, and other respiratory problems  Aim for a slow, steady weight loss  Even a small amount of weight loss can lower your risk of health problems  How to lose weight safely:  A safe and healthy way to lose weight is to eat fewer calories and get regular exercise   You can lose up about 1 pound a week by decreasing the number of calories you eat by 500 calories each day  Healthy meal plan for weight management:  A healthy meal plan includes a variety of foods, contains fewer calories, and helps you stay healthy  A healthy meal plan includes the following:  · Eat whole-grain foods more often  A healthy meal plan should contain fiber  Fiber is the part of grains, fruits, and vegetables that is not broken down by your body  Whole-grain foods are healthy and provide extra fiber in your diet  Some examples of whole-grain foods are whole-wheat breads and pastas, oatmeal, brown rice, and bulgur  · Eat a variety of vegetables every day  Include dark, leafy greens such as spinach, kale, [place] greens, and mustard greens  Eat yellow and orange vegetables such as carrots, sweet potatoes, and winter squash  · Eat a variety of fruits every day  Choose fresh or canned fruit (canned in its own juice or light syrup) instead of juice  Fruit juice has very little or no fiber  · Eat low-fat dairy foods  Drink fat-free (skim) milk or 1% milk  Eat fat-free yogurt and low-fat cottage cheese  Try low-fat cheeses such as mozzarella and other reduced-fat cheeses  · Choose meat and other protein foods that are low in fat  Choose beans or other legumes such as split peas or lentils  Choose fish, skinless poultry (chicken or turkey), or lean cuts of red meat (beef or pork)  Before you cook meat or poultry, cut off any visible fat  · Use less fat and oil  Try baking foods instead of frying them  Add less fat, such as margarine, sour cream, regular salad dressing and mayonnaise to foods  Eat fewer high-fat foods  Some examples of high-fat foods include french fries, doughnuts, ice cream, and cakes  · Eat fewer sweets  Limit foods and drinks that are high in sugar  This includes candy, cookies, regular soda, and sweetened drinks  Exercise:  Exercise at least 30 minutes per day on most days of the week   Some examples of exercise include walking, biking, dancing, and swimming  You can also fit in more physical activity by taking the stairs instead of the elevator or parking farther away from stores  Ask your healthcare provider about the best exercise plan for you  © Copyright [employer] 2018 Information is for End User's use only and may not be sold, redistributed or otherwise used for commercial purposes   All illustrations and images included in CareNotes® are the copyrighted property of [employer]  or [address] [place] [address]

## 2023-04-12 PROBLEM — Z00.00 MEDICARE ANNUAL WELLNESS VISIT, SUBSEQUENT: Status: RESOLVED | Noted: 2023-02-11 | Resolved: 2023-04-12

## 2023-04-12 PROBLEM — Z12.11 COLON CANCER SCREENING: Status: RESOLVED | Noted: 2023-02-11 | Resolved: 2023-04-12

## 2023-05-19 ENCOUNTER — OFFICE VISIT (OUTPATIENT)
Dept: GASTROENTEROLOGY | Facility: CLINIC | Age: 68
End: 2023-05-19

## 2023-05-19 VITALS
HEIGHT: 69 IN | HEART RATE: 78 BPM | BODY MASS INDEX: 28.76 KG/M2 | OXYGEN SATURATION: 100 % | WEIGHT: 194.2 LBS | RESPIRATION RATE: 18 BRPM | DIASTOLIC BLOOD PRESSURE: 80 MMHG | SYSTOLIC BLOOD PRESSURE: 130 MMHG

## 2023-05-19 DIAGNOSIS — Z80.0 FAMILY HISTORY OF COLON CANCER: ICD-10-CM

## 2023-05-19 DIAGNOSIS — Z12.11 COLON CANCER SCREENING: Primary | ICD-10-CM

## 2023-05-19 NOTE — LETTER
May 19, 2023     Cecilychristopher RissaDO  [facility] [address] [address]    Patient: Corinne South   YOB: 1955   Date of Visit: 5/19/2023       Dear Dr Estefania Berry: Thank you for referring Corinne South to me for evaluation  Below are my notes for this consultation  If you have questions, please do not hesitate to call me  I look forward to following your patient along with you  Sincerely,        Alda Noonan MD        CC: No Recipients  Alda Noonan MD  5/19/2023 12:42 PM  Sign when Signing Visit  Consultation - [facility]  Corinne South 75 y o  male MRN: [id number]          Assessment & Plan:  Alicia 50-year-old here for increased risk colon cancer screening, family history of colon cancer in his father  1   Colon cancer screening,  -Last examination 8 years ago  -We will schedule his colonoscopy  -Discussed with him risk of procedure including bleeding, surgery, perforation, missed polyp detection    Cherelle De Jesus was seen today for screening colonoscopy  Diagnoses and all orders for this visit:    Colon cancer screening  -     Colonoscopy; Future    Family history of colon cancer  -     Colonoscopy; Future    Other orders  -     Diet NPO; Sips with meds; Standing  -     Void on call to OR; Standing            _____________________________________________________________        CC: Screening colonoscopy screening colonoscopy  Alicia 50-year-old gentleman,    HPI:  Corinne South is a 75 y o male who was referred for evaluation of last colonoscopy 2015 was normal   Denies any significant GI complaint  Denies any nausea, vomiting, heartburn, dysphagia, diarrhea, constipation, melena, rectal bleeding  After seeing his PCP recently he started taking fiber supplementation which she reports actually does help with better BOWEL function  Denies any melena or rectal bleeding      Patient is otherwise medical history is notable for history of coronary disease status postcardiac stents and four-vessel CABG  Medical surgical history is also noted for tonsillectomy  Denies any tobacco, rarely drinks  He is retired previously worked as a   Family history is notable for rectal cancer in his father in his [de-identified]  ROS:  The remainder of the ROS was negative except for the pertinent positives mentioned in HPI  Allergies: Patient has no known allergies  Medications:   Current Outpatient Medications:   •  Ascorbic Acid (Vitamin C) 500 MG CAPS, , Disp: , Rfl:   •  aspirin (ECOTRIN LOW STRENGTH) 81 mg EC tablet, Take 81 mg by mouth daily, Disp: , Rfl:   •  Echinacea 500 MG CAPS, , Disp: , Rfl:   •  ezetimibe (ZETIA) 10 mg tablet, Take 1 tablet (10 mg total) by mouth daily, Disp: 90 tablet, Rfl: 1  •  Ginkgo Biloba 120 MG CAPS, , Disp: , Rfl:   •  lisinopril (ZESTRIL) 5 mg tablet, Take 1 tablet (5 mg total) by mouth daily, Disp: 90 tablet, Rfl: 1  •  metoprolol succinate (TOPROL-XL) 50 mg 24 hr tablet, Take 1 tablet (50 mg total) by mouth daily, Disp: 90 tablet, Rfl: 1  •  Multiple Vitamins-Minerals (CENTRUM SILVER 50+MEN PO), , Disp: , Rfl:   •  nitroglycerin (NITROSTAT) 0 4 mg SL tablet, Prn, Disp: , Rfl: 0  •  Omega-3 Fatty Acids (OMEGA 3 PO), , Disp: , Rfl:   •  rosuvastatin (CRESTOR) 40 MG tablet, Take 1 tablet (40 mg total) by mouth daily, Disp: 90 tablet, Rfl: 1  •  vitamin B-12 (VITAMIN B-12) 1,000 mcg tablet, , Disp: , Rfl: '    Past Medical History:   Diagnosis Date   • Heart problem        Past Surgical History:   Procedure Laterality Date   • ARTERIAL BYPASS SURGERY         Family History   Problem Relation Age of Onset   • Stroke Father    • Rectal cancer Father    • Alzheimer's disease Mother         reports that he has never smoked  He has never used smokeless tobacco  He reports current alcohol use  He reports that he does not use drugs            Physical Exam:     /80 (BP Location: Right arm, Patient "Position: Sitting, Cuff Size: Standard)   Pulse 78   Resp 18   Ht 5' 9\" (1 753 m)   Wt 88 1 kg (194 lb 3 2 oz)   SpO2 100%   BMI 28 68 kg/m²     Gen: wn/wd, NAD, healthy-appearing gentleman  HEENT: anicteric, MMM, no cervical LAD  CVS: RRR, no m/r/g  CHEST: CTA b/l  ABD: +BS, soft, NT,ND, no hepatosplenomegaly  EXT: no c/c/e  NEURO: aaox3  SKIN: NO rashes      "

## 2023-05-19 NOTE — PATIENT INSTRUCTIONS
Scheduled date of colonoscopy (as of today): 06/14/23  Physician performing colonoscopy: Cesario Jarrett  Location of colonoscopy: [place]  Bowel prep reviewed with patient: ERVIN BABCOCK [facility]  Instructions reviewed with patient by: VIR[place]  Clearances: NONE

## 2023-05-19 NOTE — PROGRESS NOTES
Consultation -  [facility]  Renu Sim 75 y o  male MRN: [id number]          Assessment & Plan:  Pleasant 41-year-old here for increased risk colon cancer screening, family history of colon cancer in his father  1   Colon cancer screening,  -Last examination 8 years ago  -We will schedule his colonoscopy  -Discussed with him risk of procedure including bleeding, surgery, perforation, missed polyp detection    Mt Evans was seen today for screening colonoscopy  Diagnoses and all orders for this visit:    Colon cancer screening  -     Colonoscopy; Future    Family history of colon cancer  -     Colonoscopy; Future    Other orders  -     Diet NPO; Sips with meds; Standing  -     Void on call to OR; Standing            _____________________________________________________________        CC: Screening colonoscopy screening colonoscopy  Alicia 41-year-old gentleman,    HPI:  Renu Sim is a 75 y o male who was referred for evaluation of last colonoscopy 2015 was normal   Denies any significant GI complaint  Denies any nausea, vomiting, heartburn, dysphagia, diarrhea, constipation, melena, rectal bleeding  After seeing his PCP recently he started taking fiber supplementation which she reports actually does help with better BOWEL function  Denies any melena or rectal bleeding  Patient is otherwise medical history is notable for history of coronary disease status postcardiac stents and four-vessel CABG  Medical surgical history is also noted for tonsillectomy  Denies any tobacco, rarely drinks  He is retired previously worked as a   Family history is notable for rectal cancer in his father in his [de-identified]  ROS:  The remainder of the ROS was negative except for the pertinent positives mentioned in HPI  Allergies: Patient has no known allergies      Medications:   Current Outpatient Medications:   •  Ascorbic Acid (Vitamin C) 500 MG CAPS, , Disp: , Rfl: "  •  aspirin (ECOTRIN LOW STRENGTH) 81 mg EC tablet, Take 81 mg by mouth daily, Disp: , Rfl:   •  Echinacea 500 MG CAPS, , Disp: , Rfl:   •  ezetimibe (ZETIA) 10 mg tablet, Take 1 tablet (10 mg total) by mouth daily, Disp: 90 tablet, Rfl: 1  •  Ginkgo Biloba 120 MG CAPS, , Disp: , Rfl:   •  lisinopril (ZESTRIL) 5 mg tablet, Take 1 tablet (5 mg total) by mouth daily, Disp: 90 tablet, Rfl: 1  •  metoprolol succinate (TOPROL-XL) 50 mg 24 hr tablet, Take 1 tablet (50 mg total) by mouth daily, Disp: 90 tablet, Rfl: 1  •  Multiple Vitamins-Minerals (CENTRUM SILVER 50+MEN PO), , Disp: , Rfl:   •  nitroglycerin (NITROSTAT) 0 4 mg SL tablet, Prn, Disp: , Rfl: 0  •  Omega-3 Fatty Acids (OMEGA 3 PO), , Disp: , Rfl:   •  rosuvastatin (CRESTOR) 40 MG tablet, Take 1 tablet (40 mg total) by mouth daily, Disp: 90 tablet, Rfl: 1  •  vitamin B-12 (VITAMIN B-12) 1,000 mcg tablet, , Disp: , Rfl: '    Past Medical History:   Diagnosis Date   • Heart problem        Past Surgical History:   Procedure Laterality Date   • ARTERIAL BYPASS SURGERY         Family History   Problem Relation Age of Onset   • Stroke Father    • Rectal cancer Father    • Alzheimer's disease Mother         reports that he has never smoked  He has never used smokeless tobacco  He reports current alcohol use  He reports that he does not use drugs            Physical Exam:     /80 (BP Location: Right arm, Patient Position: Sitting, Cuff Size: Standard)   Pulse 78   Resp 18   Ht 5' 9\" (1 753 m)   Wt 88 1 kg (194 lb 3 2 oz)   SpO2 100%   BMI 28 68 kg/m²     Gen: wn/wd, NAD, healthy-appearing gentleman  HEENT: anicteric, MMM, no cervical LAD  CVS: RRR, no m/r/g  CHEST: CTA b/l  ABD: +BS, soft, NT,ND, no hepatosplenomegaly  EXT: no c/c/e  NEURO: aaox3  SKIN: NO rashes    "

## 2023-06-14 ENCOUNTER — HOSPITAL ENCOUNTER (OUTPATIENT)
Dept: GASTROENTEROLOGY | Facility: AMBULARY SURGERY CENTER | Age: 68
Setting detail: OUTPATIENT SURGERY
Discharge: HOME | End: 2023-06-14
Attending: INTERNAL MEDICINE | Admitting: INTERNAL MEDICINE
Payer: COMMERCIAL

## 2023-06-14 ENCOUNTER — ANESTHESIA (OUTPATIENT)
Dept: GASTROENTEROLOGY | Facility: AMBULARY SURGERY CENTER | Age: 68
End: 2023-06-14

## 2023-06-14 ENCOUNTER — ANESTHESIA EVENT (OUTPATIENT)
Dept: GASTROENTEROLOGY | Facility: AMBULARY SURGERY CENTER | Age: 68
End: 2023-06-14

## 2023-06-14 VITALS
SYSTOLIC BLOOD PRESSURE: 122 MMHG | RESPIRATION RATE: 18 BRPM | DIASTOLIC BLOOD PRESSURE: 78 MMHG | HEART RATE: 68 BPM | OXYGEN SATURATION: 99 %

## 2023-06-14 DIAGNOSIS — Z80.0 FAMILY HISTORY OF COLON CANCER: ICD-10-CM

## 2023-06-14 DIAGNOSIS — Z12.11 COLON CANCER SCREENING: ICD-10-CM

## 2023-06-14 PROCEDURE — G0105 COLORECTAL SCRN; HI RISK IND: HCPCS | Performed by: INTERNAL MEDICINE

## 2023-06-14 RX ORDER — SODIUM CHLORIDE, SODIUM LACTATE, POTASSIUM CHLORIDE, CALCIUM CHLORIDE 600; 310; 30; 20 MG/100ML; MG/100ML; MG/100ML; MG/100ML
INJECTION, SOLUTION INTRAVENOUS CONTINUOUS PRN
Status: DISCONTINUED | OUTPATIENT
Start: 2023-06-14 | End: 2023-06-14

## 2023-06-14 RX ORDER — SODIUM CHLORIDE, SODIUM LACTATE, POTASSIUM CHLORIDE, CALCIUM CHLORIDE 600; 310; 30; 20 MG/100ML; MG/100ML; MG/100ML; MG/100ML
125 INJECTION, SOLUTION INTRAVENOUS CONTINUOUS
Status: CANCELLED | OUTPATIENT
Start: 2023-06-14

## 2023-06-14 RX ORDER — PROPOFOL 10 MG/ML
INJECTION, EMULSION INTRAVENOUS CONTINUOUS PRN
Status: DISCONTINUED | OUTPATIENT
Start: 2023-06-14 | End: 2023-06-14

## 2023-06-14 RX ORDER — LIDOCAINE HYDROCHLORIDE 10 MG/ML
INJECTION, SOLUTION EPIDURAL; INFILTRATION; INTRACAUDAL; PERINEURAL AS NEEDED
Status: DISCONTINUED | OUTPATIENT
Start: 2023-06-14 | End: 2023-06-14

## 2023-06-14 RX ORDER — PROPOFOL 10 MG/ML
INJECTION, EMULSION INTRAVENOUS AS NEEDED
Status: DISCONTINUED | OUTPATIENT
Start: 2023-06-14 | End: 2023-06-14

## 2023-06-14 RX ADMIN — SODIUM CHLORIDE, SODIUM LACTATE, POTASSIUM CHLORIDE, AND CALCIUM CHLORIDE: .6; .31; .03; .02 INJECTION, SOLUTION INTRAVENOUS at 11:35

## 2023-06-14 RX ADMIN — PROPOFOL 120 MCG/KG/MIN: 10 INJECTION, EMULSION INTRAVENOUS at 11:37

## 2023-06-14 RX ADMIN — PROPOFOL 100 MG: 10 INJECTION, EMULSION INTRAVENOUS at 11:37

## 2023-06-14 RX ADMIN — LIDOCAINE HYDROCHLORIDE 50 MG: 10 INJECTION, SOLUTION EPIDURAL; INFILTRATION; INTRACAUDAL; PERINEURAL at 11:37

## 2023-06-14 NOTE — ANESTHESIA POSTPROCEDURE EVALUATION
Post-Op Assessment Note    CV Status:  Stable  Pain Score: 0    Pain management: adequate     Mental Status:  Sleepy   Hydration Status:  Stable   PONV Controlled:  None   Airway Patency:  Patent      Post Op Vitals Reviewed: Yes      Staff: Anesthesiologist, CRNA         No notable events documented      BP   120/60   Temp      Pulse  70   Resp   14   SpO2   98

## 2023-06-14 NOTE — H&P
History and Physical -  [facility]  Justin Ch 75 y o  male MRN: [id number]    HPI: Justin Ch is a 75y o  year old male who presents with colon cancer screening, family hx of colon cancer  Review of Systems    Historical Information   Past Medical History:   Diagnosis Date   • Coronary artery disease    • Ischemia    • Migraine    • Myocardial infarction [facility]) 1999    one stent     Past Surgical History:   Procedure Laterality Date   • ANGIOPLASTY  1999    one stent   • ARTERIAL BYPASS SURGERY     • COLONOSCOPY  2015   • CORONARY ARTERY BYPASS GRAFT  2009    x4   • LASIK       Social History   Social History     Substance and Sexual Activity   Alcohol Use Yes    Comment: seldom     Social History     Substance and Sexual Activity   Drug Use Never     Social History     Tobacco Use   Smoking Status Never   Smokeless Tobacco Never     Family History   Problem Relation Age of Onset   • Alzheimer's disease Mother    • Other Mother         alzheimers   • Cancer Father         rectal   • Stroke Father    • Rectal cancer Father        Meds/Allergies     (Not in a hospital admission)      No Known Allergies    Objective     There were no vitals taken for this visit  PHYSICAL EXAM    Gen: NAD  CV: RRR  CHEST: Clear  ABD: soft, NT/ND  EXT: no edema  Neuro: AAO      ASSESSMENT/PLAN:  This is a 75y o  year old male here for colon cancer screening, family hx of colon cancer         PLAN:   Procedure: colonosocpy

## 2023-06-14 NOTE — ANESTHESIA PREPROCEDURE EVALUATION
Procedure:  COLONOSCOPY    Relevant Problems   ANESTHESIA (within normal limits)      CARDIO  Nuclear stress test, 1/11/2023:  Normal stress test     EKG, 12/6/2022:  Sinus rhythm, ? RBBB   (+) Coronary artery disease involving native coronary artery   (+) Myocardial infarction (HCC)   (+) Other hyperlipidemia      PULMONARY (within normal limits)      Other   (+) H/O heart artery stent   (+) Hx of CABG   (+) Prediabetes        Physical Exam    Airway    Mallampati score: II  TM Distance: >3 FB  Neck ROM: full     Dental   No notable dental hx     Cardiovascular      Pulmonary      Other Findings        Anesthesia Plan  ASA Score- 2     Anesthesia Type- IV sedation with anesthesia with ASA Monitors  Additional Monitors:   Airway Plan:           Plan Factors-Exercise tolerance (METS): >4 METS  Chart reviewed  EKG reviewed  Existing labs reviewed  Patient summary reviewed  Patient is not a current smoker  Induction-     Postoperative Plan-     Informed Consent- Anesthetic plan and risks discussed with patient  I personally reviewed this patient with the CRNA  Discussed and agreed on the Anesthesia Plan with the CRNA  Héctor Woods

## 2023-08-01 ENCOUNTER — APPOINTMENT (OUTPATIENT)
Dept: LAB | Facility: CLINIC | Age: 68
End: 2023-08-01
Payer: COMMERCIAL

## 2023-08-01 DIAGNOSIS — Z12.5 PROSTATE CANCER SCREENING: ICD-10-CM

## 2023-08-01 DIAGNOSIS — I25.10 CORONARY ARTERY DISEASE INVOLVING NATIVE CORONARY ARTERY OF NATIVE HEART WITHOUT ANGINA PECTORIS: ICD-10-CM

## 2023-08-01 DIAGNOSIS — R73.03 PREDIABETES: ICD-10-CM

## 2023-08-01 LAB
ALBUMIN SERPL BCP-MCNC: 3.9 G/DL (ref 3.5–5)
ALP SERPL-CCNC: 51 U/L (ref 46–116)
ALT SERPL W P-5'-P-CCNC: 43 U/L (ref 12–78)
ANION GAP SERPL CALCULATED.3IONS-SCNC: 2 MMOL/L
AST SERPL W P-5'-P-CCNC: 25 U/L (ref 5–45)
BILIRUB SERPL-MCNC: 0.94 MG/DL (ref 0.2–1)
BUN SERPL-MCNC: 20 MG/DL (ref 5–25)
CALCIUM SERPL-MCNC: 9.1 MG/DL (ref 8.3–10.1)
CHLORIDE SERPL-SCNC: 107 MMOL/L (ref 96–108)
CO2 SERPL-SCNC: 29 MMOL/L (ref 21–32)
CREAT SERPL-MCNC: 1.03 MG/DL (ref 0.6–1.3)
EST. AVERAGE GLUCOSE BLD GHB EST-MCNC: 108 MG/DL
GFR SERPL CREATININE-BSD FRML MDRD: 74 ML/MIN/1.73SQ M
GLUCOSE P FAST SERPL-MCNC: 106 MG/DL (ref 65–99)
HBA1C MFR BLD: 5.4 %
POTASSIUM SERPL-SCNC: 4.2 MMOL/L (ref 3.5–5.3)
PROT SERPL-MCNC: 7.2 G/DL (ref 6.4–8.4)
PSA SERPL-MCNC: 0.69 NG/ML (ref 0–4)
SODIUM SERPL-SCNC: 138 MMOL/L (ref 135–147)

## 2023-08-01 PROCEDURE — 83036 HEMOGLOBIN GLYCOSYLATED A1C: CPT

## 2023-08-01 PROCEDURE — 36415 COLL VENOUS BLD VENIPUNCTURE: CPT

## 2023-08-01 PROCEDURE — G0103 PSA SCREENING: HCPCS

## 2023-08-01 PROCEDURE — 80053 COMPREHEN METABOLIC PANEL: CPT

## 2023-08-15 NOTE — PROGRESS NOTES
Progress Note - Cardiology Office  [facility]    Flory Schrader 70 y.o. male MRN: [id number]  : 1955  Encounter: [id number]      ASSESSMENT:  CAD, s/p MI in , s/p PCI mLAD on 1999  S/p CABG 2009 x 3/4  On aspirin, Toprol-XL, and lisinopril     Denies any cardiac symptoms     Prediabetes  Hyperlipidemia  2022: LDL 45, , HDL 58, normal AST, ALT  On Crestor 40 mg and Zetia 10 mg     History of elevated hemoglobin/hematocrit     Cardiac tests outside [facility]     Nuclear stress test, 2019  Normal, EF 67%     Nuclear stress test, 2023:  Normal stress test     EKG, 2022:  Sinus rhythm, ? RBBB     Cardiac catheterization, 2019:  80% p and mRCA, 80% ostial LAD and 80% mLAD, 95% m CX, 60% OM1  > CABG     RECOMMENDATIONS:  Continue current cardiac medications including aspirin, Crestor, Zetia, Toprol and lisinopril  Repeat lipid profile and LFTs with PCP  Low-salt and low-cholesterol diet  Regular cardiovascular exercise as tolerated        Please call [phone] if any questions. HPI :     Flory Schrader is a 70y.o. year old male who came for follow up. He generally feels well and denies any symptoms. His blood pressure is well controlled. His last lipid profile 2022 was normal.  I advised him to get repeat lipid profile with his PCP at his next visit    REVIEW OF SYSTEMS:  Nuys any new or acute cardiac symptoms. Denies chest pain, dyspnea, palpitations or syncope.     Historical Information   Past Medical History:   Diagnosis Date   • Coronary artery disease    • Ischemia    • Migraine    • Myocardial infarction [facility])     one stent     Past Surgical History:   Procedure Laterality Date   • ANGIOPLASTY      one stent   • ARTERIAL BYPASS SURGERY     • COLONOSCOPY  2015   • CORONARY ARTERY BYPASS GRAFT  2009    x4   • LASIK       Social History     Substance and Sexual Activity   Alcohol Use Yes    Comment: seldom     Social History     Substance and Sexual Activity   Drug Use Never     Social History     Tobacco Use   Smoking Status Never   Smokeless Tobacco Never     Family History:   Family History   Problem Relation Age of Onset   • Alzheimer's disease Mother    • Other Mother         alzheimers   • Cancer Father         rectal   • Stroke Father    • Rectal cancer Father        Meds/Allergies     No Known Allergies    Current Outpatient Medications:   •  Ascorbic Acid (Vitamin C) 500 MG CAPS, once a week On Saturday, Disp: , Rfl:   •  aspirin (ECOTRIN LOW STRENGTH) 81 mg EC tablet, Take 81 mg by mouth every morning, Disp: , Rfl:   •  Echinacea 500 MG CAPS, once a week Saturday, Disp: , Rfl:   •  ezetimibe (ZETIA) 10 mg tablet, Take 1 tablet (10 mg total) by mouth daily, Disp: 90 tablet, Rfl: 1  •  Ginkgo Biloba 120 MG CAPS, once a week Saturday, Disp: , Rfl:   •  lisinopril (ZESTRIL) 5 mg tablet, Take 1 tablet (5 mg total) by mouth daily (Patient taking differently: Take 5 mg by mouth every morning), Disp: 90 tablet, Rfl: 1  •  metoprolol succinate (TOPROL-XL) 50 mg 24 hr tablet, Take 1 tablet (50 mg total) by mouth daily (Patient taking differently: Take 50 mg by mouth every morning), Disp: 90 tablet, Rfl: 1  •  Multiple Vitamins-Minerals (CENTRUM SILVER 50+MEN PO), every morning, Disp: , Rfl:   •  nitroglycerin (NITROSTAT) 0.4 mg SL tablet, Prn, Disp: , Rfl: 0  •  Omega-3 Fatty Acids (OMEGA 3 PO), once a week On Saturday, Disp: , Rfl:   •  Polyethylene Glycol 3350 (MIRALAX PO), Take by mouth 1 (one) time 238 gm with dulcolax, Disp: , Rfl:   •  rosuvastatin (CRESTOR) 40 MG tablet, Take 1 tablet (40 mg total) by mouth daily (Patient taking differently: Take 40 mg by mouth every morning), Disp: 90 tablet, Rfl: 1  •  vitamin B-12 (VITAMIN B-12) 1,000 mcg tablet, once a week On Saturday, Disp: , Rfl:     Vitals: There were no vitals taken for this visit. There is no height or weight on file to calculate BMI.   There were no vitals filed for this visit. BP Readings from Last 3 Encounters:   06/14/23 122/78   05/19/23 130/80   02/17/23 132/80       Physical Exam:  Physical Exam    Neurologic:  Alert & oriented x 3, no new focal deficits, Not in any acute distress,  Constitutional:  Well developed, well nourished, non-toxic appearance   Eyes:  Pupil equal and reacting to light, conjunctiva normal,   HENT:  Atraumatic, oropharynx moist, Neck- normal range of motion, no tenderness,  Neck supple, No JVP, No LNP   Respiratory:  Bilateral air entry,  mostly clear to auscultation  Cardiovascular: S1-S2 regular rhythm  GI:  Soft, nondistended, normal bowel sounds, nontender, no hepatosplenomegaly appreciated. Musculoskeletal:  no tenderness, no deformities. Skin:  Well hydrated, no rash   Lymphatic:  No lymphadenopathy noted   Extremities:  No edema         Diagnostic Studies Review Cardio:      EKG: Normal sinus rhythm, heart rate 87/min, IRBBB, inferior infarct of undetermined age    Cardiac testing:   No results found for this or any previous visit. Imaging:  Chest X-Ray:   No Chest XR results available for this patient. CT-scan of the chest:     No CTA results available for this patient.   Lab Review   Lab Results   Component Value Date    WBC 6.73 11/22/2022    HGB 16.7 11/22/2022    HCT 47.1 11/22/2022    MCV 84 11/22/2022    RDW 13.2 11/22/2022     11/22/2022     BMP:  Lab Results   Component Value Date    SODIUM 138 08/01/2023    K 4.2 08/01/2023     08/01/2023    CO2 29 08/01/2023    BUN 20 08/01/2023    CREATININE 1.03 08/01/2023    GLUF 106 (H) 08/01/2023    CALCIUM 9.1 08/01/2023    EGFR 74 08/01/2023     LFT:  Lab Results   Component Value Date    AST 25 08/01/2023    ALT 43 08/01/2023    ALKPHOS 51 08/01/2023    TP 7.2 08/01/2023    ALB 3.9 08/01/2023      No components found for: "TSH3"  No results found for: "[id number]"  Lab Results   Component Value Date    HGBA1C 5.4 08/01/2023     Lipid Profile:   Lab Results   Component Value Date    CHOLESTEROL 111 11/22/2022    HDL 43 11/22/2022    LDLCALC 39 11/22/2022    TRIG 146 11/22/2022     Lab Results   Component Value Date    CHOLESTEROL 111 11/22/2022    CHOLESTEROL 124 11/22/2021     No results found for: "CKTOTAL", "CKMB", "CKMBINDEX", "TROPONINI"  No results found for: "NTBNP"   Recent Results (from the past 672 hour(s))   Comprehensive metabolic panel    Collection Time: 08/01/23  8:49 AM   Result Value Ref Range    Sodium 138 135 - 147 mmol/L    Potassium 4.2 3.5 - 5.3 mmol/L    Chloride 107 96 - 108 mmol/L    CO2 29 21 - 32 mmol/L    ANION GAP 2 mmol/L    BUN 20 5 - 25 mg/dL    Creatinine 1.03 0.60 - 1.30 mg/dL    Glucose, Fasting 106 (H) 65 - 99 mg/dL    Calcium 9.1 8.3 - 10.1 mg/dL    AST 25 5 - 45 U/L    ALT 43 12 - 78 U/L    Alkaline Phosphatase 51 46 - 116 U/L    Total Protein 7.2 6.4 - 8.4 g/dL    Albumin 3.9 3.5 - 5.0 g/dL    Total Bilirubin 0.94 0.20 - 1.00 mg/dL    eGFR 74 ml/min/1.73sq m   Hemoglobin A1C    Collection Time: 08/01/23  8:49 AM   Result Value Ref Range    Hemoglobin A1C 5.4 Normal 4.0-5.6%; PreDiabetic 5.7-6.4%; Diabetic >=6.5%; Glycemic control for adults with diabetes <7.0% %     mg/dl   PSA, Total Screen    Collection Time: 08/01/23  8:49 AM   Result Value Ref Range    PSA 0.69 0.00 - 4.00 ng/mL             Dr. Marlen Ohara MD, [facility] [place]      "This note has been constructed using a voice recognition system. Therefore there may be syntax, spelling, and/or grammatical errors.  Please call if you have any questions. "

## 2023-08-16 ENCOUNTER — OFFICE VISIT (OUTPATIENT)
Dept: CARDIOLOGY CLINIC | Facility: CLINIC | Age: 68
End: 2023-08-16
Payer: COMMERCIAL

## 2023-08-16 VITALS
DIASTOLIC BLOOD PRESSURE: 82 MMHG | BODY MASS INDEX: 29.03 KG/M2 | WEIGHT: 196 LBS | HEART RATE: 87 BPM | SYSTOLIC BLOOD PRESSURE: 124 MMHG | OXYGEN SATURATION: 98 % | HEIGHT: 69 IN

## 2023-08-16 DIAGNOSIS — Z95.5 H/O HEART ARTERY STENT: ICD-10-CM

## 2023-08-16 DIAGNOSIS — I25.10 CORONARY ARTERY DISEASE INVOLVING NATIVE CORONARY ARTERY, UNSPECIFIED WHETHER ANGINA PRESENT, UNSPECIFIED WHETHER NATIVE OR TRANSPLANTED HEART: ICD-10-CM

## 2023-08-16 DIAGNOSIS — I21.9 MYOCARDIAL INFARCTION, UNSPECIFIED MI TYPE, UNSPECIFIED ARTERY (HCC): ICD-10-CM

## 2023-08-16 DIAGNOSIS — R73.03 PREDIABETES: ICD-10-CM

## 2023-08-16 DIAGNOSIS — E78.49 OTHER HYPERLIPIDEMIA: ICD-10-CM

## 2023-08-16 DIAGNOSIS — Z95.1 HX OF CABG: ICD-10-CM

## 2023-08-16 PROCEDURE — 99214 OFFICE O/P EST MOD 30 MIN: CPT | Performed by: INTERNAL MEDICINE

## 2023-08-16 PROCEDURE — 93000 ELECTROCARDIOGRAM COMPLETE: CPT | Performed by: INTERNAL MEDICINE

## 2023-08-20 DIAGNOSIS — I25.10 CORONARY ARTERY DISEASE INVOLVING NATIVE CORONARY ARTERY OF NATIVE HEART WITHOUT ANGINA PECTORIS: ICD-10-CM

## 2023-08-22 RX ORDER — LISINOPRIL 5 MG/1
5 TABLET ORAL DAILY
Qty: 90 TABLET | Refills: 1 | Status: SHIPPED | OUTPATIENT
Start: 2023-08-22

## 2023-08-23 ENCOUNTER — OFFICE VISIT (OUTPATIENT)
Dept: FAMILY MEDICINE CLINIC | Facility: CLINIC | Age: 68
End: 2023-08-23
Payer: COMMERCIAL

## 2023-08-23 VITALS
SYSTOLIC BLOOD PRESSURE: 128 MMHG | HEART RATE: 72 BPM | DIASTOLIC BLOOD PRESSURE: 80 MMHG | TEMPERATURE: 97.4 F | WEIGHT: 195.5 LBS | RESPIRATION RATE: 18 BRPM | BODY MASS INDEX: 28.96 KG/M2 | HEIGHT: 69 IN

## 2023-08-23 DIAGNOSIS — Z12.5 PROSTATE CANCER SCREENING: ICD-10-CM

## 2023-08-23 DIAGNOSIS — I25.10 CORONARY ARTERY DISEASE INVOLVING NATIVE CORONARY ARTERY OF NATIVE HEART WITHOUT ANGINA PECTORIS: Primary | ICD-10-CM

## 2023-08-23 DIAGNOSIS — I10 PRIMARY HYPERTENSION: ICD-10-CM

## 2023-08-23 DIAGNOSIS — R73.03 PREDIABETES: ICD-10-CM

## 2023-08-23 DIAGNOSIS — E66.3 OVERWEIGHT (BMI 25.0-29.9): ICD-10-CM

## 2023-08-23 DIAGNOSIS — E78.49 OTHER HYPERLIPIDEMIA: ICD-10-CM

## 2023-08-23 PROCEDURE — 99214 OFFICE O/P EST MOD 30 MIN: CPT | Performed by: FAMILY MEDICINE

## 2023-08-23 RX ORDER — METOPROLOL SUCCINATE 50 MG/1
50 TABLET, EXTENDED RELEASE ORAL DAILY
Qty: 90 TABLET | Refills: 1 | Status: SHIPPED | OUTPATIENT
Start: 2023-08-23

## 2023-08-23 NOTE — PROGRESS NOTES
Assessment/Plan:    No problem-specific Assessment & Plan notes found for this encounter. CAD stable, continue statin for risk reduction    HLD stable with LDL 39  Continue meds  Continue zetia    fbs prediabetes with normal a1c  Exercise/diet suggested    htn stable     Diagnoses and all orders for this visit:    Coronary artery disease involving native coronary artery of native heart without angina pectoris  -     Lipid Panel with Direct LDL reflex; Future    Other hyperlipidemia  -     Comprehensive metabolic panel; Future    Prediabetes  -     Comprehensive metabolic panel; Future  -     Hemoglobin A1C; Future    BMI 28.0-28.9,adult    Overweight (BMI 25.0-29. 9)    Primary hypertension  -     metoprolol succinate (TOPROL-XL) 50 mg 24 hr tablet; Take 1 tablet (50 mg total) by mouth daily    Prostate cancer screening      Return in about 6 months (around 2/23/2024) for Recheck. Subjective:      Patient ID: Osei Navas is a 70 y.o. male. Chief Complaint   Patient presents with   • Follow-up     Follow up lw vickey       HPI  Follows with local cardiologist now  Tolerating meds  LDL and labs reviewed  Feels good  Could lose some weight/inches    The following portions of the patient's history were reviewed and updated as appropriate: allergies, current medications, past family history, past medical history, past social history, past surgical history and problem list.    Review of Systems   Constitutional: Negative for chills and fever.          Current Outpatient Medications   Medication Sig Dispense Refill   • Ascorbic Acid (Vitamin C) 500 MG CAPS once a week On Saturday     • aspirin (ECOTRIN LOW STRENGTH) 81 mg EC tablet Take 81 mg by mouth every morning     • Echinacea 500 MG CAPS once a week Saturday     • ezetimibe (ZETIA) 10 mg tablet Take 1 tablet (10 mg total) by mouth daily 90 tablet 1   • Ginkgo Biloba 120 MG CAPS once a week Saturday     • lisinopril (ZESTRIL) 5 mg tablet take 1 tablet by mouth once daily 90 tablet 1   • metoprolol succinate (TOPROL-XL) 50 mg 24 hr tablet Take 1 tablet (50 mg total) by mouth daily 90 tablet 1   • Multiple Vitamins-Minerals (CENTRUM SILVER 50+MEN PO) every morning     • nitroglycerin (NITROSTAT) 0.4 mg SL tablet Prn  0   • Omega-3 Fatty Acids (OMEGA 3 PO) once a week On Saturday     • rosuvastatin (CRESTOR) 40 MG tablet Take 1 tablet (40 mg total) by mouth daily (Patient taking differently: Take 40 mg by mouth every morning) 90 tablet 1   • vitamin B-12 (VITAMIN B-12) 1,000 mcg tablet once a week On Saturday       No current facility-administered medications for this visit. Objective:    /80   Pulse 72   Temp (!) 97.4 °F (36.3 °C) (Tympanic)   Resp 18   Ht 5' 9" (1.753 m)   Wt 88.7 kg (195 lb 8 oz)   BMI 28.87 kg/m²        Physical Exam  Vitals and nursing note reviewed. Constitutional:       General: He is not in acute distress. Appearance: He is well-developed. He is not ill-appearing. HENT:      Head: Normocephalic. Right Ear: Tympanic membrane normal.      Left Ear: Tympanic membrane normal.   Eyes:      General: No scleral icterus. Conjunctiva/sclera: Conjunctivae normal.   Neck:      Vascular: No carotid bruit. Cardiovascular:      Rate and Rhythm: Normal rate and regular rhythm. Pulmonary:      Effort: Pulmonary effort is normal. No respiratory distress. Breath sounds: No wheezing. Abdominal:      General: There is no distension. Palpations: Abdomen is soft. Tenderness: There is no abdominal tenderness. Musculoskeletal:         General: No deformity. Cervical back: Neck supple. Right lower leg: No edema. Left lower leg: No edema. Skin:     General: Skin is warm and dry. Coloration: Skin is not pale. Neurological:      Mental Status: He is alert. Psychiatric:         Mood and Affect: Mood normal.         Behavior: Behavior normal.         Thought Content:  Thought content normal. Rhina Estrada, DO

## 2023-10-09 DIAGNOSIS — E78.49 OTHER HYPERLIPIDEMIA: ICD-10-CM

## 2023-10-09 RX ORDER — EZETIMIBE 10 MG/1
10 TABLET ORAL DAILY
Qty: 90 TABLET | Refills: 1 | Status: SHIPPED | OUTPATIENT
Start: 2023-10-09

## 2023-10-09 RX ORDER — ROSUVASTATIN CALCIUM 40 MG/1
40 TABLET, COATED ORAL EVERY MORNING
Qty: 90 TABLET | Refills: 1 | Status: SHIPPED | OUTPATIENT
Start: 2023-10-09

## 2024-01-01 DIAGNOSIS — I25.10 CORONARY ARTERY DISEASE INVOLVING NATIVE CORONARY ARTERY OF NATIVE HEART WITHOUT ANGINA PECTORIS: ICD-10-CM

## 2024-01-02 RX ORDER — LISINOPRIL 5 MG/1
5 TABLET ORAL DAILY
Qty: 90 TABLET | Refills: 1 | Status: SHIPPED | OUTPATIENT
Start: 2024-01-02

## 2024-02-16 ENCOUNTER — RA CDI HCC (OUTPATIENT)
Dept: OTHER | Facility: HOSPITAL | Age: 69
End: 2024-02-16

## 2024-02-21 PROBLEM — Z12.5 PROSTATE CANCER SCREENING: Status: RESOLVED | Noted: 2019-12-17 | Resolved: 2024-02-21

## 2024-02-23 ENCOUNTER — OFFICE VISIT (OUTPATIENT)
Dept: FAMILY MEDICINE CLINIC | Facility: CLINIC | Age: 69
End: 2024-02-23
Payer: COMMERCIAL

## 2024-02-23 VITALS
HEART RATE: 72 BPM | BODY MASS INDEX: 28.68 KG/M2 | WEIGHT: 193.6 LBS | TEMPERATURE: 95.1 F | SYSTOLIC BLOOD PRESSURE: 128 MMHG | RESPIRATION RATE: 20 BRPM | HEIGHT: 69 IN | DIASTOLIC BLOOD PRESSURE: 74 MMHG

## 2024-02-23 DIAGNOSIS — I10 PRIMARY HYPERTENSION: ICD-10-CM

## 2024-02-23 DIAGNOSIS — R73.03 PREDIABETES: ICD-10-CM

## 2024-02-23 DIAGNOSIS — Z00.00 MEDICARE ANNUAL WELLNESS VISIT, SUBSEQUENT: Primary | ICD-10-CM

## 2024-02-23 DIAGNOSIS — G43.109 COMPLICATED MIGRAINE: ICD-10-CM

## 2024-02-23 DIAGNOSIS — E78.49 OTHER HYPERLIPIDEMIA: ICD-10-CM

## 2024-02-23 DIAGNOSIS — Z12.5 PROSTATE CANCER SCREENING: ICD-10-CM

## 2024-02-23 PROCEDURE — G0439 PPPS, SUBSEQ VISIT: HCPCS | Performed by: FAMILY MEDICINE

## 2024-02-23 PROCEDURE — 99214 OFFICE O/P EST MOD 30 MIN: CPT | Performed by: FAMILY MEDICINE

## 2024-02-23 RX ORDER — EZETIMIBE 10 MG/1
10 TABLET ORAL DAILY
Qty: 90 TABLET | Refills: 1 | Status: SHIPPED | OUTPATIENT
Start: 2024-02-23

## 2024-02-23 RX ORDER — METOPROLOL SUCCINATE 50 MG/1
50 TABLET, EXTENDED RELEASE ORAL DAILY
Qty: 90 TABLET | Refills: 1 | Status: SHIPPED | OUTPATIENT
Start: 2024-02-23

## 2024-02-23 RX ORDER — ROSUVASTATIN CALCIUM 40 MG/1
40 TABLET, COATED ORAL EVERY MORNING
Qty: 90 TABLET | Refills: 1 | Status: SHIPPED | OUTPATIENT
Start: 2024-02-23

## 2024-02-23 NOTE — PROGRESS NOTES
Assessment/Plan:    No problem-specific Assessment & Plan notes found for this encounter.    Reports episodes of word finding difficulty moments with migraines, no dysarthria or other focal neurologic symptoms in extremities  MRI  Possible neurology eval    Htn/hld/prediabetes stable  Continue meds and diet efforts  Labs reordered  [place] recommendations discussed pending results and risks/benefits of statins       Diagnoses and all orders for this visit:    Medicare annual wellness visit, subsequent    Prostate cancer screening  -     PSA, Total Screen; Future    Complicated migraine  -     MRI brain w wo contrast; Future    Other hyperlipidemia  -     Lipid Panel with Direct LDL reflex; Future  -     ezetimibe (ZETIA) 10 mg tablet; Take 1 tablet (10 mg total) by mouth daily  -     rosuvastatin (CRESTOR) 40 MG tablet; Take 1 tablet (40 mg total) by mouth every morning    Primary hypertension  -     Comprehensive metabolic panel; Future  -     metoprolol succinate (TOPROL-XL) 50 mg 24 hr tablet; Take 1 tablet (50 mg total) by mouth daily    Prediabetes  -     Hemoglobin A1C; Future        No follow-ups on file.    Subjective:      Patient ID: Jordin Figueroa is a 68 y.o. male.    Chief Complaint   Patient presents with    Follow-up     YC       HPI  Walks daily  Exercises  Diet aware  Wt about same  Started metamucil    Since HS   Gets migraines and associated slurred speech before or after  lasting about 30min  no seizure or incoordination  Takes excedrin prn  Occurs about once every month or every other month  No fall or syncope or loss of postural tone    The following portions of the patient's history were reviewed and updated as appropriate: allergies, current medications, past family history, past medical history, past social history, past surgical history and problem list.    Review of Systems   Constitutional:  Negative for chills and fever.   Neurological:  Positive for speech difficulty and headaches.  "Negative for tremors, seizures, syncope and weakness.         Current Outpatient Medications   Medication Sig Dispense Refill    Ascorbic Acid (Vitamin C) 500 MG CAPS once a week On Saturday      aspirin (ECOTRIN LOW STRENGTH) 81 mg EC tablet Take 81 mg by mouth every morning      Echinacea 500 MG CAPS once a week Saturday      ezetimibe (ZETIA) 10 mg tablet Take 1 tablet (10 mg total) by mouth daily 90 tablet 1    Ginkgo Biloba 120 MG CAPS once a week Saturday      lisinopril (ZESTRIL) 5 mg tablet take 1 tablet by mouth once daily 90 tablet 1    metoprolol succinate (TOPROL-XL) 50 mg 24 hr tablet Take 1 tablet (50 mg total) by mouth daily 90 tablet 1    Multiple Vitamins-Minerals (CENTRUM SILVER 50+MEN PO) every morning      Omega-3 Fatty Acids (OMEGA 3 PO) once a week On Saturday      rosuvastatin (CRESTOR) 40 MG tablet Take 1 tablet (40 mg total) by mouth every morning 90 tablet 1    vitamin B-12 (VITAMIN B-12) 1,000 mcg tablet once a week On Saturday      nitroglycerin (NITROSTAT) 0.4 mg SL tablet Prn (Patient not taking: Reported on 2/23/2024)  0     No current facility-administered medications for this visit.       Objective:    /74   Pulse 72   Temp (!) 95.1 °F (35.1 °C) (Tympanic)   Resp 20   Ht 5' 9\" (1.753 m)   Wt 87.8 kg (193 lb 9.6 oz)   BMI 28.59 kg/m²        Physical Exam  Vitals and nursing note reviewed.   Constitutional:       General: He is not in acute distress.     Appearance: He is well-developed. He is not ill-appearing.   HENT:      Head: Normocephalic.      Right Ear: Tympanic membrane and ear canal normal.      Left Ear: Tympanic membrane and ear canal normal.   Eyes:      General: No scleral icterus.     Conjunctiva/sclera: Conjunctivae normal.   Neck:      Vascular: No carotid bruit.   Cardiovascular:      Rate and Rhythm: Normal rate and regular rhythm.   Pulmonary:      Effort: Pulmonary effort is normal. No respiratory distress.      Breath sounds: No wheezing.   Abdominal: "      General: There is no distension.      Palpations: Abdomen is soft.      Tenderness: There is no abdominal tenderness.   Musculoskeletal:         General: No deformity.      Cervical back: Neck supple.      Right lower leg: No edema.      Left lower leg: No edema.   Skin:     General: Skin is warm and dry.      Coloration: Skin is not pale.   Neurological:      General: No focal deficit present.      Mental Status: He is alert.      Motor: No weakness.      Gait: Gait normal.   Psychiatric:         Mood and Affect: Mood normal.         Behavior: Behavior normal.         Thought Content: Thought content normal.                Samir Jacome,

## 2024-02-24 PROBLEM — Z12.5 PROSTATE CANCER SCREENING: Status: ACTIVE | Noted: 2024-02-24

## 2024-02-24 PROBLEM — Z00.00 MEDICARE ANNUAL WELLNESS VISIT, SUBSEQUENT: Status: ACTIVE | Noted: 2024-02-24

## 2024-02-24 PROBLEM — G43.109 COMPLICATED MIGRAINE: Status: ACTIVE | Noted: 2024-02-24

## 2024-02-24 NOTE — PROGRESS NOTES
Assessment and Plan:     Problem List Items Addressed This Visit          Active Problems    Medicare annual wellness visit, subsequent - Primary    Complicated migraine    Relevant Medications    metoprolol succinate (TOPROL-XL) 50 mg 24 hr tablet    Other Relevant Orders    MRI brain w wo contrast    Prostate cancer screening    Relevant Orders    PSA, Total Screen    Primary hypertension    Relevant Medications    metoprolol succinate (TOPROL-XL) 50 mg 24 hr tablet    Other Relevant Orders    Comprehensive metabolic panel    Other hyperlipidemia    Relevant Medications    ezetimibe (ZETIA) 10 mg tablet    rosuvastatin (CRESTOR) 40 MG tablet    Other Relevant Orders    Lipid Panel with Direct LDL reflex    Prediabetes    Relevant Orders    Hemoglobin A1C        Preventive health issues were discussed with patient, and age appropriate screening tests were ordered as noted in patient's After Visit Summary.  Personalized health advice and appropriate referrals for health education or preventive services given if needed, as noted in patient's After Visit Summary.     History of Present Illness:     Patient presents for a Medicare Wellness Visit    HPI   Patient Care Team:  Samir Jacome DO as PCP - General (Family Medicine)     Review of Systems:     Review of Systems     Problem List:     Patient Active Problem List   Diagnosis    Coronary artery disease involving native coronary artery    H/O heart artery stent    Prediabetes    Hx of CABG    Other hyperlipidemia    Left inguinal hernia    Immunization due    Elevated hemoglobin (HCC)    BMI 28.0-28.9,adult    Overweight (BMI 25.0-29.9)    Family history of colon cancer    Primary hypertension    Medicare annual wellness visit, subsequent    Complicated migraine    Prostate cancer screening      Past Medical and Surgical History:     Past Medical History:   Diagnosis Date    Coronary artery disease     Ischemia     Migraine     Myocardial infarction (HCC) 1999     one stent     Past Surgical History:   Procedure Laterality Date    ANGIOPLASTY  1999    one stent    ARTERIAL BYPASS SURGERY      COLONOSCOPY  2015    CORONARY ARTERY BYPASS GRAFT  2009    x4    LASIK        Family History:     Family History   Problem Relation Age of Onset    Alzheimer's disease Mother     Other Mother         alzheimers    Cancer Father         rectal    Stroke Father     Rectal cancer Father       Social History:     Social History     Socioeconomic History    Marital status: Single     Spouse name: None    Number of children: None    Years of education: None    Highest education level: None   Occupational History    None   Tobacco Use    Smoking status: Never     Passive exposure: Past    Smokeless tobacco: Never   Vaping Use    Vaping status: Never Used   Substance and Sexual Activity    Alcohol use: Yes     Comment: seldom    Drug use: Never    Sexual activity: None   Other Topics Concern    None   Social History Narrative    None     Social Determinants of Health     Financial Resource Strain: Low Risk  (2/24/2024)    Overall Financial Resource Strain (CARDIA)     Difficulty of Paying Living Expenses: Not hard at all   Food Insecurity: Not on file   Transportation Needs: No Transportation Needs (2/24/2024)    PRAPARE - Transportation     Lack of Transportation (Medical): No     Lack of Transportation (Non-Medical): No   Physical Activity: Not on file   Stress: Not on file   Social Connections: Not on file   Intimate Partner Violence: Not on file   Housing Stability: Not on file      Medications and Allergies:     Current Outpatient Medications   Medication Sig Dispense Refill    Ascorbic Acid (Vitamin C) 500 MG CAPS once a week On Saturday      aspirin (ECOTRIN LOW STRENGTH) 81 mg EC tablet Take 81 mg by mouth every morning      Echinacea 500 MG CAPS once a week Saturday      ezetimibe (ZETIA) 10 mg tablet Take 1 tablet (10 mg total) by mouth daily 90 tablet 1    Ginkgo Biloba 120 MG CAPS  once a week Saturday      lisinopril (ZESTRIL) 5 mg tablet take 1 tablet by mouth once daily 90 tablet 1    metoprolol succinate (TOPROL-XL) 50 mg 24 hr tablet Take 1 tablet (50 mg total) by mouth daily 90 tablet 1    Multiple Vitamins-Minerals (CENTRUM SILVER 50+MEN PO) every morning      Omega-3 Fatty Acids (OMEGA 3 PO) once a week On Saturday      rosuvastatin (CRESTOR) 40 MG tablet Take 1 tablet (40 mg total) by mouth every morning 90 tablet 1    vitamin B-12 (VITAMIN B-12) 1,000 mcg tablet once a week On Saturday      nitroglycerin (NITROSTAT) 0.4 mg SL tablet Prn (Patient not taking: Reported on 2/23/2024)  0     No current facility-administered medications for this visit.     No Known Allergies   Immunizations:     Immunization History   Administered Date(s) Administered    COVID-19 MODERNA VACC 0.5 ML IM 01/14/2021, 02/22/2021, 10/28/2021, 07/21/2022, 11/03/2022    COVID-19 Moderna mRNA Vaccine 12 Yr+ 50 mcg/0.5 mL (Spikevax) 11/29/2023    INFLUENZA 10/15/2021    Influenza, injectable, quadrivalent, preservative free 0.5 mL 10/25/2018    Pneumococcal Conjugate 13-Valent 12/17/2019    Pneumococcal Polysaccharide PPV23 02/17/2022    Zoster Vaccine Recombinant 01/26/2023    influenza, trivalent, adjuvanted 10/30/2019      Health Maintenance:         Topic Date Due    Colorectal Cancer Screening  06/12/2028    Hepatitis C Screening  Completed         Topic Date Due    Influenza Vaccine (1) 09/01/2023    COVID-19 Vaccine (7 - 2023-24 season) 01/24/2024      Medicare Screening Tests and Risk Assessments:     Jordin is here for his Subsequent Wellness visit. Last Medicare Wellness visit information reviewed, patient interviewed and updates made to the record today.      Health Risk Assessment:   Patient rates overall health as good. Patient feels that their physical health rating is same. Patient is satisfied with their life. Eyesight was rated as slightly worse. Hearing was rated as same. Patient feels that their  emotional and mental health rating is same. Patients states they are never, rarely angry. Patient states they are sometimes unusually tired/fatigued. Pain experienced in the last 7 days has been none. Patient states that he has experienced no weight loss or gain in last 6 months.     Fall Risk Screening:   In the past year, patient has experienced: no history of falling in past year      Home Safety:  Patient does not have trouble with stairs inside or outside of their home. Patient has working smoke alarms and has working carbon monoxide detector. Home safety hazards include: none.     Nutrition:   Current diet is Regular.     Medications:   Patient is currently taking over-the-counter supplements. OTC medications include: see medication list. Patient is able to manage medications.     Activities of Daily Living (ADLs)/Instrumental Activities of Daily Living (IADLs):   Walk and transfer into and out of bed and chair?: Yes  Dress and groom yourself?: Yes    Bathe or shower yourself?: Yes    Feed yourself? Yes  Do your laundry/housekeeping?: Yes  Manage your money, pay your bills and track your expenses?: Yes  Make your own meals?: Yes    Do your own shopping?: Yes    Previous Hospitalizations:   Any hospitalizations or ED visits within the last 12 months?: No      Advance Care Planning:   Living will: Yes    Durable POA for healthcare: Yes    Advanced directive: Yes    End of Life Decisions reviewed with patient: No      Cognitive Screening:   Provider or family/friend/caregiver concerned regarding cognition?: No    PREVENTIVE SCREENINGS      Cardiovascular Screening:    General: Screening Not Indicated and History Lipid Disorder      Diabetes Screening:     General: Screening Not Indicated and History Diabetes      Colorectal Cancer Screening:     General: Screening Current      Prostate Cancer Screening:    General: History Prostate Cancer and Screening Not Indicated      Osteoporosis Screening:    General:  "Screening Not Indicated      Abdominal Aortic Aneurysm (AAA) Screening:    Risk factors include: age between 65-74 yo and tobacco use        General: Screening Not Indicated      Lung Cancer Screening:     General: Screening Not Indicated      Hepatitis C Screening:    General: Screening Current    Hep C Screening Accepted: No     Screening, Brief Intervention, and Referral to Treatment (SBIRT)    Screening  Typical number of drinks in a day: 1  Typical number of drinks in a week: 2  Interpretation: Low risk drinking behavior.    Single Item Drug Screening:  How often have you used an illegal drug (including marijuana) or a prescription medication for non-medical reasons in the past year? never    Single Item Drug Screen Score: 0  Interpretation: Negative screen for possible drug use disorder    Other Counseling Topics:   Car/seat belt/driving safety and regular weightbearing exercise.     No results found.     Physical Exam:     /74   Pulse 72   Temp (!) 95.1 °F (35.1 °C) (Tympanic)   Resp 20   Ht 5' 9\" (1.753 m)   Wt 87.8 kg (193 lb 9.6 oz)   BMI 28.59 kg/m²     Physical Exam     Samir Jacome,   "

## 2024-02-26 ENCOUNTER — APPOINTMENT (OUTPATIENT)
Dept: LAB | Facility: CLINIC | Age: 69
End: 2024-02-26
Payer: COMMERCIAL

## 2024-02-26 DIAGNOSIS — I25.10 CORONARY ARTERY DISEASE INVOLVING NATIVE CORONARY ARTERY OF NATIVE HEART WITHOUT ANGINA PECTORIS: ICD-10-CM

## 2024-02-26 DIAGNOSIS — R73.03 PREDIABETES: ICD-10-CM

## 2024-02-26 DIAGNOSIS — E78.49 OTHER HYPERLIPIDEMIA: ICD-10-CM

## 2024-02-26 LAB
ALBUMIN SERPL BCP-MCNC: 4.4 G/DL (ref 3.5–5)
ALP SERPL-CCNC: 47 U/L (ref 34–104)
ALT SERPL W P-5'-P-CCNC: 26 U/L (ref 7–52)
ANION GAP SERPL CALCULATED.3IONS-SCNC: 9 MMOL/L
AST SERPL W P-5'-P-CCNC: 22 U/L (ref 13–39)
BILIRUB SERPL-MCNC: 1.06 MG/DL (ref 0.2–1)
BUN SERPL-MCNC: 19 MG/DL (ref 5–25)
CALCIUM SERPL-MCNC: 9.3 MG/DL (ref 8.4–10.2)
CHLORIDE SERPL-SCNC: 101 MMOL/L (ref 96–108)
CHOLEST SERPL-MCNC: 119 MG/DL
CO2 SERPL-SCNC: 27 MMOL/L (ref 21–32)
CREAT SERPL-MCNC: 0.96 MG/DL (ref 0.6–1.3)
EST. AVERAGE GLUCOSE BLD GHB EST-MCNC: 103 MG/DL
GFR SERPL CREATININE-BSD FRML MDRD: 80 ML/MIN/1.73SQ M
GLUCOSE P FAST SERPL-MCNC: 107 MG/DL (ref 65–99)
HBA1C MFR BLD: 5.2 %
HDLC SERPL-MCNC: 46 MG/DL
LDLC SERPL CALC-MCNC: 44 MG/DL (ref 0–100)
POTASSIUM SERPL-SCNC: 4.4 MMOL/L (ref 3.5–5.3)
PROT SERPL-MCNC: 6.8 G/DL (ref 6.4–8.4)
SODIUM SERPL-SCNC: 137 MMOL/L (ref 135–147)
TRIGL SERPL-MCNC: 143 MG/DL

## 2024-02-26 PROCEDURE — 83036 HEMOGLOBIN GLYCOSYLATED A1C: CPT

## 2024-02-26 PROCEDURE — 36415 COLL VENOUS BLD VENIPUNCTURE: CPT

## 2024-02-26 PROCEDURE — 80053 COMPREHEN METABOLIC PANEL: CPT

## 2024-02-26 PROCEDURE — 80061 LIPID PANEL: CPT

## 2024-03-05 NOTE — PROGRESS NOTES
Progress Note - Cardiology Office  [facility]    Jordin Figueroa 68 y.o. male MRN: [id number]  : 1955  Encounter: [id number]      ASSESSMENT:  CAD, s/p MI in , s/p PCI mLAD on 1999  S/p CABG 2009 x 3/4  On aspirin, Crestor, Toprol-XL, and lisinopril     Prediabetes    Hyperlipidemia  2022: LDL 45, , HDL 58, normal AST, ALT  On Crestor 40 mg and Zetia 10 mg  2024: LDL 56, , HDL 46, normal AST and ALT    History of elevated hemoglobin/hematocrit     Cardiac tests outside [facility]     Nuclear stress test, 2019  Normal, EF 67%     Nuclear stress test, 2023:  Normal stress test     EKG, 2022:  Sinus rhythm, ?RBBB     Cardiac catheterization, 2019:  80% p and mRCA, 80% ostial LAD and 80% mLAD, 95% m CX, 60% OM1  > CABG     RECOMMENDATIONS:  Continue current cardiac medications including aspirin, Crestor, Zetia, fish oil, Toprol and lisinopril  Low-salt and low-cholesterol diet  Regular cardiovascular exercise as tolerated    Repeat echocardiogram after next office visit    Please call [phone] if any questions.    HPI :     Jordin Figueroa is a 68 y.o. year old male who came for follow up.  He states that he feels well and has no symptoms.  He is planning to go to [employer] [place] for skiing.  He is trying to lose 10 more pounds weight  We discussed his recent lipid profile which is in normal range    REVIEW OF SYSTEMS:  Denies any new or acute cardiac symptoms.  Denies chest pain, dyspnea, palpitations or syncope      Historical Information   Past Medical History:   Diagnosis Date    Coronary artery disease     Ischemia     Migraine     Myocardial infarction (HCC)     one stent     Past Surgical History:   Procedure Laterality Date    ANGIOPLASTY      one stent    ARTERIAL BYPASS SURGERY      COLONOSCOPY  2015    CORONARY ARTERY BYPASS GRAFT  2009    x4    LASIK       Social History     Substance and Sexual Activity   Alcohol  "Use Yes    Comment: seldom     Social History     Substance and Sexual Activity   Drug Use Never     Social History     Tobacco Use   Smoking Status Never    Passive exposure: Past   Smokeless Tobacco Never     Family History:   Family History   Problem Relation Age of Onset    Alzheimer's disease Mother     Other Mother         alzheimers    Cancer Father         rectal    Stroke Father     Rectal cancer Father        Meds/Allergies     No Known Allergies    Current Outpatient Medications:     Ascorbic Acid (Vitamin C) 500 MG CAPS, once a week On Saturday, Disp: , Rfl:     aspirin (ECOTRIN LOW STRENGTH) 81 mg EC tablet, Take 81 mg by mouth every morning, Disp: , Rfl:     Echinacea 500 MG CAPS, once a week Saturday, Disp: , Rfl:     ezetimibe (ZETIA) 10 mg tablet, Take 1 tablet (10 mg total) by mouth daily, Disp: 90 tablet, Rfl: 1    Ginkgo Biloba 120 MG CAPS, once a week Saturday, Disp: , Rfl:     lisinopril (ZESTRIL) 5 mg tablet, take 1 tablet by mouth once daily, Disp: 90 tablet, Rfl: 1    metoprolol succinate (TOPROL-XL) 50 mg 24 hr tablet, Take 1 tablet (50 mg total) by mouth daily, Disp: 90 tablet, Rfl: 1    Multiple Vitamins-Minerals (CENTRUM SILVER 50+MEN PO), every morning, Disp: , Rfl:     Omega-3 Fatty Acids (OMEGA 3 PO), once a week On Saturday, Disp: , Rfl:     rosuvastatin (CRESTOR) 40 MG tablet, Take 1 tablet (40 mg total) by mouth every morning, Disp: 90 tablet, Rfl: 1    vitamin B-12 (VITAMIN B-12) 1,000 mcg tablet, once a week On Saturday, Disp: , Rfl:     nitroglycerin (NITROSTAT) 0.4 mg SL tablet, Prn (Patient not taking: Reported on 2/23/2024), Disp: , Rfl: 0    Vitals: Blood pressure 124/90, pulse 87, height 5' 9\" (1.753 m), weight 88 kg (194 lb), SpO2 98%.    Body mass index is 28.65 kg/m².  Vitals:    03/06/24 0747   Weight: 88 kg (194 lb)     BP Readings from Last 3 Encounters:   03/06/24 124/90   02/23/24 128/74   08/23/23 128/80       Physical Exam:  Physical Exam    Neurologic:  Alert & " "oriented x 3, no new focal deficits, Not in any acute distress,  Constitutional: Overweight  Eyes:  Pupil equal and reacting to light, conjunctiva normal,   HENT:  Atraumatic, oropharynx moist, Neck- normal range of motion, no tenderness,  Neck supple, No JVP, No LNP   Respiratory:  Bilateral air entry, mostly clear to auscultation  Cardiovascular: S1-S2 regular with a I/VI  systolic murmur   GI:  Soft, nondistended, normal bowel sounds, nontender, no hepatosplenomegaly appreciated.  Musculoskeletal: no tenderness, no deformities.   Skin:  Well hydrated, no rash   Lymphatic:  No lymphadenopathy noted   Extremities:  No edema         Diagnostic Studies Review Cardio:      EKG: Normal sinus rhythm, heart rate 87/min.  Inferior infarct of undetermined age    Cardiac testing:   No results found for this or any previous visit.      Imaging:  Chest X-Ray:   No Chest XR results available for this patient.    CT-scan of the chest:     No CTA results available for this patient.  Lab Review   Lab Results   Component Value Date    WBC 6.73 11/22/2022    HGB 16.7 11/22/2022    HCT 47.1 11/22/2022    MCV 84 11/22/2022    RDW 13.2 11/22/2022     11/22/2022     BMP:  Lab Results   Component Value Date    SODIUM 137 02/26/2024    K 4.4 02/26/2024     02/26/2024    CO2 27 02/26/2024    BUN 19 02/26/2024    CREATININE 0.96 02/26/2024    GLUF 107 (H) 02/26/2024    CALCIUM 9.3 02/26/2024    EGFR 80 02/26/2024     LFT:  Lab Results   Component Value Date    AST 22 02/26/2024    ALT 26 02/26/2024    ALKPHOS 47 02/26/2024    TP 6.8 02/26/2024    ALB 4.4 02/26/2024      No components found for: \"TSH3\"  No results found for: \"[id number]\"  Lab Results   Component Value Date    HGBA1C 5.2 02/26/2024     Lipid Profile:   Lab Results   Component Value Date    CHOLESTEROL 119 02/26/2024    HDL 46 02/26/2024    LDLCALC 44 02/26/2024    TRIG 143 02/26/2024     Lab Results   Component Value Date    CHOLESTEROL 119 02/26/2024    " "CHOLESTEROL 111 11/22/2022     No results found for: \"CKTOTAL\", \"CKMB\", \"CKMBINDEX\", \"TROPONINI\"  No results found for: \"NTBNP\"   Recent Results (from the past 672 hour(s))   Comprehensive metabolic panel    Collection Time: 02/26/24  7:10 AM   Result Value Ref Range    Sodium 137 135 - 147 mmol/L    Potassium 4.4 3.5 - 5.3 mmol/L    Chloride 101 96 - 108 mmol/L    CO2 27 21 - 32 mmol/L    ANION GAP 9 mmol/L    BUN 19 5 - 25 mg/dL    Creatinine 0.96 0.60 - 1.30 mg/dL    Glucose, Fasting 107 (H) 65 - 99 mg/dL    Calcium 9.3 8.4 - 10.2 mg/dL    AST 22 13 - 39 U/L    ALT 26 7 - 52 U/L    Alkaline Phosphatase 47 34 - 104 U/L    Total Protein 6.8 6.4 - 8.4 g/dL    Albumin 4.4 3.5 - 5.0 g/dL    Total Bilirubin 1.06 (H) 0.20 - 1.00 mg/dL    eGFR 80 ml/min/1.73sq m   Lipid Panel with Direct LDL reflex    Collection Time: 02/26/24  7:10 AM   Result Value Ref Range    Cholesterol 119 See Comment mg/dL    Triglycerides 143 See Comment mg/dL    HDL, Direct 46 >=40 mg/dL    LDL Calculated 44 0 - 100 mg/dL   Hemoglobin A1C    Collection Time: 02/26/24  7:10 AM   Result Value Ref Range    Hemoglobin A1C 5.2 Normal 4.0-5.6%; PreDiabetic 5.7-6.4%; Diabetic >=6.5%; Glycemic control for adults with diabetes <7.0% %     mg/dl             Dr. John Jones MD, [facility]      \"This note has been constructed using a voice recognition system.Therefore there may be syntax, spelling, and/or grammatical errors. Please call if you have any questions. \"  "

## 2024-03-06 ENCOUNTER — TELEPHONE (OUTPATIENT)
Dept: CARDIOLOGY CLINIC | Facility: CLINIC | Age: 69
End: 2024-03-06

## 2024-03-06 ENCOUNTER — OFFICE VISIT (OUTPATIENT)
Dept: CARDIOLOGY CLINIC | Facility: CLINIC | Age: 69
End: 2024-03-06
Payer: COMMERCIAL

## 2024-03-06 VITALS
HEART RATE: 87 BPM | WEIGHT: 194 LBS | SYSTOLIC BLOOD PRESSURE: 124 MMHG | DIASTOLIC BLOOD PRESSURE: 90 MMHG | HEIGHT: 69 IN | OXYGEN SATURATION: 98 % | BODY MASS INDEX: 28.73 KG/M2

## 2024-03-06 DIAGNOSIS — E78.49 OTHER HYPERLIPIDEMIA: ICD-10-CM

## 2024-03-06 DIAGNOSIS — Z95.5 H/O HEART ARTERY STENT: ICD-10-CM

## 2024-03-06 DIAGNOSIS — R73.03 PREDIABETES: ICD-10-CM

## 2024-03-06 DIAGNOSIS — I25.10 CORONARY ARTERY DISEASE INVOLVING NATIVE CORONARY ARTERY, UNSPECIFIED WHETHER ANGINA PRESENT, UNSPECIFIED WHETHER NATIVE OR TRANSPLANTED HEART: Primary | ICD-10-CM

## 2024-03-06 DIAGNOSIS — Z95.1 HX OF CABG: ICD-10-CM

## 2024-03-06 PROCEDURE — 99214 OFFICE O/P EST MOD 30 MIN: CPT | Performed by: INTERNAL MEDICINE

## 2024-03-06 PROCEDURE — 93000 ELECTROCARDIOGRAM COMPLETE: CPT | Performed by: INTERNAL MEDICINE

## 2024-03-18 ENCOUNTER — HOSPITAL ENCOUNTER (OUTPATIENT)
Dept: RADIOLOGY | Facility: HOSPITAL | Age: 69
Discharge: HOME | End: 2024-03-18
Attending: FAMILY MEDICINE
Payer: COMMERCIAL

## 2024-03-18 DIAGNOSIS — G43.109 COMPLICATED MIGRAINE: ICD-10-CM

## 2024-03-18 PROCEDURE — 70553 MRI BRAIN STEM W/O & W/DYE: CPT

## 2024-03-18 PROCEDURE — A9585 GADOBUTROL INJECTION: HCPCS | Performed by: FAMILY MEDICINE

## 2024-03-18 RX ORDER — GADOBUTROL 604.72 MG/ML
9 INJECTION INTRAVENOUS
Status: COMPLETED | OUTPATIENT
Start: 2024-03-18 | End: 2024-03-18

## 2024-03-18 RX ADMIN — GADOBUTROL 9 ML: 604.72 INJECTION INTRAVENOUS at 08:14

## 2024-03-22 ENCOUNTER — TELEPHONE (OUTPATIENT)
Age: 69
End: 2024-03-22

## 2024-03-22 NOTE — TELEPHONE ENCOUNTER
Patient called to get results of MRI, advised results are still in process and will call when finalized and reviewed by doctor.

## 2024-04-24 PROBLEM — Z12.5 PROSTATE CANCER SCREENING: Status: RESOLVED | Noted: 2024-02-24 | Resolved: 2024-04-24

## 2024-04-24 PROBLEM — Z00.00 MEDICARE ANNUAL WELLNESS VISIT, SUBSEQUENT: Status: RESOLVED | Noted: 2024-02-24 | Resolved: 2024-04-24

## 2024-06-13 ENCOUNTER — OFFICE VISIT (OUTPATIENT)
Dept: DERMATOLOGY | Facility: CLINIC | Age: 69
End: 2024-06-13
Payer: COMMERCIAL

## 2024-06-13 VITALS — HEIGHT: 69 IN | WEIGHT: 191 LBS | BODY MASS INDEX: 28.29 KG/M2 | TEMPERATURE: 97.7 F

## 2024-06-13 DIAGNOSIS — L82.1 SEBORRHEIC KERATOSIS: ICD-10-CM

## 2024-06-13 DIAGNOSIS — D22.71 MULTIPLE BENIGN MELANOCYTIC NEVI OF UPPER AND LOWER EXTREMITIES AND TRUNK: Primary | ICD-10-CM

## 2024-06-13 DIAGNOSIS — L81.4 SOLAR LENTIGO: ICD-10-CM

## 2024-06-13 DIAGNOSIS — D18.01 CHERRY ANGIOMA: ICD-10-CM

## 2024-06-13 DIAGNOSIS — Z87.2 HISTORY OF ECZEMA: ICD-10-CM

## 2024-06-13 DIAGNOSIS — D22.72 MULTIPLE BENIGN MELANOCYTIC NEVI OF UPPER AND LOWER EXTREMITIES AND TRUNK: Primary | ICD-10-CM

## 2024-06-13 DIAGNOSIS — D22.61 MULTIPLE BENIGN MELANOCYTIC NEVI OF UPPER AND LOWER EXTREMITIES AND TRUNK: Primary | ICD-10-CM

## 2024-06-13 DIAGNOSIS — D22.5 MULTIPLE BENIGN MELANOCYTIC NEVI OF UPPER AND LOWER EXTREMITIES AND TRUNK: Primary | ICD-10-CM

## 2024-06-13 DIAGNOSIS — D22.62 MULTIPLE BENIGN MELANOCYTIC NEVI OF UPPER AND LOWER EXTREMITIES AND TRUNK: Primary | ICD-10-CM

## 2024-06-13 PROCEDURE — 99213 OFFICE O/P EST LOW 20 MIN: CPT

## 2024-06-13 NOTE — PATIENT INSTRUCTIONS
What is skin cancer?  Skin cancer is unfortunately very common. That's why we are here to help you on your journey to healthy happy skin! There are two main types of skin cancer: melanoma and non-melanoma skin cancer. Melanoma is a form of skin cancer that often arises within an existing nevus or mole. However, this is not always the case. Melanoma can arise anywhere (not only where you have moles right now). Melanoma can run in families, so letting us know about your family history is important. Non-melanoma skin cancer is the most common type of cancer in the [place]. The two main types of non-melanoma skin cancers are basal cell carcinomas (BCC) and squamous cell carcinoma (SCC). These cancers tend to be less aggressive than melanomas but are still important to look for and treat.    What can I do to prevent skin cancer?  One of the largest risk factors for skin cancer is sun exposure or UV radiation. Therefore, sun protection is lopez! Here are some great tips for protecting yourself!  Try to avoid direct sun exposure during peak sun hours (10 AM to 2 PM)  Remember you get A LOT of sun even under cloud coverage and through care windows!  When choosing a sunscreen, look for one that says “broad spectrum” sunscreen. This means it protects you from more of the harmful UV rays.   Choose a sunscreen that is SPF 30 or greater for best protection.   Apply sunscreen to all sun-exposed skin and reapply every 2 hours.   Consider sun protective clothing! Great additions to your sun protective clothing wardrobe include broad brimmed hats, sunglasses, UPF clothing.  Avoid tanning salons. These have been shown to be very harmful in terms of your risk of skin cancer.   Avoid “base tans”. We now know that tans are dangerous (not just sun burns). If you want to have a tan for a trip, consider a spray tan!    Should I check my skin at home between my dermatology appointments?  Yes! It's always a great idea to look at your  skin on a regular basis. Here are some things to look for when monitoring your skin.   For melanoma, look for the ABCDE's!  A = Asymmetry. Look for a spot where one half does not match the other!  B = Borders. Look for a spot that has jagged, ragged or irregular borders.  C = Color. Look for a spot that is not evenly colored and often includes multiple colors, especially true black, red, white, blue, grey.   D = Diameter. Look for a spot that is larger than the size of a pencil eraser.  E = Evolution. If you ever have a spot that is changing in shape, color, size or symptoms (becomes itchy, painful or starts to bleed), always call us!  For non-melanoma skin cancers, look for a new, pink spot that is not going away, especially one that is itchy, painful or bleeding.     What should I do if I see a spot that is concerning for melanoma or non-melanoma skin cancer?  If you are ever concerned, call us! Do not wait for your next appointment. We want to help!       ECZEMA     Assessment and Plan:  Based on a thorough discussion of this condition and the management approach to it (including a comprehensive discussion of the known risks, side effects and potential benefits of treatment), the patient (family) agrees to implement the following specific plan:      Advise gentle skin care as follows:   Shower with lukewarm water less than 10 minutes   Use Dove unscented soap to groin and armpits and neck  Pat dry after shower. Do not harshly rub.   Immediately moisturize with heavy emollient   BEST - OINTMENTS, such as Vaseline, Aquaphor, Cerave healing ointment        BETTER - CREAMS, such as Cerave, Cetaphil, VaniCREAM, Aveeno, Eucerin    AVOID LOTIONS, too thin, most things in pump  Moisturize twice a day.  [place] use of emollients as noted above are very important! At least daily (but prefer twice daily).       VILLA ANGIOMAS     Physical Exam:  Anatomic Location Affected:  Trunk and extremities  Morphological Description:  " Scattered cherry red papules  Denies pain, itch, bleeding. No treatments tried. Present for years. Present constantly; no modifying factors which make it worse or better.     Assessment and Plan:  Based on a thorough discussion of this condition and the management approach to it (including a comprehensive discussion of the known risks, side effects and potential benefits of treatment), the patient (family) agrees to implement the following specific plan:  Reassure benign        SEBORRHEIC KERATOSIS; NON-INFLAMED     Physical Exam:  Anatomic Location Affected:  Trunk and extremities  Morphological Description:  Waxy, smooth to warty textured, yellow to brownish-grey to dark brown to blackish, discrete, \"stuck-on\" appearing papules.  Present for years. Denies pain, itch, bleeding.      Additional History of Present Condition:  Present constantly; no modifying factors which make it worse or better. No prior treatment.       Assessment and Plan:  Based on a thorough discussion of this condition and the management approach to it (including a comprehensive discussion of the known risks, side effects and potential benefits of treatment), the patient (family) agrees to implement the following specific plan:  Reassure benign  Use sun protection.  Apply SPF 30 or higher at least three times a day.  Wear sun protecting clothing and hats.        SOLAR LENTIGINES   OTHER SKIN CHANGES DUE TO CHRONIC EXPOSURE TO NONIONIZING RADIATION     Physical Exam:  Anatomic Location Affected:  Sun exposed areas of back, chest, arms, legs  Morphological Description:  Multiple scattered brown to tan evenly pigmented macules   Denies pain, itch, bleeding. No treatments tried. Present for months - years. Reports getting newer lesions with sun exposure.         Assessment and Plan:  Based on a thorough discussion of this condition and the management approach to it (including a comprehensive discussion of the known risks, side effects and " "potential benefits of treatment), the patient (family) agrees to implement the following specific plan:  Reassure benign  Use sun protection.  Apply SPF 30 or higher at least three times a day.  Wear sun protecting clothing and hats.         MULTIPLE MELANOCYTIC NEVI (\"Moles\")     Physical Exam:  Anatomic Location Affected: Trunk and extremities  Morphological Description:  Scattered, round to ovoid, symmetrical-appearing, even bordered, skin colored to dark brown macules/papules  Denies pain, itch, bleeding. No treatments tried. Present for years. Present constantly; no modifying factors which make it worse or better. Denies actively changing or growing moles.      Assessment and Plan:  Based on a thorough discussion of this condition and the management approach to it (including a comprehensive discussion of the known risks, side effects and potential benefits of treatment), the patient (family) agrees to implement the following specific plan:  Reassure benign  Monitor for changes  Use sun protection.  Apply SPF 30 or higher at least three times a day.  Wear sun protecting clothing and hats.  "

## 2024-06-13 NOTE — PROGRESS NOTES
"[facility] Note     Patient Name: Jordin Figueroa  Encounter Date: 6/13/2024    Have you been cared for by a [facility] Dermatologist in the last 3 years and, if so, which description applies to you?    Yes.  I have been here within the last 3 years, and my medical history has NOT changed since that time.  I am MALE/not capable of bearing children.    REVIEW OF SYSTEMS:  Have you recently had or currently have any of the following? No changes in my recent health.   PAST MEDICAL HISTORY:  Have you personally ever had or currently have any of the following?  If \"YES,\" then please provide more detail. No changes in my medical history.   HISTORY OF IMMUNOSUPPRESSION: Do you have a history of any of the following:  Systemic Immunosuppression such as Diabetes, Biologic or Immunotherapy, Chemotherapy, Organ Transplantation, Bone Marrow Transplantation?  No     Answering \"YES\" requires the addition of the dotphrase \"IMMUNOSUPPRESSED\" as the first diagnosis of the patient's visit.   FAMILY HISTORY:  Any \"first degree relatives\" (parent, brother, sister, or child) with the following?    No changes in my family's known health.   PATIENT EXPERIENCE:    Do you want the Dermatologist to perform a COMPLETE skin exam today including a clinical examination under the \"bra and underwear\" areas?  Yes  If necessary, do we have your permission to call and leave a detailed message on your Preferred Phone number that includes your specific medical information?  Yes      No Known Allergies   Current Outpatient Medications:     Ascorbic Acid (Vitamin C) 500 MG CAPS, once a week On Saturday, Disp: , Rfl:     aspirin (ECOTRIN LOW STRENGTH) 81 mg EC tablet, Take 81 mg by mouth every morning, Disp: , Rfl:     Echinacea 500 MG CAPS, once a week Saturday, Disp: , Rfl:     ezetimibe (ZETIA) 10 mg tablet, Take 1 tablet (10 mg total) by mouth daily, Disp: 90 tablet, Rfl: 1    Ginkgo Biloba 120 MG CAPS, once a week Saturday, Disp: " , Rfl:     lisinopril (ZESTRIL) 5 mg tablet, take 1 tablet by mouth once daily, Disp: 90 tablet, Rfl: 1    metoprolol succinate (TOPROL-XL) 50 mg 24 hr tablet, Take 1 tablet (50 mg total) by mouth daily, Disp: 90 tablet, Rfl: 1    Multiple Vitamins-Minerals (CENTRUM SILVER 50+MEN PO), every morning, Disp: , Rfl:     nitroglycerin (NITROSTAT) 0.4 mg SL tablet, Prn (Patient not taking: Reported on 2/23/2024), Disp: , Rfl: 0    Omega-3 Fatty Acids (OMEGA 3 PO), once a week On Saturday, Disp: , Rfl:     rosuvastatin (CRESTOR) 40 MG tablet, Take 1 tablet (40 mg total) by mouth every morning, Disp: 90 tablet, Rfl: 1    vitamin B-12 (VITAMIN B-12) 1,000 mcg tablet, once a week On Saturday, Disp: , Rfl:           Whom besides the patient is providing clinical information about today's encounter?   NO ADDITIONAL HISTORIAN (patient alone provided history)    Physical Exam and Assessment/Plan by Diagnosis:    ECZEMATOUS DERMATITIS    Physical Exam:  Anatomic Location Affected:  Bilateral arms, trunk  Morphological Description:  scattered pink excoriated papules and patches  Pertinent Positives:  Pertinent Negatives:    Additional History of Present Condition:  He uses over the counter CeraVe cream for dry skin and eczema flares.     Assessment and Plan:  Based on a thorough discussion of this condition and the management approach to it (including a comprehensive discussion of the known risks, side effects and potential benefits of treatment), the patient (family) agrees to implement the following specific plan:      Advise gentle skin care as follows:   Shower with lukewarm water less than 10 minutes   Use Dove unscented soap to groin and armpits and neck  Pat dry after shower. Do not harshly rub.   Immediately moisturize with heavy emollient   BEST - OINTMENTS, such as Vaseline, Aquaphor, Cerave healing ointment        BETTER - CREAMS, such as Cerave, Cetaphil, VaniCREAM, Aveeno, Eucerin    AVOID LOTIONS, too thin, most  "things in pump  Moisturize twice a day.  [place] use of emollients as noted above are very important! At least daily (but prefer twice daily).     VILLA ANGIOMAS     Physical Exam:  Anatomic Location Affected:  Trunk and extremities  Morphological Description:  Scattered cherry red papules  Denies pain, itch, bleeding. No treatments tried. Present for years. Present constantly; no modifying factors which make it worse or better.     Assessment and Plan:  Based on a thorough discussion of this condition and the management approach to it (including a comprehensive discussion of the known risks, side effects and potential benefits of treatment), the patient (family) agrees to implement the following specific plan:  Reassure benign        SEBORRHEIC KERATOSIS; NON-INFLAMED     Physical Exam:  Anatomic Location Affected:  Trunk and extremities  Morphological Description:  Waxy, smooth to warty textured, yellow to brownish-grey to dark brown to blackish, discrete, \"stuck-on\" appearing papules.  Present for years. Denies pain, itch, bleeding.      Additional History of Present Condition:  Present constantly; no modifying factors which make it worse or better. No prior treatment.       Assessment and Plan:  Based on a thorough discussion of this condition and the management approach to it (including a comprehensive discussion of the known risks, side effects and potential benefits of treatment), the patient (family) agrees to implement the following specific plan:  Reassure benign  Use sun protection.  Apply SPF 30 or higher at least three times a day.  Wear sun protecting clothing and hats.        SOLAR LENTIGINES   OTHER SKIN CHANGES DUE TO CHRONIC EXPOSURE TO NONIONIZING RADIATION     Physical Exam:  Anatomic Location Affected:  Sun exposed areas of back, chest, arms, legs  Morphological Description:  Multiple scattered brown to tan evenly pigmented macules   Denies pain, itch, bleeding. No treatments tried. Present for " "months - years. Reports getting newer lesions with sun exposure.         Assessment and Plan:  Based on a thorough discussion of this condition and the management approach to it (including a comprehensive discussion of the known risks, side effects and potential benefits of treatment), the patient (family) agrees to implement the following specific plan:  Reassure benign  Use sun protection.  Apply SPF 30 or higher at least three times a day.  Wear sun protecting clothing and hats.         MULTIPLE MELANOCYTIC NEVI (\"Moles\")     Physical Exam:  Anatomic Location Affected: Trunk and extremities  Morphological Description:  Scattered, round to ovoid, symmetrical-appearing, even bordered, skin colored to dark brown macules/papules  Denies pain, itch, bleeding. No treatments tried. Present for years. Present constantly; no modifying factors which make it worse or better. Denies actively changing or growing moles.      Assessment and Plan:  Based on a thorough discussion of this condition and the management approach to it (including a comprehensive discussion of the known risks, side effects and potential benefits of treatment), the patient (family) agrees to implement the following specific plan:  Reassure benign  Monitor for changes  Use sun protection.  Apply SPF 30 or higher at least three times a day.  Wear sun protecting clothing and hats.    Scribe Attestation      I,:  Piedad Ureña am acting as a scribe while in the presence of the attending physician.:       I,:  Lindsey Arce PA-C personally performed the services described in this documentation    as scribed in my presence.:             "

## 2024-07-01 DIAGNOSIS — I25.10 CORONARY ARTERY DISEASE INVOLVING NATIVE CORONARY ARTERY OF NATIVE HEART WITHOUT ANGINA PECTORIS: ICD-10-CM

## 2024-07-01 RX ORDER — LISINOPRIL 5 MG/1
5 TABLET ORAL DAILY
Qty: 90 TABLET | Refills: 1 | Status: SHIPPED | OUTPATIENT
Start: 2024-07-01

## 2024-08-01 ENCOUNTER — APPOINTMENT (OUTPATIENT)
Dept: LAB | Facility: CLINIC | Age: 69
End: 2024-08-01
Payer: COMMERCIAL

## 2024-08-01 DIAGNOSIS — E78.49 OTHER HYPERLIPIDEMIA: ICD-10-CM

## 2024-08-01 DIAGNOSIS — Z12.5 PROSTATE CANCER SCREENING: ICD-10-CM

## 2024-08-01 DIAGNOSIS — I10 PRIMARY HYPERTENSION: ICD-10-CM

## 2024-08-01 DIAGNOSIS — R73.03 PREDIABETES: ICD-10-CM

## 2024-08-01 LAB
ALBUMIN SERPL BCG-MCNC: 4.1 G/DL (ref 3.5–5)
ALP SERPL-CCNC: 43 U/L (ref 34–104)
ALT SERPL W P-5'-P-CCNC: 29 U/L (ref 7–52)
ANION GAP SERPL CALCULATED.3IONS-SCNC: 6 MMOL/L (ref 4–13)
AST SERPL W P-5'-P-CCNC: 24 U/L (ref 13–39)
BILIRUB SERPL-MCNC: 0.96 MG/DL (ref 0.2–1)
BUN SERPL-MCNC: 19 MG/DL (ref 5–25)
CALCIUM SERPL-MCNC: 9.1 MG/DL (ref 8.4–10.2)
CHLORIDE SERPL-SCNC: 102 MMOL/L (ref 96–108)
CHOLEST SERPL-MCNC: 124 MG/DL
CO2 SERPL-SCNC: 29 MMOL/L (ref 21–32)
CREAT SERPL-MCNC: 0.93 MG/DL (ref 0.6–1.3)
EST. AVERAGE GLUCOSE BLD GHB EST-MCNC: 108 MG/DL
GFR SERPL CREATININE-BSD FRML MDRD: 83 ML/MIN/1.73SQ M
GLUCOSE P FAST SERPL-MCNC: 98 MG/DL (ref 65–99)
HBA1C MFR BLD: 5.4 %
HDLC SERPL-MCNC: 48 MG/DL
LDLC SERPL CALC-MCNC: 48 MG/DL (ref 0–100)
POTASSIUM SERPL-SCNC: 4.1 MMOL/L (ref 3.5–5.3)
PROT SERPL-MCNC: 6.8 G/DL (ref 6.4–8.4)
PSA SERPL-MCNC: 0.74 NG/ML (ref 0–4)
SODIUM SERPL-SCNC: 137 MMOL/L (ref 135–147)
TRIGL SERPL-MCNC: 142 MG/DL

## 2024-08-01 PROCEDURE — 36415 COLL VENOUS BLD VENIPUNCTURE: CPT

## 2024-08-01 PROCEDURE — 83036 HEMOGLOBIN GLYCOSYLATED A1C: CPT

## 2024-08-01 PROCEDURE — 80061 LIPID PANEL: CPT

## 2024-08-01 PROCEDURE — 80053 COMPREHEN METABOLIC PANEL: CPT

## 2024-08-01 PROCEDURE — G0103 PSA SCREENING: HCPCS

## 2024-08-20 ENCOUNTER — RA CDI HCC (OUTPATIENT)
Dept: OTHER | Facility: HOSPITAL | Age: 69
End: 2024-08-20

## 2024-08-27 ENCOUNTER — OFFICE VISIT (OUTPATIENT)
Dept: FAMILY MEDICINE CLINIC | Facility: CLINIC | Age: 69
End: 2024-08-27
Payer: COMMERCIAL

## 2024-08-27 VITALS
TEMPERATURE: 97 F | HEIGHT: 69 IN | WEIGHT: 194 LBS | RESPIRATION RATE: 16 BRPM | DIASTOLIC BLOOD PRESSURE: 80 MMHG | BODY MASS INDEX: 28.73 KG/M2 | HEART RATE: 68 BPM | SYSTOLIC BLOOD PRESSURE: 126 MMHG

## 2024-08-27 DIAGNOSIS — I10 PRIMARY HYPERTENSION: Primary | ICD-10-CM

## 2024-08-27 DIAGNOSIS — Z13.1 SCREENING FOR DIABETES MELLITUS (DM): ICD-10-CM

## 2024-08-27 DIAGNOSIS — E78.49 OTHER HYPERLIPIDEMIA: ICD-10-CM

## 2024-08-27 DIAGNOSIS — R42 VERTIGO: ICD-10-CM

## 2024-08-27 DIAGNOSIS — I25.10 CORONARY ARTERY DISEASE INVOLVING NATIVE CORONARY ARTERY OF NATIVE HEART WITHOUT ANGINA PECTORIS: ICD-10-CM

## 2024-08-27 PROBLEM — R73.03 PREDIABETES: Status: RESOLVED | Noted: 2019-12-17 | Resolved: 2024-08-27

## 2024-08-27 PROCEDURE — 99214 OFFICE O/P EST MOD 30 MIN: CPT | Performed by: FAMILY MEDICINE

## 2024-08-27 PROCEDURE — G2211 COMPLEX E/M VISIT ADD ON: HCPCS | Performed by: FAMILY MEDICINE

## 2024-08-27 RX ORDER — ROSUVASTATIN CALCIUM 40 MG/1
40 TABLET, COATED ORAL EVERY MORNING
Qty: 90 TABLET | Refills: 1 | Status: SHIPPED | OUTPATIENT
Start: 2024-08-27

## 2024-08-27 RX ORDER — EZETIMIBE 10 MG/1
10 TABLET ORAL DAILY
Qty: 90 TABLET | Refills: 1 | Status: SHIPPED | OUTPATIENT
Start: 2024-08-27

## 2024-08-27 RX ORDER — METOPROLOL SUCCINATE 50 MG/1
50 TABLET, EXTENDED RELEASE ORAL DAILY
Qty: 90 TABLET | Refills: 1 | Status: SHIPPED | OUTPATIENT
Start: 2024-08-27

## 2024-08-27 NOTE — PATIENT INSTRUCTIONS
Next time you experience vertigo, especially lasting several days, you can contact me for a referral for evaluation by the balance center/vestibular physical therapist.

## 2024-08-27 NOTE — PROGRESS NOTES
Assessment/Plan:    No problem-specific Assessment & Plan notes found for this encounter.    Bppv intermittent  Suggest vestibular PT if recurs, can call for reorder if expires    Htn stable    HLD stable    CAD stable on meds  Suggest periodic stress test    Sean wnl     Diagnoses and all orders for this visit:    Primary hypertension  -     metoprolol succinate (TOPROL-XL) 50 mg 24 hr tablet; Take 1 tablet (50 mg total) by mouth daily  -     Comprehensive metabolic panel; Future    Other hyperlipidemia  -     rosuvastatin (CRESTOR) 40 MG tablet; Take 1 tablet (40 mg total) by mouth every morning  -     ezetimibe (ZETIA) 10 mg tablet; Take 1 tablet (10 mg total) by mouth daily    Vertigo  -     Ambulatory referral to Physical Therapy; Future    Screening for diabetes mellitus (DM)  -     Hemoglobin A1C; Future    Coronary artery disease involving native coronary artery of native heart without angina pectoris        Return in about 6 months (around 2/27/2025) for Recheck.    Subjective:      Patient ID: Jordin Figueroa is a 69 y.o. male.    Chief Complaint   Patient presents with    Follow-up     Review lab work results [id number]       HPI  Taking all meds  LFD mostly  Daily causal exercise, 10-15min    Vertigo a few times a year  Short lived  No PT in past  No trigger    Labs reviewed  Tolerating meds    The following portions of the patient's history were reviewed and updated as appropriate: allergies, current medications, past family history, past medical history, past social history, past surgical history and problem list.    Review of Systems   Respiratory:  Negative for shortness of breath.    Cardiovascular:  Negative for chest pain.         Current Outpatient Medications   Medication Sig Dispense Refill    Ascorbic Acid (Vitamin C) 500 MG CAPS once a week On Saturday      aspirin (ECOTRIN LOW STRENGTH) 81 mg EC tablet Take 81 mg by mouth every morning      Echinacea 500 MG CAPS once a week Saturday       "ezetimibe (ZETIA) 10 mg tablet Take 1 tablet (10 mg total) by mouth daily 90 tablet 1    Ginkgo Biloba 120 MG CAPS once a week Saturday      lisinopril (ZESTRIL) 5 mg tablet take 1 tablet by mouth once daily 90 tablet 1    metoprolol succinate (TOPROL-XL) 50 mg 24 hr tablet Take 1 tablet (50 mg total) by mouth daily 90 tablet 1    Multiple Vitamins-Minerals (CENTRUM SILVER 50+MEN PO) every morning      Omega-3 Fatty Acids (OMEGA 3 PO) once a week On Saturday      rosuvastatin (CRESTOR) 40 MG tablet Take 1 tablet (40 mg total) by mouth every morning 90 tablet 1    vitamin B-12 (VITAMIN B-12) 1,000 mcg tablet once a week On Saturday       No current facility-administered medications for this visit.       Objective:    /80   Pulse 68   Temp (!) 97 °F (36.1 °C)   Resp 16   Ht 5' 9\" (1.753 m)   Wt 88 kg (194 lb)   BMI 28.65 kg/m²        Physical Exam  Vitals and nursing note reviewed.   Constitutional:       Appearance: He is well-developed. He is not ill-appearing.   HENT:      Head: Normocephalic.      Right Ear: Tympanic membrane normal.      Left Ear: Tympanic membrane normal.   Eyes:      General: No scleral icterus.     Conjunctiva/sclera: Conjunctivae normal.   Neck:      Vascular: No carotid bruit.   Cardiovascular:      Rate and Rhythm: Normal rate and regular rhythm.   Pulmonary:      Effort: Pulmonary effort is normal. No respiratory distress.      Breath sounds: No wheezing.   Abdominal:      General: There is no distension.      Palpations: Abdomen is soft.      Tenderness: There is no abdominal tenderness.      Hernia: No hernia is present.   Genitourinary:     Penis: Normal.       Testes: Normal.      Prostate: Normal.      Rectum: Normal.   Musculoskeletal:         General: No deformity.      Cervical back: Neck supple.      Right lower leg: No edema.      Left lower leg: No edema.   Skin:     General: Skin is warm and dry.      Coloration: Skin is not pale.   Neurological:      Mental Status: " He is alert.      Motor: No weakness.      Gait: Gait normal.   Psychiatric:         Mood and Affect: Mood normal.         Behavior: Behavior normal.         Thought Content: Thought content normal.                Samir Jacome DO

## 2024-09-01 ENCOUNTER — APPOINTMENT (EMERGENCY)
Dept: RADIOLOGY | Facility: HOSPITAL | Age: 69
DRG: 694 | End: 2024-09-01
Payer: COMMERCIAL

## 2024-09-01 ENCOUNTER — HOSPITAL ENCOUNTER (INPATIENT)
Facility: HOSPITAL | Age: 69
LOS: 1 days | Discharge: HOME | DRG: 694 | End: 2024-09-03
Attending: STUDENT IN AN ORGANIZED HEALTH CARE EDUCATION/TRAINING PROGRAM | Admitting: STUDENT IN AN ORGANIZED HEALTH CARE EDUCATION/TRAINING PROGRAM
Payer: COMMERCIAL

## 2024-09-01 DIAGNOSIS — I25.10 CORONARY ARTERY DISEASE INVOLVING NATIVE CORONARY ARTERY OF NATIVE HEART WITHOUT ANGINA PECTORIS: ICD-10-CM

## 2024-09-01 DIAGNOSIS — N20.1 URETEROLITHIASIS: Primary | ICD-10-CM

## 2024-09-01 DIAGNOSIS — R74.8 ELEVATED LIPASE: ICD-10-CM

## 2024-09-01 DIAGNOSIS — N20.1 LEFT URETERAL CALCULUS: ICD-10-CM

## 2024-09-01 DIAGNOSIS — N13.30 HYDRONEPHROSIS: ICD-10-CM

## 2024-09-01 DIAGNOSIS — N17.9 AKI (ACUTE KIDNEY INJURY) (HCC): ICD-10-CM

## 2024-09-01 LAB
ALBUMIN SERPL BCG-MCNC: 4.2 G/DL (ref 3.5–5)
ALP SERPL-CCNC: 44 U/L (ref 34–104)
ALT SERPL W P-5'-P-CCNC: 24 U/L (ref 7–52)
ANION GAP SERPL CALCULATED.3IONS-SCNC: 6 MMOL/L (ref 4–13)
AST SERPL W P-5'-P-CCNC: 19 U/L (ref 13–39)
BACTERIA UR QL AUTO: NORMAL /HPF
BASOPHILS # BLD AUTO: 0.03 THOUSANDS/ÂΜL (ref 0–0.1)
BASOPHILS NFR BLD AUTO: 0 % (ref 0–1)
BILIRUB SERPL-MCNC: 1.32 MG/DL (ref 0.2–1)
BILIRUB UR QL STRIP: NEGATIVE
BUN SERPL-MCNC: 21 MG/DL (ref 5–25)
CALCIUM SERPL-MCNC: 9.4 MG/DL (ref 8.4–10.2)
CHLORIDE SERPL-SCNC: 101 MMOL/L (ref 96–108)
CLARITY UR: CLEAR
CO2 SERPL-SCNC: 29 MMOL/L (ref 21–32)
COLOR UR: ABNORMAL
CREAT SERPL-MCNC: 1.84 MG/DL (ref 0.6–1.3)
EOSINOPHIL # BLD AUTO: 0.11 THOUSAND/ÂΜL (ref 0–0.61)
EOSINOPHIL NFR BLD AUTO: 1 % (ref 0–6)
ERYTHROCYTE [DISTWIDTH] IN BLOOD BY AUTOMATED COUNT: 13.2 % (ref 11.6–15.1)
GFR SERPL CREATININE-BSD FRML MDRD: 36 ML/MIN/1.73SQ M
GLUCOSE SERPL-MCNC: 105 MG/DL (ref 65–140)
GLUCOSE UR STRIP-MCNC: NEGATIVE MG/DL
HCT VFR BLD AUTO: 46.9 % (ref 36.5–49.3)
HGB BLD-MCNC: 16.7 G/DL (ref 12–17)
HGB UR QL STRIP.AUTO: ABNORMAL
IMM GRANULOCYTES # BLD AUTO: 0.01 THOUSAND/UL (ref 0–0.2)
IMM GRANULOCYTES NFR BLD AUTO: 0 % (ref 0–2)
KETONES UR STRIP-MCNC: NEGATIVE MG/DL
LEUKOCYTE ESTERASE UR QL STRIP: NEGATIVE
LIPASE SERPL-CCNC: 196 U/L (ref 11–82)
LYMPHOCYTES # BLD AUTO: 1.23 THOUSANDS/ÂΜL (ref 0.6–4.47)
LYMPHOCYTES NFR BLD AUTO: 15 % (ref 14–44)
MCH RBC QN AUTO: 29.8 PG (ref 26.8–34.3)
MCHC RBC AUTO-ENTMCNC: 35.6 G/DL (ref 31.4–37.4)
MCV RBC AUTO: 84 FL (ref 82–98)
MONOCYTES # BLD AUTO: 0.67 THOUSAND/ÂΜL (ref 0.17–1.22)
MONOCYTES NFR BLD AUTO: 8 % (ref 4–12)
NEUTROPHILS # BLD AUTO: 6.23 THOUSANDS/ÂΜL (ref 1.85–7.62)
NEUTS SEG NFR BLD AUTO: 76 % (ref 43–75)
NITRITE UR QL STRIP: NEGATIVE
NON-SQ EPI CELLS URNS QL MICRO: NORMAL /HPF
NRBC BLD AUTO-RTO: 0 /100 WBCS
PH UR STRIP.AUTO: 6.5 [PH]
PLATELET # BLD AUTO: 158 THOUSANDS/UL (ref 149–390)
PMV BLD AUTO: 9.2 FL (ref 8.9–12.7)
POTASSIUM SERPL-SCNC: 4.2 MMOL/L (ref 3.5–5.3)
PROT SERPL-MCNC: 6.5 G/DL (ref 6.4–8.4)
PROT UR STRIP-MCNC: NEGATIVE MG/DL
RBC # BLD AUTO: 5.61 MILLION/UL (ref 3.88–5.62)
RBC #/AREA URNS AUTO: NORMAL /HPF
SODIUM SERPL-SCNC: 136 MMOL/L (ref 135–147)
SP GR UR STRIP.AUTO: 1.01 (ref 1–1.03)
UROBILINOGEN UR STRIP-ACNC: <2 MG/DL
WBC # BLD AUTO: 8.28 THOUSAND/UL (ref 4.31–10.16)
WBC #/AREA URNS AUTO: NORMAL /HPF

## 2024-09-01 PROCEDURE — 85025 COMPLETE CBC W/AUTO DIFF WBC: CPT | Performed by: STUDENT IN AN ORGANIZED HEALTH CARE EDUCATION/TRAINING PROGRAM

## 2024-09-01 PROCEDURE — 80053 COMPREHEN METABOLIC PANEL: CPT | Performed by: STUDENT IN AN ORGANIZED HEALTH CARE EDUCATION/TRAINING PROGRAM

## 2024-09-01 PROCEDURE — 83690 ASSAY OF LIPASE: CPT | Performed by: STUDENT IN AN ORGANIZED HEALTH CARE EDUCATION/TRAINING PROGRAM

## 2024-09-01 PROCEDURE — 81001 URINALYSIS AUTO W/SCOPE: CPT | Performed by: STUDENT IN AN ORGANIZED HEALTH CARE EDUCATION/TRAINING PROGRAM

## 2024-09-01 PROCEDURE — 74177 CT ABD & PELVIS W/CONTRAST: CPT

## 2024-09-01 PROCEDURE — 36415 COLL VENOUS BLD VENIPUNCTURE: CPT | Performed by: STUDENT IN AN ORGANIZED HEALTH CARE EDUCATION/TRAINING PROGRAM

## 2024-09-01 PROCEDURE — 99285 EMERGENCY DEPT VISIT HI MDM: CPT | Performed by: STUDENT IN AN ORGANIZED HEALTH CARE EDUCATION/TRAINING PROGRAM

## 2024-09-01 PROCEDURE — NC001 PR NO CHARGE: Performed by: EMERGENCY MEDICINE

## 2024-09-01 PROCEDURE — 82248 BILIRUBIN DIRECT: CPT

## 2024-09-01 PROCEDURE — 99284 EMERGENCY DEPT VISIT MOD MDM: CPT

## 2024-09-01 RX ORDER — TAMSULOSIN HYDROCHLORIDE 0.4 MG/1
0.4 CAPSULE ORAL ONCE
Status: COMPLETED | OUTPATIENT
Start: 2024-09-01 | End: 2024-09-02

## 2024-09-01 RX ADMIN — IOHEXOL 100 ML: 350 INJECTION, SOLUTION INTRAVENOUS at 22:12

## 2024-09-01 NOTE — LETTER
Thank you for allowing us to participate in the care of your patient, Jordin Figueroa, who was hospitalized from [unfilled] through 9/3/2024 with the admitting diagnosis of nephrolithiasis.      Medication Changes:  Start tamsulosin  Hold lisinopril    Outpatient testing recommended:  Recheck BMP in 5 days    If you have any additional questions or would like to discuss further, please feel free to contact me.    Chuy Lopez MD  [facility] Internal Medicine, Hospitalist  [phone]

## 2024-09-02 PROBLEM — N20.1 LEFT URETERAL CALCULUS: Status: ACTIVE | Noted: 2024-09-02

## 2024-09-02 PROBLEM — N17.9 AKI (ACUTE KIDNEY INJURY) (HCC): Status: ACTIVE | Noted: 2024-09-02

## 2024-09-02 PROBLEM — R74.8 ELEVATED LIPASE: Status: ACTIVE | Noted: 2024-09-02

## 2024-09-02 PROBLEM — D69.6 THROMBOCYTOPENIA (HCC): Status: ACTIVE | Noted: 2024-09-02

## 2024-09-02 LAB
ANION GAP SERPL CALCULATED.3IONS-SCNC: 8 MMOL/L (ref 4–13)
BASOPHILS # BLD AUTO: 0.03 THOUSANDS/ÂΜL (ref 0–0.1)
BASOPHILS NFR BLD AUTO: 1 % (ref 0–1)
BILIRUB DIRECT SERPL-MCNC: 0.26 MG/DL (ref 0–0.2)
BUN SERPL-MCNC: 21 MG/DL (ref 5–25)
CALCIUM SERPL-MCNC: 8.8 MG/DL (ref 8.4–10.2)
CHLORIDE SERPL-SCNC: 101 MMOL/L (ref 96–108)
CO2 SERPL-SCNC: 25 MMOL/L (ref 21–32)
CREAT SERPL-MCNC: 1.57 MG/DL (ref 0.6–1.3)
EOSINOPHIL # BLD AUTO: 0.12 THOUSAND/ÂΜL (ref 0–0.61)
EOSINOPHIL NFR BLD AUTO: 2 % (ref 0–6)
ERYTHROCYTE [DISTWIDTH] IN BLOOD BY AUTOMATED COUNT: 13.2 % (ref 11.6–15.1)
GFR SERPL CREATININE-BSD FRML MDRD: 44 ML/MIN/1.73SQ M
GLUCOSE SERPL-MCNC: 103 MG/DL (ref 65–140)
HCT VFR BLD AUTO: 43.7 % (ref 36.5–49.3)
HGB BLD-MCNC: 15.5 G/DL (ref 12–17)
IMM GRANULOCYTES # BLD AUTO: 0.01 THOUSAND/UL (ref 0–0.2)
IMM GRANULOCYTES NFR BLD AUTO: 0 % (ref 0–2)
LIPASE SERPL-CCNC: 17 U/L (ref 11–82)
LYMPHOCYTES # BLD AUTO: 1.43 THOUSANDS/ÂΜL (ref 0.6–4.47)
LYMPHOCYTES NFR BLD AUTO: 25 % (ref 14–44)
MAGNESIUM SERPL-MCNC: 2.3 MG/DL (ref 1.9–2.7)
MCH RBC QN AUTO: 29.8 PG (ref 26.8–34.3)
MCHC RBC AUTO-ENTMCNC: 35.5 G/DL (ref 31.4–37.4)
MCV RBC AUTO: 84 FL (ref 82–98)
MONOCYTES # BLD AUTO: 0.52 THOUSAND/ÂΜL (ref 0.17–1.22)
MONOCYTES NFR BLD AUTO: 9 % (ref 4–12)
NEUTROPHILS # BLD AUTO: 3.53 THOUSANDS/ÂΜL (ref 1.85–7.62)
NEUTS SEG NFR BLD AUTO: 63 % (ref 43–75)
NRBC BLD AUTO-RTO: 0 /100 WBCS
PLATELET # BLD AUTO: 144 THOUSANDS/UL (ref 149–390)
PMV BLD AUTO: 9.5 FL (ref 8.9–12.7)
POTASSIUM SERPL-SCNC: 4.1 MMOL/L (ref 3.5–5.3)
RBC # BLD AUTO: 5.2 MILLION/UL (ref 3.88–5.62)
SODIUM SERPL-SCNC: 134 MMOL/L (ref 135–147)
WBC # BLD AUTO: 5.64 THOUSAND/UL (ref 4.31–10.16)

## 2024-09-02 PROCEDURE — 99223 1ST HOSP IP/OBS HIGH 75: CPT

## 2024-09-02 PROCEDURE — 85025 COMPLETE CBC W/AUTO DIFF WBC: CPT

## 2024-09-02 PROCEDURE — 80048 BASIC METABOLIC PNL TOTAL CA: CPT

## 2024-09-02 PROCEDURE — 83690 ASSAY OF LIPASE: CPT

## 2024-09-02 PROCEDURE — 83735 ASSAY OF MAGNESIUM: CPT

## 2024-09-02 RX ORDER — METOPROLOL SUCCINATE 50 MG/1
50 TABLET, EXTENDED RELEASE ORAL DAILY
Status: DISCONTINUED | OUTPATIENT
Start: 2024-09-02 | End: 2024-09-03 | Stop reason: HOSPADM

## 2024-09-02 RX ORDER — OXYCODONE HYDROCHLORIDE 5 MG/1
5 TABLET ORAL EVERY 4 HOURS PRN
Status: DISCONTINUED | OUTPATIENT
Start: 2024-09-02 | End: 2024-09-03 | Stop reason: HOSPADM

## 2024-09-02 RX ORDER — LISINOPRIL 5 MG/1
5 TABLET ORAL DAILY
Status: DISCONTINUED | OUTPATIENT
Start: 2024-09-02 | End: 2024-09-02

## 2024-09-02 RX ORDER — ASPIRIN 81 MG/1
81 TABLET, CHEWABLE ORAL DAILY
Status: DISCONTINUED | OUTPATIENT
Start: 2024-09-02 | End: 2024-09-03 | Stop reason: HOSPADM

## 2024-09-02 RX ORDER — EZETIMIBE 10 MG/1
10 TABLET ORAL DAILY
Status: DISCONTINUED | OUTPATIENT
Start: 2024-09-02 | End: 2024-09-03 | Stop reason: HOSPADM

## 2024-09-02 RX ORDER — ATORVASTATIN CALCIUM 80 MG/1
80 TABLET, FILM COATED ORAL
Status: DISCONTINUED | OUTPATIENT
Start: 2024-09-02 | End: 2024-09-03 | Stop reason: HOSPADM

## 2024-09-02 RX ORDER — POLYETHYLENE GLYCOL 3350 17 G/17G
17 POWDER, FOR SOLUTION ORAL DAILY
Status: DISCONTINUED | OUTPATIENT
Start: 2024-09-02 | End: 2024-09-03 | Stop reason: HOSPADM

## 2024-09-02 RX ORDER — SODIUM CHLORIDE 9 MG/ML
100 INJECTION, SOLUTION INTRAVENOUS CONTINUOUS
Status: DISCONTINUED | OUTPATIENT
Start: 2024-09-02 | End: 2024-09-03 | Stop reason: HOSPADM

## 2024-09-02 RX ORDER — ACETAMINOPHEN 325 MG/1
650 TABLET ORAL EVERY 6 HOURS PRN
Status: DISCONTINUED | OUTPATIENT
Start: 2024-09-02 | End: 2024-09-03 | Stop reason: HOSPADM

## 2024-09-02 RX ORDER — HYDROMORPHONE HCL/PF 1 MG/ML
0.2 SYRINGE (ML) INJECTION EVERY 6 HOURS PRN
Status: DISCONTINUED | OUTPATIENT
Start: 2024-09-02 | End: 2024-09-03 | Stop reason: HOSPADM

## 2024-09-02 RX ORDER — DOCUSATE SODIUM 100 MG/1
100 CAPSULE, LIQUID FILLED ORAL 2 TIMES DAILY
Status: DISCONTINUED | OUTPATIENT
Start: 2024-09-02 | End: 2024-09-03 | Stop reason: HOSPADM

## 2024-09-02 RX ORDER — LANOLIN ALCOHOL/MO/W.PET/CERES
6 CREAM (GRAM) TOPICAL
Status: DISCONTINUED | OUTPATIENT
Start: 2024-09-02 | End: 2024-09-03 | Stop reason: HOSPADM

## 2024-09-02 RX ORDER — ENOXAPARIN SODIUM 100 MG/ML
40 INJECTION SUBCUTANEOUS DAILY
Status: DISCONTINUED | OUTPATIENT
Start: 2024-09-02 | End: 2024-09-03 | Stop reason: HOSPADM

## 2024-09-02 RX ADMIN — DOCUSATE SODIUM 100 MG: 100 CAPSULE, LIQUID FILLED ORAL at 08:41

## 2024-09-02 RX ADMIN — TAMSULOSIN HYDROCHLORIDE 0.4 MG: 0.4 CAPSULE ORAL at 00:23

## 2024-09-02 RX ADMIN — ENOXAPARIN SODIUM 40 MG: 40 INJECTION SUBCUTANEOUS at 08:41

## 2024-09-02 RX ADMIN — ACETAMINOPHEN 650 MG: 325 TABLET ORAL at 01:18

## 2024-09-02 RX ADMIN — Medication 6 MG: at 22:01

## 2024-09-02 RX ADMIN — EZETIMIBE 10 MG: 10 TABLET ORAL at 08:41

## 2024-09-02 RX ADMIN — Medication 6 MG: at 01:18

## 2024-09-02 RX ADMIN — SODIUM CHLORIDE 100 ML/HR: 0.9 INJECTION, SOLUTION INTRAVENOUS at 19:20

## 2024-09-02 RX ADMIN — SODIUM CHLORIDE 100 ML/HR: 0.9 INJECTION, SOLUTION INTRAVENOUS at 10:48

## 2024-09-02 RX ADMIN — ASPIRIN 81 MG CHEWABLE TABLET 81 MG: 81 TABLET CHEWABLE at 08:41

## 2024-09-02 RX ADMIN — METOPROLOL SUCCINATE 50 MG: 50 TABLET, EXTENDED RELEASE ORAL at 08:41

## 2024-09-02 RX ADMIN — SODIUM CHLORIDE 100 ML/HR: 0.9 INJECTION, SOLUTION INTRAVENOUS at 00:48

## 2024-09-02 RX ADMIN — ACETAMINOPHEN 650 MG: 325 TABLET ORAL at 22:01

## 2024-09-02 RX ADMIN — ATORVASTATIN CALCIUM 80 MG: 80 TABLET, FILM COATED ORAL at 15:53

## 2024-09-02 RX ADMIN — B-COMPLEX W/ C & FOLIC ACID TAB 1 TABLET: TAB at 08:41

## 2024-09-02 RX ADMIN — DOCUSATE SODIUM 100 MG: 100 CAPSULE, LIQUID FILLED ORAL at 17:08

## 2024-09-02 NOTE — ED PROVIDER NOTES
History  Chief Complaint   Patient presents with    Abdominal Pain     C/o Lt rib/flank pain, intermittent, gets worse right after eating. Hx of initial diverticulitis last June.      Patient is a 69-year-old male, past medical history including CAD, who presents the emergency room for left-sided abdominal pain.  Started yesterday.  Notes it is mainly after eating.  Episodes last for varying amounts of time.  No other modifying factors.  No associated symptoms.  No prior history of pain like this in the past.  No fevers.  No vomiting.  Did have soft stool yesterday.  Nonbloody.  Thinks this may be diverticulitis.  States he had a colonoscopy last year that was unremarkable.  No other complaints or concerns.    Colonoscopy from last year reviewed.  Few diverticula in the sigmoid colon. Otherwise normal colon        Abdominal Pain      Prior to Admission Medications   Prescriptions Last Dose Informant Patient Reported? Taking?   Ascorbic Acid (Vitamin C) 500 MG CAPS 9/1/2024 Self Yes Yes   Sig: once a week On Saturday   Echinacea 500 MG CAPS 9/1/2024 Self Yes Yes   Sig: once a week Saturday   Ginkgo Biloba 120 MG CAPS 9/1/2024 Self Yes Yes   Sig: once a week Saturday   Multiple Vitamins-Minerals (CENTRUM SILVER 50+MEN PO) 9/1/2024 Self Yes Yes   Sig: every morning   Omega-3 Fatty Acids (OMEGA 3 PO) 9/1/2024 Self Yes Yes   Sig: once a week On Saturday   aspirin (ECOTRIN LOW STRENGTH) 81 mg EC tablet 9/1/2024 Self Yes Yes   Sig: Take 81 mg by mouth every morning   ezetimibe (ZETIA) 10 mg tablet 9/1/2024  No Yes   Sig: Take 1 tablet (10 mg total) by mouth daily   lisinopril (ZESTRIL) 5 mg tablet 9/1/2024  No Yes   Sig: take 1 tablet by mouth once daily   metoprolol succinate (TOPROL-XL) 50 mg 24 hr tablet 9/1/2024  No Yes   Sig: Take 1 tablet (50 mg total) by mouth daily   rosuvastatin (CRESTOR) 40 MG tablet 9/1/2024  No Yes   Sig: Take 1 tablet (40 mg total) by mouth every morning   vitamin B-12 (VITAMIN B-12) 1,000 mcg  tablet 9/1/2024 Self Yes Yes   Sig: once a week On Saturday      Facility-Administered Medications: None       Past Medical History:   Diagnosis Date    Coronary artery disease     Ischemia     Migraine     Myocardial infarction (HCC) 1999    one stent       Past Surgical History:   Procedure Laterality Date    ANGIOPLASTY  1999    one stent    ARTERIAL BYPASS SURGERY      COLONOSCOPY  2015    CORONARY ARTERY BYPASS GRAFT  2009    x4    LASIK         Family History   Problem Relation Age of Onset    Alzheimer's disease Mother     Other Mother         alzheimers    Cancer Father         rectal    Stroke Father     Rectal cancer Father      I have reviewed and agree with the history as documented.    E-Cigarette/Vaping    E-Cigarette Use Never User      E-Cigarette/Vaping Substances    Nicotine No     THC No     CBD No     Flavoring No     Other No     Unknown No      Social History     Tobacco Use    Smoking status: Never     Passive exposure: Past    Smokeless tobacco: Never   Vaping Use    Vaping status: Never Used   Substance Use Topics    Alcohol use: Yes     Comment: seldom    Drug use: Never       Review of Systems   Gastrointestinal:  Positive for abdominal pain.   All other systems reviewed and are negative.      Physical Exam  Physical Exam  Vitals and nursing note reviewed.   Constitutional:       General: He is not in acute distress.     Appearance: He is well-developed. He is not ill-appearing, toxic-appearing or diaphoretic.   HENT:      Head: Normocephalic and atraumatic.      Right Ear: External ear normal.      Left Ear: External ear normal.      Nose: Nose normal.   Eyes:      General: Lids are normal. No scleral icterus.  Cardiovascular:      Rate and Rhythm: Normal rate and regular rhythm.      Heart sounds: Normal heart sounds. No murmur heard.     No friction rub. No gallop.   Pulmonary:      Effort: Pulmonary effort is normal. No respiratory distress.      Breath sounds: Normal breath sounds. No  wheezing or rales.   Abdominal:      Palpations: Abdomen is soft.      Tenderness: There is abdominal tenderness. There is no guarding or rebound.       Musculoskeletal:         General: No deformity. Normal range of motion.      Cervical back: Normal range of motion and neck supple.   Skin:     General: Skin is warm and dry.   Neurological:      General: No focal deficit present.      Mental Status: He is alert.   Psychiatric:         Mood and Affect: Mood normal.         Behavior: Behavior normal.         Vital Signs  ED Triage Vitals [09/01/24 1943]   Temperature Pulse Respirations Blood Pressure SpO2   98.1 °F (36.7 °C) 86 18 136/80 95 %      Temp Source Heart Rate Source Patient Position - Orthostatic VS BP Location FiO2 (%)   Temporal Monitor Sitting Right arm --      Pain Score       8           Vitals:    09/01/24 2300 09/01/24 2330 09/02/24 0045 09/02/24 0740   BP: 124/72 151/82 153/92 118/68   Pulse: 75 77 78 77   Patient Position - Orthostatic VS: Sitting Sitting Sitting Lying         Visual Acuity      ED Medications  Medications   aspirin chewable tablet 81 mg (81 mg Oral Given 9/2/24 0841)   ezetimibe (ZETIA) tablet 10 mg (10 mg Oral Given 9/2/24 0841)   metoprolol succinate (TOPROL-XL) 24 hr tablet 50 mg (50 mg Oral Given 9/2/24 0841)   multivitamin stress formula tablet 1 tablet (1 tablet Oral Given 9/2/24 0841)   atorvastatin (LIPITOR) tablet 80 mg (has no administration in time range)   sodium chloride 0.9 % infusion (100 mL/hr Intravenous New Bag 9/2/24 1048)   acetaminophen (TYLENOL) tablet 650 mg (650 mg Oral Given 9/2/24 0118)   oxyCODONE (ROXICODONE) split tablet 2.5 mg (has no administration in time range)     Or   oxyCODONE (ROXICODONE) IR tablet 5 mg (has no administration in time range)   HYDROmorphone (DILAUDID) injection 0.2 mg (has no administration in time range)   naloxone (NARCAN) 0.04 mg/mL syringe 0.04 mg (has no administration in time range)   docusate sodium (COLACE) capsule 100  mg (100 mg Oral Given 9/2/24 0841)   polyethylene glycol (MIRALAX) packet 17 g (17 g Oral Not Given 9/2/24 0845)   enoxaparin (LOVENOX) subcutaneous injection 40 mg (40 mg Subcutaneous Given 9/2/24 0841)   melatonin tablet 6 mg (6 mg Oral Given 9/2/24 0118)   iohexol (OMNIPAQUE) 350 MG/ML injection (MULTI-DOSE) 100 mL (100 mL Intravenous Given 9/1/24 2212)   tamsulosin (FLOMAX) capsule 0.4 mg (0.4 mg Oral Given 9/2/24 0023)       Diagnostic Studies  Results Reviewed       Procedure Component Value Units Date/Time    Bilirubin, direct [[id number]]  (Abnormal) Collected: 09/01/24 2026    Lab Status: Final result Specimen: Blood from Arm, Left Updated: 09/02/24 0847     Bilirubin, Direct 0.26 mg/dL     Basic metabolic panel [[id number]]  (Abnormal) Collected: 09/02/24 0537    Lab Status: Final result Specimen: Blood from Arm, Right Updated: 09/02/24 0616     Sodium 134 mmol/L      Potassium 4.1 mmol/L      Chloride 101 mmol/L      CO2 25 mmol/L      ANION GAP 8 mmol/L      BUN 21 mg/dL      Creatinine 1.57 mg/dL      Glucose 103 mg/dL      Calcium 8.8 mg/dL      eGFR 44 ml/min/1.73sq m     Narrative:      National Kidney Disease Foundation guidelines for Chronic Kidney Disease (CKD):     Stage 1 with normal or high GFR (GFR > 90 mL/min/1.73 square meters)    Stage 2 Mild CKD (GFR = 60-89 mL/min/1.73 square meters)    Stage 3A Moderate CKD (GFR = 45-59 mL/min/1.73 square meters)    Stage 3B Moderate CKD (GFR = 30-44 mL/min/1.73 square meters)    Stage 4 Severe CKD (GFR = 15-29 mL/min/1.73 square meters)    Stage 5 End Stage CKD (GFR <15 mL/min/1.73 square meters)  Note: GFR calculation is accurate only with a steady state creatinine    Magnesium [[id number]]  (Normal) Collected: 09/02/24 0537    Lab Status: Final result Specimen: Blood from Arm, Right Updated: 09/02/24 0616     Magnesium 2.3 mg/dL     CBC and differential [[id number]]  (Abnormal) Collected: 09/02/24 0537    Lab Status: Final result Specimen: Blood from Arm,  Right Updated: 09/02/24 0558     WBC 5.64 Thousand/uL      RBC 5.20 Million/uL      Hemoglobin 15.5 g/dL      Hematocrit 43.7 %      MCV 84 fL      MCH 29.8 pg      MCHC 35.5 g/dL      RDW 13.2 %      MPV 9.5 fL      Platelets 144 Thousands/uL      nRBC 0 /100 WBCs      Segmented % 63 %      Immature Grans % 0 %      Lymphocytes % 25 %      Monocytes % 9 %      Eosinophils Relative 2 %      Basophils Relative 1 %      Absolute Neutrophils 3.53 Thousands/µL      Absolute Immature Grans 0.01 Thousand/uL      Absolute Lymphocytes 1.43 Thousands/µL      Absolute Monocytes 0.52 Thousand/µL      Eosinophils Absolute 0.12 Thousand/µL      Basophils Absolute 0.03 Thousands/µL     Urine Microscopic [[id number]]  (Normal) Collected: 09/01/24 2036    Lab Status: Final result Specimen: Urine, Clean Catch Updated: 09/01/24 2124     RBC, UA 0-5 /hpf      WBC, UA None Seen /hpf      Epithelial Cells Occasional /hpf      Bacteria, UA None Seen /hpf     CMP [[id number]]  (Abnormal) Collected: 09/01/24 2026    Lab Status: Final result Specimen: Blood from Arm, Left Updated: 09/01/24 2105     Sodium 136 mmol/L      Potassium 4.2 mmol/L      Chloride 101 mmol/L      CO2 29 mmol/L      ANION GAP 6 mmol/L      BUN 21 mg/dL      Creatinine 1.84 mg/dL      Glucose 105 mg/dL      Calcium 9.4 mg/dL      AST 19 U/L      ALT 24 U/L      Alkaline Phosphatase 44 U/L      Total Protein 6.5 g/dL      Albumin 4.2 g/dL      Total Bilirubin 1.32 mg/dL      eGFR 36 ml/min/1.73sq m     Narrative:      National Kidney Disease Foundation guidelines for Chronic Kidney Disease (CKD):     Stage 1 with normal or high GFR (GFR > 90 mL/min/1.73 square meters)    Stage 2 Mild CKD (GFR = 60-89 mL/min/1.73 square meters)    Stage 3A Moderate CKD (GFR = 45-59 mL/min/1.73 square meters)    Stage 3B Moderate CKD (GFR = 30-44 mL/min/1.73 square meters)    Stage 4 Severe CKD (GFR = 15-29 mL/min/1.73 square meters)    Stage 5 End Stage CKD (GFR <15 mL/min/1.73 square  meters)  Note: GFR calculation is accurate only with a steady state creatinine    Lipase [[id number]]  (Abnormal) Collected: 09/01/24 2026    Lab Status: Final result Specimen: Blood from Arm, Left Updated: 09/01/24 2105     Lipase 196 u/L     UA w Reflex to Microscopic w Reflex to Culture [[id number]]  (Abnormal) Collected: 09/01/24 2036    Lab Status: Final result Specimen: Urine, Clean Catch Updated: 09/01/24 2042     Color, UA Light Yellow     Clarity, UA Clear     Specific Gravity, UA 1.015     pH, UA 6.5     Leukocytes, UA Negative     Nitrite, UA Negative     Protein, UA Negative mg/dl      Glucose, UA Negative mg/dl      Ketones, UA Negative mg/dl      Urobilinogen, UA <2.0 mg/dl      Bilirubin, UA Negative     Occult Blood, UA Small    CBC and differential [[id number]]  (Abnormal) Collected: 09/01/24 2026    Lab Status: Final result Specimen: Blood from Arm, Left Updated: 09/01/24 2037     WBC 8.28 Thousand/uL      RBC 5.61 Million/uL      Hemoglobin 16.7 g/dL      Hematocrit 46.9 %      MCV 84 fL      MCH 29.8 pg      MCHC 35.6 g/dL      RDW 13.2 %      MPV 9.2 fL      Platelets 158 Thousands/uL      nRBC 0 /100 WBCs      Segmented % 76 %      Immature Grans % 0 %      Lymphocytes % 15 %      Monocytes % 8 %      Eosinophils Relative 1 %      Basophils Relative 0 %      Absolute Neutrophils 6.23 Thousands/µL      Absolute Immature Grans 0.01 Thousand/uL      Absolute Lymphocytes 1.23 Thousands/µL      Absolute Monocytes 0.67 Thousand/µL      Eosinophils Absolute 0.11 Thousand/µL      Basophils Absolute 0.03 Thousands/µL                    CT Abdomen pelvis with contrast   Final Result by Marc Coronel DO (09/01 2318)      7 mm obstructing left ureteral calculus with mild left hydronephrosis.         Workstation performed: [id number]                    Procedures  Procedures         ED Course  ED Course as of 09/02/24 1313   Sun Sep 01, 2024   2300 Pt signed out to Dr Hurst. Pending CT AP read  "                                SBIRT 22yo+      Flowsheet Row Most Recent Value   Initial Alcohol Screen: US AUDIT-C     1. How often do you have a drink containing alcohol? 0 Filed at: 09/01/2024 1945   2. How many drinks containing alcohol do you have on a typical day you are drinking?  0 Filed at: 09/01/2024 1945   3a. Male UNDER 65: How often do you have five or more drinks on one occasion? 0 Filed at: 09/01/2024 1945   3b. FEMALE Any Age, or MALE 65+: How often do you have 4 or more drinks on one occassion? 0 Filed at: 09/01/2024 1945   Audit-C Score 0 Filed at: 09/01/2024 1945   MOOSE: How many times in the past year have you...    Used an illegal drug or used a prescription medication for non-medical reasons? Never Filed at: 09/01/2024 1945                      Medical Decision Making  Patient is a 69 y.o. male who presents to the ED for abd pain.  Patient is nontoxic, well-appearing.  Vitals are stable.  On exam is left-sided abdominal tenderness no rebound or guarding.    Differential diagnosis includes: Diverticulitis, nephrolithiasis, colitis.  Low suspicion for acute hepatobiliary disease (includng acute cholecystitis), acute pancreatitis, PUD (including perforation), acute infectious processes (pneumonia, hepatitis, pyelonephritis), acute appendicitis, vascular catastrophe, bowel obstruction or viscus perforation. Presentation not consistent with other acute, emergent causes of abdominal pain at this time.    Plan: labs, UA, CT AP, pain control PRN, serial reassessment                 Portions of the record may have been created with voice recognition software. Occasional wrong word or \"sound a like\" substitutions may have occurred due to the inherent limitations of voice recognition software. Read the chart carefully and recognize, using context, where substitutions have occurred.    Amount and/or Complexity of Data Reviewed  Labs: ordered.  Radiology: ordered.    Risk  Prescription drug " management.  Decision regarding hospitalization.                 Disposition  Final diagnoses:   Ureterolithiasis   Hydronephrosis   ANIVAL (acute kidney injury) ([facility])     Time reflects when diagnosis was documented in both MDM as applicable and the Disposition within this note       Time User Action Codes Description Comment    9/1/2024 11:31 PM Fernando Hurst Add [N20.1] Ureterolithiasis     9/1/2024 11:31 PM Fernando Hurst Add [N13.30] Hydronephrosis     9/1/2024 11:31 PM Fernando Hurst Add [N17.9] ANIVAL (acute kidney injury) ([facility])     9/2/2024  6:34 AM Terrie Dawn Add [R74.8] Elevated lipase           ED Disposition       ED Disposition   Admit    Condition   Stable    Date/Time   Sun Sep 1, 2024 3689    Comment   Case was discussed with hannah and the patient's admission status was agreed to be Admission Status: observation status to the service of Dr. barajas .               Follow-up Information    None         Current Discharge Medication List        CONTINUE these medications which have NOT CHANGED    Details   Ascorbic Acid (Vitamin C) 500 MG CAPS once a week On Saturday      aspirin (ECOTRIN LOW STRENGTH) 81 mg EC tablet Take 81 mg by mouth every morning      Echinacea 500 MG CAPS once a week Saturday      ezetimibe (ZETIA) 10 mg tablet Take 1 tablet (10 mg total) by mouth daily  Qty: 90 tablet, Refills: 1    Associated Diagnoses: Other hyperlipidemia      Ginkgo Biloba 120 MG CAPS once a week Saturday      lisinopril (ZESTRIL) 5 mg tablet take 1 tablet by mouth once daily  Qty: 90 tablet, Refills: 1    Associated Diagnoses: Coronary artery disease involving native coronary artery of native heart without angina pectoris      metoprolol succinate (TOPROL-XL) 50 mg 24 hr tablet Take 1 tablet (50 mg total) by mouth daily  Qty: 90 tablet, Refills: 1    Associated Diagnoses: Primary hypertension      Multiple Vitamins-Minerals (CENTRUM SILVER 50+MEN PO) every morning      Omega-3 Fatty Acids (OMEGA 3  PO) once a week On Saturday      rosuvastatin (CRESTOR) 40 MG tablet Take 1 tablet (40 mg total) by mouth every morning  Qty: 90 tablet, Refills: 1    Associated Diagnoses: Other hyperlipidemia      vitamin B-12 (VITAMIN B-12) 1,000 mcg tablet once a week On Saturday             No discharge procedures on file.    PDMP Review       None            ED Provider  Electronically Signed by             Emanuel Lane DO  09/02/24 9130

## 2024-09-02 NOTE — ASSESSMENT & PLAN NOTE
CAD SP MI in 1999, SP's PCA M LAD on 5/12/1999 and SP CABG 2009 x 3  Follows with [facility] cardiology  Home medication: Aspirin 81 mg, metoprolol 50 mg, rosuvastatin 40 mg switched to Lipitor 80 mg daily continue

## 2024-09-02 NOTE — CONSULTS
H&P Exam - Urology       Patient: Jordin Figueroa   : 1955 Sex: male   MRN: [id number]     CSN: [id number]      History of Present Illness   HPI:  Jordin Figueroa is a 69 y.o. male who presents with acute onset left flank pain  presenting late last night to [facility] (formerly [facility]) undergoing CAT scan confirming 7 mm left proximal ureteral stone.  Patient admitted for analgesics seen at the bedside stating this is his first attack his initial pain at home was a 8 out of 10 if presently is a 3 out of 10        Review of Systems:   Constitutional:  Negative for activity change, fever, chills and diaphoresis.   HENT: Negative for hearing loss and trouble swallowing.   Eyes: Negative for itching and visual disturbance.   Respiratory: Negative for chest tightness and shortness of breath.   Cardiovascular: Negative for chest pain, edema.   Gastrointestinal: Negative for abdominal distention, na abdominal pain, constipation, diarrhea, Nausea and vomiting.   Genitourinary: Negative for decreased urine volume, difficulty urinating, dysuria, enuresis, frequency, hematuria and urgency.   Musculoskeletal: Negative for gait problem and myalgias.   Neurological: Negative for dizziness and headaches.   Hematological: Does not bruise/bleed easily.       Historical Information   Past Medical History:   Diagnosis Date    Coronary artery disease     Ischemia     Migraine     Myocardial infarction (HCC)     one stent     Past Surgical History:   Procedure Laterality Date    ANGIOPLASTY      one stent    ARTERIAL BYPASS SURGERY      COLONOSCOPY  2015    CORONARY ARTERY BYPASS GRAFT  2009    x4    LASIK       Social History   Social History     Substance and Sexual Activity   Alcohol Use Yes    Comment: seldom     Social History     Substance and Sexual Activity   Drug Use Never     Social History     Tobacco Use   Smoking Status Never    Passive exposure: Past   Smokeless Tobacco Never     Family History:  "  Family History   Problem Relation Age of Onset    Alzheimer's disease Mother     Other Mother         alzheimers    Cancer Father         rectal    Stroke Father     Rectal cancer Father        Meds/Allergies     Medications Prior to Admission:     Ascorbic Acid (Vitamin C) 500 MG CAPS    aspirin (ECOTRIN LOW STRENGTH) 81 mg EC tablet    Echinacea 500 MG CAPS    ezetimibe (ZETIA) 10 mg tablet    Ginkgo Biloba 120 MG CAPS    lisinopril (ZESTRIL) 5 mg tablet    metoprolol succinate (TOPROL-XL) 50 mg 24 hr tablet    Multiple Vitamins-Minerals (CENTRUM SILVER 50+MEN PO)    Omega-3 Fatty Acids (OMEGA 3 PO)    rosuvastatin (CRESTOR) 40 MG tablet    vitamin B-12 (VITAMIN B-12) 1,000 mcg tablet  No Known Allergies    Objective   Vitals: /77   Pulse 77   Temp 97.5 °F (36.4 °C)   Resp 18   Ht 5' 9\" (1.753 m)   Wt 88 kg (194 lb)   SpO2 96%   BMI 28.65 kg/m²     Physical Exam:  General Alert awake   Normocephalic atraumatic PERRLA  Lungs clear bilaterally  Cardiac normal S1 normal S2  Abdomen soft, flank pain  Extremities no edema    I/O last 24 hours:  In: 970 [P.O.:960; I.V.:10]  Out: 1250 [Urine:1250]    Invasive Devices       Peripheral Intravenous Line  Duration             Peripheral IV 09/01/24 Distal;Left;Upper;Ventral (anterior) Arm <1 day                        Lab Results: CBC:   Lab Results   Component Value Date    WBC 5.64 09/02/2024    HGB 15.5 09/02/2024    HCT 43.7 09/02/2024    MCV 84 09/02/2024     (L) 09/02/2024    RBC 5.20 09/02/2024    MCH 29.8 09/02/2024    MCHC 35.5 09/02/2024    RDW 13.2 09/02/2024    MPV 9.5 09/02/2024    NRBC 0 09/02/2024     CMP:   Lab Results   Component Value Date     09/02/2024    CO2 25 09/02/2024    BUN 21 09/02/2024    CREATININE 1.57 (H) 09/02/2024    CALCIUM 8.8 09/02/2024    AST 19 09/01/2024    ALT 24 09/01/2024    ALKPHOS 44 09/01/2024    EGFR 44 09/02/2024     Urinalysis:   Lab Results   Component Value Date    COLORU Light Yellow 09/01/2024    " "CLARITYU Clear 09/01/2024    SPECGRAV 1.015 09/01/2024    PHUR 6.5 09/01/2024    LEUKOCYTESUR Negative 09/01/2024    NITRITE Negative 09/01/2024    GLUCOSEU Negative 09/01/2024    KETONESU Negative 09/01/2024    BILIRUBINUR Negative 09/01/2024    BLOODU Small (A) 09/01/2024     Urine Culture: No results found for: \"URINECX\"  PSA:   Lab Results   Component Value Date    PSA 0.736 08/01/2024    PSA 0.69 08/01/2023    PSA 0.7 04/19/2022    PSA 0.7 01/02/2020           Assessment/ Plan:  7 mm left proximal ureteral stone has a 25% chance of passing with medical expulsion therapy with tamsulosin over 4 to 6 weeks  Discussed with patient first attack will watch overnight if pain clears would like to be discharged and scheduled electively for left ureteroscopy stone extraction      Jersey Gunderson MD    "

## 2024-09-02 NOTE — ASSESSMENT & PLAN NOTE
On admission creatinine: 1.84, baseline 0.9-1.03  Suspect post renal secondary to obstruction  Monitor BMP  Avoid nephrotoxic agents  Continue with IV hydration

## 2024-09-02 NOTE — PLAN OF CARE

## 2024-09-02 NOTE — PLAN OF CARE

## 2024-09-02 NOTE — H&P
Formerly [facility], [facility]  H&P  Name: Jordin Figueroa 69 y.o. male I MRN: [id number]  Unit/Bed#: [address] [place] [address] Date of Admission: 9/1/2024   Date of Service: 9/2/2024 I Hospital Day: 0      Assessment & Plan   * Left ureteral calculus  Assessment & Plan  POA left-lower abdominal pain that wraps around into the groin started 24 hours ago.  Denies L CVA tenderness, dysuria or hematuria  CT of the abdomen pelvis with contrast: 7 mm obstructing proximal left ureteral calculus with mild left hydronephrosis.   UA positive for blood  Urology consulted appreciate recommendations  Pain medication PRN  Strain urine  Continue with IV hydration normal saline at 100 mL/h  Follow-up the urine culture    Elevated lipase  Assessment & Plan  POA  with left lower abdominal pain with lipase elevated 196  AST 19, ALT 24, & ALk Phos 44  Elevated Bilirubin 1.32  CT a/p w/o contrast large gallstone noted. No pericholecystic inflammatory change.   Monitor Bilirubin and CMP, & lipase  Consider consult surgery appreciate recs   Continue with IV hydration      ANIVAL (acute kidney injury) (HCC)  Assessment & Plan  On admission creatinine: 1.84, baseline 0.9-1.03  Suspect post renal secondary to obstruction  Monitor BMP  Avoid nephrotoxic agents  Continue with IV hydration    Primary hypertension  Assessment & Plan  Blood pressure stable  Home medication: Lisinopril 5 mg on hold in setting of ANIVAL   monitor BP    Coronary artery disease involving native coronary artery  Assessment & Plan  CAD SP MI in 1999, SP's PCA M LAD on 5/12/1999 and SP CABG 2009 x 3  Follows with [facility] cardiology  Home medication: Aspirin 81 mg, metoprolol 50 mg, rosuvastatin 40 mg switched to Lipitor 80 mg daily continue         VTE Pharmacologic Prophylaxis:   High Risk (Score >/= 5) - Pharmacological DVT Prophylaxis Ordered: enoxaparin (Lovenox). Sequential Compression Devices Ordered.  Code Status: Level 1 - Full Code with patient  Discussion with  family: Updated  (cousin) at bedside.    Anticipated Length of Stay: Patient will be admitted on an observation basis with an anticipated length of stay of less than 2 midnights secondary to left ureteral stone, requiring possible intervention, ANIVAL, requiring IV hydration, urology consulted.    Total Time Spent on Date of Encounter in care of patient:  mins. This time was spent on one or more of the following: performing physical exam; counseling and coordination of care; obtaining or reviewing history; documenting in the medical record; reviewing/ordering tests, medications or procedures; communicating with other healthcare professionals and discussing with patient's family/caregivers.    Chief Complaint: Left lower abdominal pain    History of Present Illness:  Jordin Figueroa is a 69 y.o. male with a PMH of CAD, hypertension, hyperlipidemia who presents with left lower abdominal pain.  Patient presents with left-sided lower abdominal pain started 24 hours ago.  States that the pain is sharp in nature and radiates down into groin.  Denies nausea vomiting diarrhea.  Does endorse some chills and cold sweats during the pain.  Denies dysuria and hematuria.     Review of Systems:  Review of Systems   Constitutional:  Positive for chills. Negative for fever.   HENT:  Negative for ear pain and sore throat.    Eyes:  Negative for pain and visual disturbance.   Respiratory:  Negative for cough and shortness of breath.    Cardiovascular:  Negative for chest pain and palpitations.   Gastrointestinal:  Positive for abdominal pain. Negative for vomiting.   Genitourinary:  Negative for dysuria and hematuria.   Musculoskeletal:  Negative for arthralgias and back pain.   Skin:  Negative for color change and rash.   Neurological:  Negative for seizures and syncope.   All other systems reviewed and are negative.      Past Medical and Surgical History:   Past Medical History:   Diagnosis Date    Coronary artery  disease     Ischemia     Migraine     Myocardial infarction (HCC) 1999    one stent       Past Surgical History:   Procedure Laterality Date    ANGIOPLASTY  1999    one stent    ARTERIAL BYPASS SURGERY      COLONOSCOPY  2015    CORONARY ARTERY BYPASS GRAFT  2009    x4    LASIK         Meds/Allergies:  Prior to Admission medications    Medication Sig Start Date End Date Taking? Authorizing Provider   Ascorbic Acid (Vitamin C) 500 MG CAPS once a week On Saturday 1/3/10   Historical Provider, MD   aspirin (ECOTRIN LOW STRENGTH) 81 mg EC tablet Take 81 mg by mouth every morning    Historical Provider, MD   Echinacea 500 MG CAPS once a week Saturday 1/3/10   Historical Provider, MD   ezetimibe (ZETIA) 10 mg tablet Take 1 tablet (10 mg total) by mouth daily 8/27/24   Samir Jacome, DO   Ginkgo Biloba 120 MG CAPS once a week Saturday 1/3/10   Historical Provider, MD   lisinopril (ZESTRIL) 5 mg tablet take 1 tablet by mouth once daily 7/1/24   Samir Jacome, DO   metoprolol succinate (TOPROL-XL) 50 mg 24 hr tablet Take 1 tablet (50 mg total) by mouth daily 8/27/24   Samir Jacome, DO   Multiple Vitamins-Minerals (CENTRUM SILVER 50+MEN PO) every morning 1/3/10   Historical Provider, MD   Omega-3 Fatty Acids (OMEGA 3 PO) once a week On Saturday 1/3/10   Historical Provider, MD   rosuvastatin (CRESTOR) 40 MG tablet Take 1 tablet (40 mg total) by mouth every morning 8/27/24   Samir Jacome, DO   vitamin B-12 (VITAMIN B-12) 1,000 mcg tablet once a week On Saturday 1/3/10   Historical Provider, MD     I have reviewed home medications with patient personally.    Allergies: No Known Allergies    Social History:  Marital Status: Single   Occupation: retired   Patient Pre-hospital Living Situation: Home  Patient Pre-hospital Level of Mobility: walks  Patient Pre-hospital Diet Restrictions: none  Substance Use History:   Social History     Substance and Sexual Activity   Alcohol Use Yes    Comment: seldom     Social History  "    Tobacco Use   Smoking Status Never    Passive exposure: Past   Smokeless Tobacco Never     Social History     Substance and Sexual Activity   Drug Use Never       Family History:  Family History   Problem Relation Age of Onset    Alzheimer's disease Mother     Other Mother         alzheimers    Cancer Father         rectal    Stroke Father     Rectal cancer Father        Physical Exam:     Vitals:   Blood Pressure: 153/92 (09/02/24 0045)  Pulse: 78 (09/02/24 0045)  Temperature: 97.7 °F (36.5 °C) (09/02/24 0045)  Temp Source: Oral (09/02/24 0045)  Respirations: 16 (09/02/24 0045)  Height: 5' 9\" (175.3 cm) (09/02/24 0037)  Weight - Scale: 88 kg (194 lb) (09/02/24 0538)  SpO2: 98 % (09/02/24 0045)    Physical Exam  Vitals and nursing note reviewed.   Constitutional:       General: He is not in acute distress.     Appearance: He is well-developed.   HENT:      Head: Normocephalic and atraumatic.   Eyes:      Conjunctiva/sclera: Conjunctivae normal.   Cardiovascular:      Rate and Rhythm: Normal rate and regular rhythm.      Heart sounds: No murmur heard.  Pulmonary:      Effort: Pulmonary effort is normal. No respiratory distress.      Breath sounds: Normal breath sounds.   Abdominal:      Palpations: Abdomen is soft.      Tenderness: There is abdominal tenderness in the left lower quadrant. There is no left CVA tenderness.   Musculoskeletal:         General: No swelling.      Cervical back: Neck supple.   Skin:     General: Skin is warm and dry.      Capillary Refill: Capillary refill takes less than 2 seconds.   Neurological:      Mental Status: He is alert.   Psychiatric:         Mood and Affect: Mood normal.          Additional Data:     Lab Results:  Results from last 7 days   Lab Units 09/02/24  0537   WBC Thousand/uL 5.64   HEMOGLOBIN g/dL 15.5   HEMATOCRIT % 43.7   PLATELETS Thousands/uL 144*   SEGS PCT % 63   LYMPHO PCT % 25   MONO PCT % 9   EOS PCT % 2     Results from last 7 days   Lab Units " 09/02/24  0537 09/01/24 2026   SODIUM mmol/L 134* 136   POTASSIUM mmol/L 4.1 4.2   CHLORIDE mmol/L 101 101   CO2 mmol/L 25 29   BUN mg/dL 21 21   CREATININE mg/dL 1.57* 1.84*   ANION GAP mmol/L 8 6   CALCIUM mg/dL 8.8 9.4   ALBUMIN g/dL  --  4.2   TOTAL BILIRUBIN mg/dL  --  1.32*   ALK PHOS U/L  --  44   ALT U/L  --  24   AST U/L  --  19   GLUCOSE RANDOM mg/dL 103 105             Lab Results   Component Value Date    HGBA1C 5.4 08/01/2024    HGBA1C 5.2 02/26/2024    HGBA1C 5.4 08/01/2023           Lines/Drains:  Invasive Devices       Peripheral Intravenous Line  Duration             Peripheral IV 09/01/24 Distal;Left;Upper;Ventral (anterior) Arm <1 day                        Imaging: Reviewed radiology reports from this admission including: abdominal/pelvic CT  CT Abdomen pelvis with contrast   Final Result by Marc Coronel DO (09/01 2318)      7 mm obstructing left ureteral calculus with mild left hydronephrosis.         Workstation performed: [id number]             EKG and Other Studies Reviewed on Admission:   EKG: No EKG obtained.    ** Please Note: This note has been constructed using a voice recognition system. **

## 2024-09-02 NOTE — ASSESSMENT & PLAN NOTE
POA  with left lower abdominal pain with lipase elevated 196  AST 19, ALT 24, & ALk Phos 44  Elevated Bilirubin 1.32  CT a/p w/o contrast large gallstone noted. No pericholecystic inflammatory change.   Monitor Bilirubin and CMP, & lipase  Consider consult surgery appreciate recs   Continue with IV hydration

## 2024-09-02 NOTE — ASSESSMENT & PLAN NOTE
POA left-lower abdominal pain that wraps around into the groin started 24 hours ago.  Denies L CVA tenderness, dysuria or hematuria  CT of the abdomen pelvis with contrast: 7 mm obstructing proximal left ureteral calculus with mild left hydronephrosis.   UA positive for blood  Urology consulted appreciate recommendations  Pain medication PRN  Strain urine  Continue with IV hydration normal saline at 100 mL/h  Follow-up the urine culture

## 2024-09-02 NOTE — ED PROVIDER NOTES
ED Course as of 09/01/24 2331   Sun Sep 01, 2024   2244 F/u CT abd pain, ok for home         Has ANIVAL on labs, 80s to 30s GFR over 1 mo    Has hydro and 7mm stone on CT    No infection on urine  Pain controlled      Given size and anival, first stone, will admit obs NPO     Fernando Hurst MD  09/01/24 5073

## 2024-09-02 NOTE — UTILIZATION REVIEW
OBSERVATION 9-1-24 FOR EVALUATION AND TREATMENT OF HYDRONEPHROSIS WITH IV FLUIDS.      Initial Clinical Review    Admission: Date/Time/Statement:   Admission Orders (From admission, onward)       Ordered        09/02/24 0817  INPATIENT ADMISSION  Once            09/01/24 2331  Place in Observation  Once                            ED Arrival Information       Expected   -    Arrival   9/1/2024 19:24    Acuity   Urgent              Means of arrival   Walk-In    Escorted by   Family Member    Service   Hospitalist    Admission type   Emergency              Arrival complaint   abdominal pain             Chief Complaint   Patient presents with    Abdominal Pain     C/o Lt rib/flank pain, intermittent, gets worse right after eating. Hx of initial diverticulitis last June.        Initial Presentation: 69 y.o. male presents to ed from home for evaluation and treatment of L flank pain, intermittent.  PMHX: diverticulitis, CAD, Mi, migraine, CABG.  Clinical assessment significant for LLQ ttp.  Imaging shows 7 mm obstructing L ureteral calculus with mild L hydronephrosis. AST 19, ALT 24, ALK PHOS 44, TBILI 1.32, CR 1.84 ( baseline ( 0.9-1.03).   Initially treated with flomax, iv fluid hydration.   Admit to observation for L ureteral calculus with plan for  lisinopril, BMP, urology consulted, strain urine, iv fluids 100/hr.     Anticipated Length of Stay/Certification Statement:  Patient will be admitted on an observation basis with an anticipated length of stay of less than 2 midnights secondary to left ureteral stone, requiring possible intervention, ANIVAL, requiring IV hydration, urology consulted.     Date: 9-2-24    Day 2: observation changed to inpatient   L ureteral calculus with L hydronephrosis.  Continue iv hydration .9% ns 100/hr.   Hold lisinopril.  BMP. Strain urine. Urology consult completed.  There is a 25% chance of passing with medical expulsion therapy with tamsulosin over 4-6 weeks.  Will watch overnight.  If  pain improves will consider outpatient L ureteroscopy stone extraction.    Date: 9-3-24   Day 3: Has surpassed a 2nd midnight with active treatments and services for obstructing ureteral calculus and L hydronephrosis.   Elevated T bili appears chronic.  Tolerating oral diet, voiding and BMs normal.   ANIVAL 1.88 r/t obstruction improved to 1.33 today.  Pain 3/10.  Iv fluids completed at 12. Patient agreeable to outpatient management.    ED Triage Vitals [09/01/24 1943]   Temperature Pulse Respirations Blood Pressure SpO2 Pain Score   98.1 °F (36.7 °C) 86 18 136/80 95 % 8     Weight (last 2 days)       Date/Time Weight    09/02/24 0538 88 (194)    09/02/24 0037 88.1 (194.2)            Vital Signs (last 3 days)       Date/Time Temp Pulse Resp BP MAP  SpO2 O2 Device [place] Coma Scale Score Pain    09/02/24 0800 -- -- -- -- -- -- -- 15 --    09/02/24 07:40:30 97.3 °F (36.3 °C) 77 18 118/68 85 96 % None (Room air) -- 3    09/02/24 00:45:06 97.7 °F (36.5 °C) 78 16 153/92 112 98 % None (Room air) -- --    09/02/24 0045 -- -- -- -- -- -- -- 15 --    09/02/24 0041 -- -- -- -- -- -- -- -- 4    09/01/24 2330 -- 77 16 151/82 110 96 % None (Room air) -- --    09/01/24 2309 -- -- -- -- -- -- -- 15 --    09/01/24 2300 -- 75 18 124/72 -- 96 % None (Room air) -- --    09/01/24 2130 -- 74 18 116/63 83 94 % None (Room air) -- --    09/01/24 2100 -- 75 18 116/65 85 92 % None (Room air) -- --    09/01/24 2014 -- 84 17 146/90 113 96 % None (Room air) -- --    09/01/24 1943 98.1 °F (36.7 °C) 86 18 136/80 -- 95 % None (Room air) -- 8         Pertinent Labs/Diagnostic Test Results:   Radiology:  CT Abdomen pelvis with contrast   Final  (09/01 2318)      7 mm obstructing left ureteral calculus with mild left hydronephrosis.        Cardiology:  No orders to display     GI:  No orders to display           Results from last 7 days   Lab Units 09/02/24  0537 09/01/24 2026   WBC Thousand/uL 5.64 8.28   HEMOGLOBIN g/dL 15.5 16.7   HEMATOCRIT % 43.7  46.9   PLATELETS Thousands/uL 144* 158   TOTAL NEUT ABS Thousands/µL 3.53 6.23         Results from last 7 days   Lab Units 09/02/24  0537 09/01/24 2026   SODIUM mmol/L 134* 136   POTASSIUM mmol/L 4.1 4.2   CHLORIDE mmol/L 101 101   CO2 mmol/L 25 29   ANION GAP mmol/L 8 6   BUN mg/dL 21 21   CREATININE mg/dL 1.57* 1.84*   EGFR ml/min/1.73sq m 44 36   CALCIUM mg/dL 8.8 9.4   MAGNESIUM mg/dL 2.3  --      Results from last 7 days   Lab Units 09/01/24 2026   AST U/L 19   ALT U/L 24   ALK PHOS U/L 44   TOTAL PROTEIN g/dL 6.5   ALBUMIN g/dL 4.2   TOTAL BILIRUBIN mg/dL 1.32*   BILIRUBIN DIRECT mg/dL 0.26*         Results from last 7 days   Lab Units 09/02/24 0537 09/01/24 2026   GLUCOSE RANDOM mg/dL 103 105     Results from last 7 days   Lab Units 09/02/24 0537 09/01/24 2026   LIPASE u/L 17 196*     Results from last 7 days   Lab Units 09/01/24 2036   CLARITY UA  Clear   COLOR UA  Light Yellow   SPEC GRAV UA  1.015   PH UA  6.5   GLUCOSE UA mg/dl Negative   KETONES UA mg/dl Negative   BLOOD UA  Small*   PROTEIN UA mg/dl Negative   NITRITE UA  Negative   BILIRUBIN UA  Negative   UROBILINOGEN UA (BE) mg/dl <2.0   LEUKOCYTES UA  Negative   WBC UA /hpf None Seen   RBC UA /hpf 0-5   BACTERIA UA /hpf None Seen   EPITHELIAL CELLS WET PREP /hpf Occasional       ED Treatment-Medication Administration from 09/01/2024 1924 to 09/02/2024 0036         Date/Time Order Dose Route Action     09/02/2024 0023 tamsulosin (FLOMAX) capsule 0.4 mg 0.4 mg Oral Given            Past Medical History:   Diagnosis Date    Coronary artery disease     Ischemia     Migraine     Myocardial infarction (HCC) 1999    one stent     Present on Admission:   Coronary artery disease involving native coronary artery   Primary hypertension   Left ureteral calculus   AINVAL (acute kidney injury) ([facility])   Elevated lipase      Admitting Diagnosis:     Hydronephrosis [N13.30]  Ureterolithiasis [N20.1]  Abdominal pain [R10.9]  ANIVAL (acute kidney injury) ([facility])  [N17.9]      Age/Sex: 69 y.o. male    Scheduled Medications:    aspirin, 81 mg, Oral, Daily  atorvastatin, 80 mg, Oral, Daily With Dinner  docusate sodium, 100 mg, Oral, BID  enoxaparin, 40 mg, Subcutaneous, Daily  ezetimibe, 10 mg, Oral, Daily  melatonin, 6 mg, Oral, HS  metoprolol succinate, 50 mg, Oral, Daily  multivitamin stress formula, 1 tablet, Oral, Daily  polyethylene glycol, 17 g, Oral, Daily      Continuous IV Infusions:  sodium chloride, 100 mL/hr, Intravenous, Continuous      PRN Meds:  acetaminophen, 650 mg, Oral, Q6H PRN  HYDROmorphone, 0.2 mg, Intravenous, Q6H PRN  naloxone, 0.04 mg, Intravenous, Q1MIN PRN  oxyCODONE, 2.5 mg, Oral, Q4H PRN   Or  oxyCODONE, 5 mg, Oral, Q4H PRN        IP CONSULT TO UROLOGY    Network Utilization Review Department  ATTENTION: Please call with any questions or concerns to [phone] and carefully listen to the prompts so that you are directed to the right person. All voicemails are confidential.   For Discharge needs, contact Care Management DC Support Team at [phone] opt. [phone]  Send all requests for admission clinical reviews, approved or denied determinations and any other requests to dedicated fax number below belonging to the campus where the patient is receiving treatment. List of dedicated fax numbers for the Facilities:  FACILITY NAME UR FAX NUMBER   ADMISSION DENIALS (Administrative/Medical Necessity) [phone]   DISCHARGE SUPPORT TEAM (NETWORK) [phone]   PARENT CHILD HEALTH (Maternity/NICU/Pediatrics) [phone]   [facility] [phone]   [facility] [phone]   [facility] [phone]   [facility] [phone]   [facility] [phone]   [facility] [phone]   [facility] [phone]   [facility] [phone]   [facility]  [facility] [phone]   Critical access hospital [phone]   [facility] [phone]   [facility] [phone]

## 2024-09-03 ENCOUNTER — TRANSITIONAL CARE MANAGEMENT (OUTPATIENT)
Dept: FAMILY MEDICINE CLINIC | Facility: CLINIC | Age: 69
End: 2024-09-03

## 2024-09-03 VITALS
BODY MASS INDEX: 28.58 KG/M2 | WEIGHT: 193 LBS | SYSTOLIC BLOOD PRESSURE: 163 MMHG | DIASTOLIC BLOOD PRESSURE: 97 MMHG | OXYGEN SATURATION: 98 % | HEIGHT: 69 IN | RESPIRATION RATE: 18 BRPM | HEART RATE: 70 BPM | TEMPERATURE: 97.6 F

## 2024-09-03 PROBLEM — R17 ELEVATED BILIRUBIN: Status: ACTIVE | Noted: 2024-09-02

## 2024-09-03 LAB
ALBUMIN SERPL BCG-MCNC: 3.7 G/DL (ref 3.5–5)
ALP SERPL-CCNC: 38 U/L (ref 34–104)
ALT SERPL W P-5'-P-CCNC: 17 U/L (ref 7–52)
ANION GAP SERPL CALCULATED.3IONS-SCNC: 6 MMOL/L (ref 4–13)
AST SERPL W P-5'-P-CCNC: 16 U/L (ref 13–39)
BASOPHILS # BLD AUTO: 0.04 THOUSANDS/ÂΜL (ref 0–0.1)
BASOPHILS NFR BLD AUTO: 1 % (ref 0–1)
BILIRUB DIRECT SERPL-MCNC: 0.18 MG/DL (ref 0–0.2)
BILIRUB SERPL-MCNC: 1.19 MG/DL (ref 0.2–1)
BUN SERPL-MCNC: 20 MG/DL (ref 5–25)
CALCIUM SERPL-MCNC: 9 MG/DL (ref 8.4–10.2)
CHLORIDE SERPL-SCNC: 107 MMOL/L (ref 96–108)
CO2 SERPL-SCNC: 24 MMOL/L (ref 21–32)
CREAT SERPL-MCNC: 1.33 MG/DL (ref 0.6–1.3)
EOSINOPHIL # BLD AUTO: 0.11 THOUSAND/ÂΜL (ref 0–0.61)
EOSINOPHIL NFR BLD AUTO: 2 % (ref 0–6)
ERYTHROCYTE [DISTWIDTH] IN BLOOD BY AUTOMATED COUNT: 13 % (ref 11.6–15.1)
GFR SERPL CREATININE-BSD FRML MDRD: 53 ML/MIN/1.73SQ M
GLUCOSE SERPL-MCNC: 107 MG/DL (ref 65–140)
HCT VFR BLD AUTO: 43 % (ref 36.5–49.3)
HGB BLD-MCNC: 15.4 G/DL (ref 12–17)
IMM GRANULOCYTES # BLD AUTO: 0.02 THOUSAND/UL (ref 0–0.2)
IMM GRANULOCYTES NFR BLD AUTO: 0 % (ref 0–2)
LYMPHOCYTES # BLD AUTO: 1.22 THOUSANDS/ÂΜL (ref 0.6–4.47)
LYMPHOCYTES NFR BLD AUTO: 22 % (ref 14–44)
MAGNESIUM SERPL-MCNC: 2 MG/DL (ref 1.9–2.7)
MCH RBC QN AUTO: 30.1 PG (ref 26.8–34.3)
MCHC RBC AUTO-ENTMCNC: 35.8 G/DL (ref 31.4–37.4)
MCV RBC AUTO: 84 FL (ref 82–98)
MONOCYTES # BLD AUTO: 0.45 THOUSAND/ÂΜL (ref 0.17–1.22)
MONOCYTES NFR BLD AUTO: 8 % (ref 4–12)
NEUTROPHILS # BLD AUTO: 3.69 THOUSANDS/ÂΜL (ref 1.85–7.62)
NEUTS SEG NFR BLD AUTO: 67 % (ref 43–75)
NRBC BLD AUTO-RTO: 0 /100 WBCS
PLATELET # BLD AUTO: 148 THOUSANDS/UL (ref 149–390)
PMV BLD AUTO: 9.1 FL (ref 8.9–12.7)
POTASSIUM SERPL-SCNC: 4.1 MMOL/L (ref 3.5–5.3)
PROT SERPL-MCNC: 5.4 G/DL (ref 6.4–8.4)
RBC # BLD AUTO: 5.12 MILLION/UL (ref 3.88–5.62)
SODIUM SERPL-SCNC: 137 MMOL/L (ref 135–147)
WBC # BLD AUTO: 5.53 THOUSAND/UL (ref 4.31–10.16)

## 2024-09-03 PROCEDURE — 85025 COMPLETE CBC W/AUTO DIFF WBC: CPT | Performed by: STUDENT IN AN ORGANIZED HEALTH CARE EDUCATION/TRAINING PROGRAM

## 2024-09-03 PROCEDURE — 99239 HOSP IP/OBS DSCHRG MGMT >30: CPT

## 2024-09-03 PROCEDURE — 83735 ASSAY OF MAGNESIUM: CPT | Performed by: STUDENT IN AN ORGANIZED HEALTH CARE EDUCATION/TRAINING PROGRAM

## 2024-09-03 PROCEDURE — 80076 HEPATIC FUNCTION PANEL: CPT | Performed by: STUDENT IN AN ORGANIZED HEALTH CARE EDUCATION/TRAINING PROGRAM

## 2024-09-03 PROCEDURE — 80048 BASIC METABOLIC PNL TOTAL CA: CPT | Performed by: STUDENT IN AN ORGANIZED HEALTH CARE EDUCATION/TRAINING PROGRAM

## 2024-09-03 RX ORDER — LISINOPRIL 5 MG/1
5 TABLET ORAL DAILY
Qty: 90 TABLET | Refills: 0 | Status: SHIPPED | OUTPATIENT
Start: 2024-09-05

## 2024-09-03 RX ORDER — TAMSULOSIN HYDROCHLORIDE 0.4 MG/1
0.4 CAPSULE ORAL
Qty: 14 CAPSULE | Refills: 0 | Status: SHIPPED | OUTPATIENT
Start: 2024-09-03 | End: 2024-09-11 | Stop reason: SDUPTHER

## 2024-09-03 RX ADMIN — EZETIMIBE 10 MG: 10 TABLET ORAL at 08:30

## 2024-09-03 RX ADMIN — DOCUSATE SODIUM 100 MG: 100 CAPSULE, LIQUID FILLED ORAL at 08:30

## 2024-09-03 RX ADMIN — METOPROLOL SUCCINATE 50 MG: 50 TABLET, EXTENDED RELEASE ORAL at 08:30

## 2024-09-03 RX ADMIN — ASPIRIN 81 MG CHEWABLE TABLET 81 MG: 81 TABLET CHEWABLE at 08:30

## 2024-09-03 RX ADMIN — ENOXAPARIN SODIUM 40 MG: 40 INJECTION SUBCUTANEOUS at 08:30

## 2024-09-03 RX ADMIN — B-COMPLEX W/ C & FOLIC ACID TAB 1 TABLET: TAB at 08:30

## 2024-09-03 NOTE — NURSING NOTE
Patient was discharged to home. Patient walked and was escorted to lobby by patient friend and RN. Patient collected all belongings prior to discharge. RN educated and explained AVS to patient and patient questions were answered verbalized understanding of AVS.

## 2024-09-03 NOTE — ASSESSMENT & PLAN NOTE
POA left-lower abdominal pain that wraps around into the groin started 24 hours ago.  Denies L CVA tenderness, dysuria or hematuria  CT of the abdomen pelvis with contrast: 7 mm obstructing proximal left ureteral calculus with mild left hydronephrosis.   UA positive for blood      Case was discussed with urology team and they recommended the following;  Start Flomax  Outpatient follow-up for cystoscopy plan.  Ambulatory referral to urology

## 2024-09-03 NOTE — PLAN OF CARE

## 2024-09-03 NOTE — ASSESSMENT & PLAN NOTE
Blood pressure stable  Home medication: Lisinopril 5 mg on hold in setting of ANIVAL   I advised the patient to restart lisinopril next 4872 hours  Recheck BMP in 5 to 7 days and follow-up with PCP for results discussion.  Start Flomax per urology recommendations  Continue metoprolol

## 2024-09-03 NOTE — PLAN OF CARE

## 2024-09-03 NOTE — ASSESSMENT & PLAN NOTE
CAD SP MI in 1999, SP's PCA M LAD on 5/12/1999 and SP CABG 2009 x 3  Follows with [facility] cardiology  Home medication: Aspirin 81 mg, metoprolol 50 mg, rosuvastatin 40 mg switched to Lipitor 80 mg daily continue

## 2024-09-03 NOTE — ASSESSMENT & PLAN NOTE
POA  with left lower abdominal pain with lipase elevated 196  AST 19, ALT 24, & ALk Phos 44  Bilirubin trended down from 1.32-1.19.  Most probably this is chronic.  CT a/p w/o contrast large gallstone noted. No pericholecystic inflammatory change.   Negative Newell sign  Tolerating oral diet  Stooling and voiding accordingly  Follow-up with PCP and labs in 5 to 7 days

## 2024-09-03 NOTE — ASSESSMENT & PLAN NOTE
On admission creatinine: 1.84, baseline 0.9-1.03  Suspect post renal secondary to obstruction  Today creatinine is 1.33.  ANIVAL is resolving  Patient is agreeable to recheck BMP in 5 to 7 days and follow-up with PCP in the next 5 to 7 days.  Follow-up with urology next 3 to 7 days

## 2024-09-03 NOTE — DISCHARGE INSTR - AVS FIRST PAGE
Please make sure to hold lisinopril for the next 2 days.  Recheck blood test in 5 to 7 days  Please make sure to follow-up with urology and family doctor in the next 7 days.  Should the pain progress or new symptoms develop please report to the emergency department.

## 2024-09-03 NOTE — DISCHARGE SUMMARY
[facility]  Discharge- Jordin Figueroa 1955, 69 y.o. male MRN: [id number]  Unit/Bed#: [address] [place] [address] Encounter: [id number]  Primary Care Provider: Samir Jacome DO   Date and time admitted to hospital: 9/1/2024  8:10 PM    Thrombocytopenia (HCC)  Assessment & Plan  Most probably transient secondary to IV fluids      Elevated bilirubin  Assessment & Plan  POA  with left lower abdominal pain with lipase elevated 196  AST 19, ALT 24, & ALk Phos 44  Bilirubin trended down from 1.32-1.19.  Most probably this is chronic.  CT a/p w/o contrast large gallstone noted. No pericholecystic inflammatory change.   Negative Newell sign  Tolerating oral diet  Stooling and voiding accordingly  Follow-up with PCP and labs in 5 to 7 days    ANIVAL (acute kidney injury) (HCC)  Assessment & Plan  On admission creatinine: 1.84, baseline 0.9-1.03  Suspect post renal secondary to obstruction  Today creatinine is 1.33.  ANIVAL is resolving  Patient is agreeable to recheck BMP in 5 to 7 days and follow-up with PCP in the next 5 to 7 days.  Follow-up with urology next 3 to 7 days    Primary hypertension  Assessment & Plan  Blood pressure stable  Home medication: Lisinopril 5 mg on hold in setting of ANIVAL   I advised the patient to restart lisinopril next 4872 hours  Recheck BMP in 5 to 7 days and follow-up with PCP for results discussion.  Start Flomax per urology recommendations  Continue metoprolol    Coronary artery disease involving native coronary artery  Assessment & Plan  CAD SP MI in 1999, SP's PCA M LAD on 5/12/1999 and SP CABG 2009 x 3  Follows with [facility] cardiology  Home medication: Aspirin 81 mg, metoprolol 50 mg, rosuvastatin 40 mg switched to Lipitor 80 mg daily continue    * Left ureteral calculus  Assessment & Plan  POA left-lower abdominal pain that wraps around into the groin started 24 hours ago.  Denies L CVA tenderness, dysuria or hematuria  CT of the abdomen pelvis with contrast: 7 mm  obstructing proximal left ureteral calculus with mild left hydronephrosis.   UA positive for blood      Case was discussed with urology team and they recommended the following;  Start Flomax  Outpatient follow-up for cystoscopy plan.  Ambulatory referral to urology        Medical Problems       Resolved Problems  Date Reviewed: 9/3/2024   None       Discharging Physician / Practitioner: Chuy Lopez MD  PCP: Samir Jacome DO  Admission Date:   Admission Orders (From admission, onward)       Ordered        09/02/24 0817  INPATIENT ADMISSION  Once            09/01/24 2331  Place in Observation  Once                          Discharge Date: 09/03/24    Consultations During Hospital Stay:  Urology     Procedures Performed:   CT Abdomen pelvis with contrast   Final Result      7 mm obstructing left ureteral calculus with mild left hydronephrosis.         Workstation performed: [id number]               Significant Findings / Test Results:   Results for orders placed or performed during the hospital encounter of 09/01/24   CBC and differential   Result Value Ref Range    WBC 8.28 4.31 - 10.16 Thousand/uL    RBC 5.61 3.88 - 5.62 Million/uL    Hemoglobin 16.7 12.0 - 17.0 g/dL    Hematocrit 46.9 36.5 - 49.3 %    MCV 84 82 - 98 fL    MCH 29.8 26.8 - 34.3 pg    MCHC 35.6 31.4 - 37.4 g/dL    RDW 13.2 11.6 - 15.1 %    MPV 9.2 8.9 - 12.7 fL    Platelets 158 149 - 390 Thousands/uL    nRBC 0 /100 WBCs    Segmented % 76 (H) 43 - 75 %    Immature Grans % 0 0 - 2 %    Lymphocytes % 15 14 - 44 %    Monocytes % 8 4 - 12 %    Eosinophils Relative 1 0 - 6 %    Basophils Relative 0 0 - 1 %    Absolute Neutrophils 6.23 1.85 - 7.62 Thousands/µL    Absolute Immature Grans 0.01 0.00 - 0.20 Thousand/uL    Absolute Lymphocytes 1.23 0.60 - 4.47 Thousands/µL    Absolute Monocytes 0.67 0.17 - 1.22 Thousand/µL    Eosinophils Absolute 0.11 0.00 - 0.61 Thousand/µL    Basophils Absolute 0.03 0.00 - 0.10 Thousands/µL   CMP   Result Value Ref Range     Sodium 136 135 - 147 mmol/L    Potassium 4.2 3.5 - 5.3 mmol/L    Chloride 101 96 - 108 mmol/L    CO2 29 21 - 32 mmol/L    ANION GAP 6 4 - 13 mmol/L    BUN 21 5 - 25 mg/dL    Creatinine 1.84 (H) 0.60 - 1.30 mg/dL    Glucose 105 65 - 140 mg/dL    Calcium 9.4 8.4 - 10.2 mg/dL    AST 19 13 - 39 U/L    ALT 24 7 - 52 U/L    Alkaline Phosphatase 44 34 - 104 U/L    Total Protein 6.5 6.4 - 8.4 g/dL    Albumin 4.2 3.5 - 5.0 g/dL    Total Bilirubin 1.32 (H) 0.20 - 1.00 mg/dL    eGFR 36 ml/min/1.73sq m   Lipase   Result Value Ref Range    Lipase 196 (H) 11 - 82 u/L   UA w Reflex to Microscopic w Reflex to Culture    Specimen: Urine, Clean Catch   Result Value Ref Range    Color, UA Light Yellow     Clarity, UA Clear     Specific Gravity, UA 1.015 1.005 - 1.030    pH, UA 6.5 4.5, 5.0, 5.5, 6.0, 6.5, 7.0, 7.5, 8.0    Leukocytes, UA Negative Negative    Nitrite, UA Negative Negative    Protein, UA Negative Negative mg/dl    Glucose, UA Negative Negative mg/dl    Ketones, UA Negative Negative mg/dl    Urobilinogen, UA <2.0 <2.0 mg/dl mg/dl    Bilirubin, UA Negative Negative    Occult Blood, UA Small (A) Negative   Urine Microscopic   Result Value Ref Range    RBC, UA 0-5 None Seen, 0-1, 1-2, 2-4, 0-5 /hpf    WBC, UA None Seen None Seen, 0-1, 1-2, 0-5, 2-4 /hpf    Epithelial Cells Occasional None Seen, Occasional /hpf    Bacteria, UA None Seen None Seen, Occasional /hpf   Basic metabolic panel   Result Value Ref Range    Sodium 134 (L) 135 - 147 mmol/L    Potassium 4.1 3.5 - 5.3 mmol/L    Chloride 101 96 - 108 mmol/L    CO2 25 21 - 32 mmol/L    ANION GAP 8 4 - 13 mmol/L    BUN 21 5 - 25 mg/dL    Creatinine 1.57 (H) 0.60 - 1.30 mg/dL    Glucose 103 65 - 140 mg/dL    Calcium 8.8 8.4 - 10.2 mg/dL    eGFR 44 ml/min/1.73sq m   Magnesium   Result Value Ref Range    Magnesium 2.3 1.9 - 2.7 mg/dL   CBC and differential   Result Value Ref Range    WBC 5.64 4.31 - 10.16 Thousand/uL    RBC 5.20 3.88 - 5.62 Million/uL    Hemoglobin 15.5 12.0  - 17.0 g/dL    Hematocrit 43.7 36.5 - 49.3 %    MCV 84 82 - 98 fL    MCH 29.8 26.8 - 34.3 pg    MCHC 35.5 31.4 - 37.4 g/dL    RDW 13.2 11.6 - 15.1 %    MPV 9.5 8.9 - 12.7 fL    Platelets 144 (L) 149 - 390 Thousands/uL    nRBC 0 /100 WBCs    Segmented % 63 43 - 75 %    Immature Grans % 0 0 - 2 %    Lymphocytes % 25 14 - 44 %    Monocytes % 9 4 - 12 %    Eosinophils Relative 2 0 - 6 %    Basophils Relative 1 0 - 1 %    Absolute Neutrophils 3.53 1.85 - 7.62 Thousands/µL    Absolute Immature Grans 0.01 0.00 - 0.20 Thousand/uL    Absolute Lymphocytes 1.43 0.60 - 4.47 Thousands/µL    Absolute Monocytes 0.52 0.17 - 1.22 Thousand/µL    Eosinophils Absolute 0.12 0.00 - 0.61 Thousand/µL    Basophils Absolute 0.03 0.00 - 0.10 Thousands/µL   Bilirubin, direct   Result Value Ref Range    Bilirubin, Direct 0.26 (H) 0.00 - 0.20 mg/dL   Lipase   Result Value Ref Range    Lipase 17 11 - 82 u/L   Basic metabolic panel   Result Value Ref Range    Sodium 137 135 - 147 mmol/L    Potassium 4.1 3.5 - 5.3 mmol/L    Chloride 107 96 - 108 mmol/L    CO2 24 21 - 32 mmol/L    ANION GAP 6 4 - 13 mmol/L    BUN 20 5 - 25 mg/dL    Creatinine 1.33 (H) 0.60 - 1.30 mg/dL    Glucose 107 65 - 140 mg/dL    Calcium 9.0 8.4 - 10.2 mg/dL    eGFR 53 ml/min/1.73sq m   CBC and differential   Result Value Ref Range    WBC 5.53 4.31 - 10.16 Thousand/uL    RBC 5.12 3.88 - 5.62 Million/uL    Hemoglobin 15.4 12.0 - 17.0 g/dL    Hematocrit 43.0 36.5 - 49.3 %    MCV 84 82 - 98 fL    MCH 30.1 26.8 - 34.3 pg    MCHC 35.8 31.4 - 37.4 g/dL    RDW 13.0 11.6 - 15.1 %    MPV 9.1 8.9 - 12.7 fL    Platelets 148 (L) 149 - 390 Thousands/uL    nRBC 0 /100 WBCs    Segmented % 67 43 - 75 %    Immature Grans % 0 0 - 2 %    Lymphocytes % 22 14 - 44 %    Monocytes % 8 4 - 12 %    Eosinophils Relative 2 0 - 6 %    Basophils Relative 1 0 - 1 %    Absolute Neutrophils 3.69 1.85 - 7.62 Thousands/µL    Absolute Immature Grans 0.02 0.00 - 0.20 Thousand/uL    Absolute Lymphocytes 1.22 0.60  - 4.47 Thousands/µL    Absolute Monocytes 0.45 0.17 - 1.22 Thousand/µL    Eosinophils Absolute 0.11 0.00 - 0.61 Thousand/µL    Basophils Absolute 0.04 0.00 - 0.10 Thousands/µL   Magnesium   Result Value Ref Range    Magnesium 2.0 1.9 - 2.7 mg/dL   Hepatic function panel   Result Value Ref Range    Total Bilirubin 1.19 (H) 0.20 - 1.00 mg/dL    Bilirubin, Direct 0.18 0.00 - 0.20 mg/dL    Alkaline Phosphatase 38 34 - 104 U/L    AST 16 13 - 39 U/L    ALT 17 7 - 52 U/L    Total Protein 5.4 (L) 6.4 - 8.4 g/dL    Albumin 3.7 3.5 - 5.0 g/dL          Incidental Findings:   Nne    I reviewed the above mentioned incidental findings with the patient and/or family and they expressed understanding.    Test Results Pending at Discharge (will require follow up):   None      Outpatient Tests Requested:  CMP in 5-7 days     Complications:  none     Reason for Admission: Nephrolithiasis with ANIVAL    Hospital Course:   Jordin Figueroa is a 69 y.o. male patient who originally presented to the hospital on 9/1/2024 due to abdominal pain and was found to have a kidney stone with ANIVAL.  During the hospitalization urology team are consulted and recommended Flomax and outpatient completion of cystoscopy with outpatient follow-up.  Case was discussed with urology team and they recommended the plan above with no further plans at this time.  Creatinine trended down from 0.8-1.3.  His baseline is 0.9-1.1.  Patient is agreeable to go home and will repeat CMP in 5 to 7 days and follow-up with PCP.  Educated the patient about importance to report to ED should symptoms progress or new symptoms develop.    Hospital Course: No notes on file        Please see above list of diagnoses and related plan for additional information.     Condition at Discharge: good    Discharge Day Visit / Exam:   Subjective: Patient was seen today at bedside.  Does not have any active complaints.  No chest pain or tightness, SOB or cough, dizziness or light headedness, N/V,  "Diarrhea of constipation.   No active urinary symptoms  Tolerating oral diet.     Vitals: Blood Pressure: 163/97 (09/03/24 0831)  Pulse: 70 (09/02/24 2200)  Temperature: 97.6 °F (36.4 °C) (09/03/24 0831)  Temp Source: Oral (09/02/24 2200)  Respirations: 18 (09/02/24 2200)  Height: 5' 9\" (175.3 cm) (09/02/24 0037)  Weight - Scale: 87.5 kg (193 lb) (09/03/24 0600)  SpO2: 98 % (09/02/24 2200)  Exam:   Physical Exam  Vitals and nursing note reviewed.   Constitutional:       General: He is not in acute distress.     Appearance: Normal appearance.   HENT:      Head: Normocephalic and atraumatic.      Mouth/Throat:      Mouth: Mucous membranes are moist.   Eyes:      Conjunctiva/sclera: Conjunctivae normal.      Pupils: Pupils are equal, round, and reactive to light.   Cardiovascular:      Rate and Rhythm: Normal rate and regular rhythm.      Pulses: Normal pulses.           Carotid pulses are 2+ on the right side and 2+ on the left side.       Radial pulses are 2+ on the right side and 2+ on the left side.        Dorsalis pedis pulses are 2+ on the right side and 2+ on the left side.      Heart sounds: Normal heart sounds, S1 normal and S2 normal. No murmur heard.  Pulmonary:      Effort: No tachypnea, bradypnea or accessory muscle usage.      Breath sounds: Normal breath sounds and air entry. No decreased breath sounds, wheezing, rhonchi or rales.   Abdominal:      General: Abdomen is flat. Bowel sounds are normal. There is no distension.      Palpations: Abdomen is soft.      Tenderness: There is no abdominal tenderness. There is no guarding or rebound.   Musculoskeletal:      Cervical back: Normal range of motion.      Right lower leg: No edema.      Left lower leg: No edema.   Skin:     Capillary Refill: Capillary refill takes less than 2 seconds.   Neurological:      Mental Status: He is alert and oriented to person, place, and time. Mental status is at baseline.   Psychiatric:         Mood and Affect: Mood normal.   "       Behavior: Behavior normal.          Discussion with Family:  patient .     Discharge instructions/Information to patient and family:   See after visit summary for information provided to patient and family.      Provisions for Follow-Up Care:  See after visit summary for information related to follow-up care and any pertinent home health orders.      Mobility at time of Discharge:   Basic Mobility Inpatient Raw Score: 24  JH-HLM Goal: 8: Walk 250 feet or more  JH-HLM Achieved: 7: Walk 25 feet or more  HLM Goal achieved. Continue to encourage appropriate mobility.     Disposition:   Home    Planned Readmission: none      Discharge Statement:  I spent 45 minutes discharging the patient. This time was spent on the day of discharge. I had direct contact with the patient on the day of discharge. Greater than 50% of the total time was spent examining patient, answering all patient questions, arranging and discussing plan of care with patient as well as directly providing post-discharge instructions.  Additional time then spent on discharge activities.    Discharge Medications:  See after visit summary for reconciled discharge medications provided to patient and/or family.      **Please Note: This note may have been constructed using a voice recognition system**

## 2024-09-04 ENCOUNTER — HOSPITAL ENCOUNTER (INPATIENT)
Facility: HOSPITAL | Age: 69
LOS: 1 days | Discharge: HOME | DRG: 661 | End: 2024-09-06
Attending: EMERGENCY MEDICINE | Admitting: INTERNAL MEDICINE
Payer: COMMERCIAL

## 2024-09-04 ENCOUNTER — ANESTHESIA EVENT (OUTPATIENT)
Dept: PERIOP | Facility: HOSPITAL | Age: 69
DRG: 661 | End: 2024-09-04
Payer: COMMERCIAL

## 2024-09-04 DIAGNOSIS — N20.1 LEFT URETERAL CALCULUS: ICD-10-CM

## 2024-09-04 DIAGNOSIS — N20.0 KIDNEY STONE: Primary | ICD-10-CM

## 2024-09-04 PROBLEM — Z98.61 HISTORY OF PTCA: Status: ACTIVE | Noted: 2024-09-04

## 2024-09-04 LAB
2HR DELTA HS TROPONIN: 0 NG/L
ALBUMIN SERPL BCG-MCNC: 4.4 G/DL (ref 3.5–5)
ALP SERPL-CCNC: 45 U/L (ref 34–104)
ALT SERPL W P-5'-P-CCNC: 24 U/L (ref 7–52)
ANION GAP SERPL CALCULATED.3IONS-SCNC: 9 MMOL/L (ref 4–13)
AST SERPL W P-5'-P-CCNC: 23 U/L (ref 13–39)
BACTERIA UR QL AUTO: NORMAL /HPF
BASOPHILS # BLD AUTO: 0.04 THOUSANDS/ÂΜL (ref 0–0.1)
BASOPHILS NFR BLD AUTO: 0 % (ref 0–1)
BILIRUB SERPL-MCNC: 1.09 MG/DL (ref 0.2–1)
BILIRUB UR QL STRIP: NEGATIVE
BUN SERPL-MCNC: 16 MG/DL (ref 5–25)
CALCIUM SERPL-MCNC: 9.5 MG/DL (ref 8.4–10.2)
CARDIAC TROPONIN I PNL SERPL HS: 3 NG/L
CARDIAC TROPONIN I PNL SERPL HS: 3 NG/L
CHLORIDE SERPL-SCNC: 100 MMOL/L (ref 96–108)
CLARITY UR: CLEAR
CO2 SERPL-SCNC: 25 MMOL/L (ref 21–32)
COLOR UR: COLORLESS
CREAT SERPL-MCNC: 1.39 MG/DL (ref 0.6–1.3)
EOSINOPHIL # BLD AUTO: 0.07 THOUSAND/ÂΜL (ref 0–0.61)
EOSINOPHIL NFR BLD AUTO: 1 % (ref 0–6)
ERYTHROCYTE [DISTWIDTH] IN BLOOD BY AUTOMATED COUNT: 12.9 % (ref 11.6–15.1)
GFR SERPL CREATININE-BSD FRML MDRD: 50 ML/MIN/1.73SQ M
GLUCOSE SERPL-MCNC: 116 MG/DL (ref 65–140)
GLUCOSE UR STRIP-MCNC: NEGATIVE MG/DL
HCT VFR BLD AUTO: 45.3 % (ref 36.5–49.3)
HGB BLD-MCNC: 16.5 G/DL (ref 12–17)
HGB UR QL STRIP.AUTO: ABNORMAL
IMM GRANULOCYTES # BLD AUTO: 0.03 THOUSAND/UL (ref 0–0.2)
IMM GRANULOCYTES NFR BLD AUTO: 0 % (ref 0–2)
KETONES UR STRIP-MCNC: NEGATIVE MG/DL
LEUKOCYTE ESTERASE UR QL STRIP: NEGATIVE
LYMPHOCYTES # BLD AUTO: 1.37 THOUSANDS/ÂΜL (ref 0.6–4.47)
LYMPHOCYTES NFR BLD AUTO: 15 % (ref 14–44)
MCH RBC QN AUTO: 30 PG (ref 26.8–34.3)
MCHC RBC AUTO-ENTMCNC: 36.4 G/DL (ref 31.4–37.4)
MCV RBC AUTO: 82 FL (ref 82–98)
MONOCYTES # BLD AUTO: 0.69 THOUSAND/ÂΜL (ref 0.17–1.22)
MONOCYTES NFR BLD AUTO: 8 % (ref 4–12)
NEUTROPHILS # BLD AUTO: 6.71 THOUSANDS/ÂΜL (ref 1.85–7.62)
NEUTS SEG NFR BLD AUTO: 76 % (ref 43–75)
NITRITE UR QL STRIP: NEGATIVE
NON-SQ EPI CELLS URNS QL MICRO: NORMAL /HPF
NRBC BLD AUTO-RTO: 0 /100 WBCS
PH UR STRIP.AUTO: 6 [PH]
PLATELET # BLD AUTO: 152 THOUSANDS/UL (ref 149–390)
PMV BLD AUTO: 9 FL (ref 8.9–12.7)
POTASSIUM SERPL-SCNC: 3.9 MMOL/L (ref 3.5–5.3)
PROT SERPL-MCNC: 6.9 G/DL (ref 6.4–8.4)
PROT UR STRIP-MCNC: NEGATIVE MG/DL
RBC # BLD AUTO: 5.5 MILLION/UL (ref 3.88–5.62)
RBC #/AREA URNS AUTO: NORMAL /HPF
SODIUM SERPL-SCNC: 134 MMOL/L (ref 135–147)
SP GR UR STRIP.AUTO: 1.01 (ref 1–1.03)
UROBILINOGEN UR STRIP-ACNC: <2 MG/DL
WBC # BLD AUTO: 8.91 THOUSAND/UL (ref 4.31–10.16)
WBC #/AREA URNS AUTO: NORMAL /HPF

## 2024-09-04 PROCEDURE — 99222 1ST HOSP IP/OBS MODERATE 55: CPT | Performed by: NURSE PRACTITIONER

## 2024-09-04 PROCEDURE — 84484 ASSAY OF TROPONIN QUANT: CPT | Performed by: NURSE PRACTITIONER

## 2024-09-04 PROCEDURE — 36415 COLL VENOUS BLD VENIPUNCTURE: CPT | Performed by: EMERGENCY MEDICINE

## 2024-09-04 PROCEDURE — 80053 COMPREHEN METABOLIC PANEL: CPT | Performed by: EMERGENCY MEDICINE

## 2024-09-04 PROCEDURE — 99285 EMERGENCY DEPT VISIT HI MDM: CPT | Performed by: EMERGENCY MEDICINE

## 2024-09-04 PROCEDURE — 85025 COMPLETE CBC W/AUTO DIFF WBC: CPT | Performed by: EMERGENCY MEDICINE

## 2024-09-04 PROCEDURE — 96361 HYDRATE IV INFUSION ADD-ON: CPT

## 2024-09-04 PROCEDURE — 84484 ASSAY OF TROPONIN QUANT: CPT | Performed by: EMERGENCY MEDICINE

## 2024-09-04 PROCEDURE — 99284 EMERGENCY DEPT VISIT MOD MDM: CPT

## 2024-09-04 PROCEDURE — 81001 URINALYSIS AUTO W/SCOPE: CPT | Performed by: EMERGENCY MEDICINE

## 2024-09-04 PROCEDURE — 96375 TX/PRO/DX INJ NEW DRUG ADDON: CPT

## 2024-09-04 PROCEDURE — 96374 THER/PROPH/DIAG INJ IV PUSH: CPT

## 2024-09-04 PROCEDURE — 93005 ELECTROCARDIOGRAM TRACING: CPT

## 2024-09-04 RX ORDER — ONDANSETRON 2 MG/ML
4 INJECTION INTRAMUSCULAR; INTRAVENOUS EVERY 6 HOURS PRN
Status: DISCONTINUED | OUTPATIENT
Start: 2024-09-04 | End: 2024-09-06 | Stop reason: HOSPADM

## 2024-09-04 RX ORDER — ACETAMINOPHEN 325 MG/1
650 TABLET ORAL EVERY 6 HOURS PRN
Status: DISCONTINUED | OUTPATIENT
Start: 2024-09-04 | End: 2024-09-06 | Stop reason: HOSPADM

## 2024-09-04 RX ORDER — ATORVASTATIN CALCIUM 80 MG/1
80 TABLET, FILM COATED ORAL
Status: DISCONTINUED | OUTPATIENT
Start: 2024-09-05 | End: 2024-09-06 | Stop reason: HOSPADM

## 2024-09-04 RX ORDER — ONDANSETRON 2 MG/ML
4 INJECTION INTRAMUSCULAR; INTRAVENOUS ONCE
Status: COMPLETED | OUTPATIENT
Start: 2024-09-04 | End: 2024-09-04

## 2024-09-04 RX ORDER — HYDROMORPHONE HCL IN WATER/PF 6 MG/30 ML
0.2 PATIENT CONTROLLED ANALGESIA SYRINGE INTRAVENOUS
Status: DISCONTINUED | OUTPATIENT
Start: 2024-09-04 | End: 2024-09-06 | Stop reason: HOSPADM

## 2024-09-04 RX ORDER — HYDRALAZINE HYDROCHLORIDE 10 MG/1
10 TABLET, FILM COATED ORAL EVERY 6 HOURS PRN
Status: DISCONTINUED | OUTPATIENT
Start: 2024-09-04 | End: 2024-09-04

## 2024-09-04 RX ORDER — METOPROLOL SUCCINATE 50 MG/1
50 TABLET, EXTENDED RELEASE ORAL DAILY
Status: DISCONTINUED | OUTPATIENT
Start: 2024-09-05 | End: 2024-09-06 | Stop reason: HOSPADM

## 2024-09-04 RX ORDER — TAMSULOSIN HYDROCHLORIDE 0.4 MG/1
0.4 CAPSULE ORAL
Status: DISCONTINUED | OUTPATIENT
Start: 2024-09-05 | End: 2024-09-06 | Stop reason: HOSPADM

## 2024-09-04 RX ORDER — LABETALOL HYDROCHLORIDE 5 MG/ML
10 INJECTION, SOLUTION INTRAVENOUS EVERY 4 HOURS PRN
Status: DISCONTINUED | OUTPATIENT
Start: 2024-09-04 | End: 2024-09-06 | Stop reason: HOSPADM

## 2024-09-04 RX ORDER — EZETIMIBE 10 MG/1
10 TABLET ORAL DAILY
Status: DISCONTINUED | OUTPATIENT
Start: 2024-09-05 | End: 2024-09-06 | Stop reason: HOSPADM

## 2024-09-04 RX ORDER — SODIUM CHLORIDE 9 MG/ML
100 INJECTION, SOLUTION INTRAVENOUS CONTINUOUS
Status: DISCONTINUED | OUTPATIENT
Start: 2024-09-04 | End: 2024-09-06 | Stop reason: HOSPADM

## 2024-09-04 RX ORDER — LANOLIN ALCOHOL/MO/W.PET/CERES
3 CREAM (GRAM) TOPICAL
Status: DISCONTINUED | OUTPATIENT
Start: 2024-09-04 | End: 2024-09-06 | Stop reason: HOSPADM

## 2024-09-04 RX ORDER — HYDROMORPHONE HCL/PF 1 MG/ML
0.5 SYRINGE (ML) INJECTION ONCE
Status: COMPLETED | OUTPATIENT
Start: 2024-09-04 | End: 2024-09-04

## 2024-09-04 RX ADMIN — Medication 3 MG: at 23:21

## 2024-09-04 RX ADMIN — HYDROMORPHONE HYDROCHLORIDE 0.5 MG: 1 INJECTION, SOLUTION INTRAMUSCULAR; INTRAVENOUS; SUBCUTANEOUS at 19:36

## 2024-09-04 RX ADMIN — SODIUM CHLORIDE 100 ML/HR: 0.9 INJECTION, SOLUTION INTRAVENOUS at 22:41

## 2024-09-04 RX ADMIN — SODIUM CHLORIDE 1000 ML: 0.9 INJECTION, SOLUTION INTRAVENOUS at 19:39

## 2024-09-04 RX ADMIN — ONDANSETRON 4 MG: 2 INJECTION INTRAMUSCULAR; INTRAVENOUS at 19:36

## 2024-09-04 RX ADMIN — ACETAMINOPHEN 650 MG: 325 TABLET ORAL at 23:11

## 2024-09-04 NOTE — ED PROVIDER NOTES
History  Chief Complaint   Patient presents with    Dizziness     Reported that his BP has been elevated today and have some dizziness, tingling to BLUes, denies any focal weakness, will have procedure to remove kidney stone tmr with DR Gunderson.     Patient has a history of a 7 mm kidney stone.  Was recently discharged pain is uncontrolled at home.  Patient was not giving any pain medication at discharge.  Referred back to the ER by Dr. Gunderson.  Patient is on the schedule tomorrow morning for cystoscopy to address the kidney stone.  Patient reports his blood pressure has been high at home and this is likely secondary to his pain.      History provided by:  Patient   used: No    Dizziness      Prior to Admission Medications   Prescriptions Last Dose Informant Patient Reported? Taking?   Ascorbic Acid (Vitamin C) 500 MG CAPS  Self Yes No   Sig: once a week On Saturday   Echinacea 500 MG CAPS  Self Yes No   Sig: once a week Saturday   Ginkgo Biloba 120 MG CAPS  Self Yes No   Sig: once a week Saturday   Multiple Vitamins-Minerals (CENTRUM SILVER 50+MEN PO) 9/4/2024 at 0800 Self Yes Yes   Sig: every morning   Omega-3 Fatty Acids (OMEGA 3 PO)  Self Yes No   Sig: once a week On Saturday   aspirin (ECOTRIN LOW STRENGTH) 81 mg EC tablet 9/4/2024 at 0800 Self Yes Yes   Sig: Take 81 mg by mouth every morning   ezetimibe (ZETIA) 10 mg tablet 9/4/2024 at 0800  No Yes   Sig: Take 1 tablet (10 mg total) by mouth daily   lisinopril (ZESTRIL) 5 mg tablet   No No   Sig: Take 1 tablet (5 mg total) by mouth daily Do not start before September 5, 2024.   metoprolol succinate (TOPROL-XL) 50 mg 24 hr tablet 9/4/2024 at 0800  No Yes   Sig: Take 1 tablet (50 mg total) by mouth daily   rosuvastatin (CRESTOR) 40 MG tablet 9/4/2024 at 0800  No Yes   Sig: Take 1 tablet (40 mg total) by mouth every morning   tamsulosin (FLOMAX) 0.4 mg 9/4/2024  No Yes   Sig: Take 1 capsule (0.4 mg total) by mouth daily with dinner for 14  days   vitamin B-12 (VITAMIN B-12) 1,000 mcg tablet  Self Yes No   Sig: once a week On Saturday      Facility-Administered Medications: None       Past Medical History:   Diagnosis Date    ANIVLA (acute kidney injury) (HCC) 09/02/2024    Coronary artery disease     HTN (hypertension)     Migraine     Myocardial infarction (HCC) 1999    one stent       Past Surgical History:   Procedure Laterality Date    ANGIOPLASTY  1999    one stent    COLONOSCOPY  2015    CORONARY ARTERY BYPASS GRAFT  2009    x4    FL RETROGRADE PYELOGRAM  9/5/2024    LASIK      FL CYSTO/URETERO W/LITHOTRIPSY &INDWELL STENT INSRT Left 9/5/2024    Procedure: CYSTOSCOPY URETEROSCOPY, STONE MANIPULATION, RETROGRADE PYELOGRAM AND INSERTION STENT URETERAL;  Surgeon: Jersey Gunderson MD;  Location: [facility];  Service: Urology       Family History   Problem Relation Age of Onset    Alzheimer's disease Mother     Other Mother         alzheimers    Cancer Father         rectal    Stroke Father     Rectal cancer Father      I have reviewed and agree with the history as documented.    E-Cigarette/Vaping    E-Cigarette Use Never User      E-Cigarette/Vaping Substances    Nicotine No     THC No     CBD No     Flavoring No     Other No     Unknown No      Social History     Tobacco Use    Smoking status: Never     Passive exposure: Past    Smokeless tobacco: Never   Vaping Use    Vaping status: Never Used   Substance Use Topics    Alcohol use: Yes     Comment: seldom    Drug use: Never       Review of Systems   Neurological:  Positive for dizziness.   All other systems reviewed and are negative.      Physical Exam  Physical Exam  Vitals and nursing note reviewed.   Constitutional:       General: He is not in acute distress.  Cardiovascular:      Rate and Rhythm: Normal rate and regular rhythm.   Pulmonary:      Effort: Pulmonary effort is normal. No respiratory distress.      Breath sounds: Normal breath sounds.   Neurological:      General: No focal deficit  present.      Mental Status: He is alert and oriented to person, place, and time.         Vital Signs  ED Triage Vitals [09/04/24 1920]   Temperature Pulse Respirations Blood Pressure SpO2   98.2 °F (36.8 °C) 80 22 (!) 191/100 97 %      Temp Source Heart Rate Source Patient Position - Orthostatic VS BP Location FiO2 (%)   Oral Monitor Lying Right arm --      Pain Score       7           Vitals:    09/05/24 1451 09/05/24 2027 09/05/24 2222 09/06/24 0759   BP: 135/83 137/82 139/81 139/82   Pulse: 91 94 94 92   Patient Position - Orthostatic VS: Lying            Visual Acuity      ED Medications  Medications   ezetimibe (ZETIA) tablet 10 mg (10 mg Oral Given 9/6/24 0939)   metoprolol succinate (TOPROL-XL) 24 hr tablet 50 mg (50 mg Oral Given 9/6/24 0939)   multivitamin-minerals (CENTRUM) tablet 1 tablet (1 tablet Oral Given 9/6/24 0939)   atorvastatin (LIPITOR) tablet 80 mg (80 mg Oral Given 9/5/24 1628)   tamsulosin (FLOMAX) capsule 0.4 mg (0.4 mg Oral Given 9/5/24 1628)   sodium chloride 0.9 % infusion (100 mL/hr Intravenous New Bag 9/5/24 2257)   acetaminophen (TYLENOL) tablet 650 mg (650 mg Oral Given 9/5/24 2221)   ondansetron (ZOFRAN) injection 4 mg (has no administration in time range)   HYDROmorphone HCl (DILAUDID) injection 0.2 mg (0.2 mg Intravenous Given 9/5/24 1042)   labetalol (NORMODYNE) injection 10 mg ( Intravenous MAR Unhold 9/5/24 1028)   melatonin tablet 3 mg (3 mg Oral Given 9/5/24 2222)   lisinopril (ZESTRIL) tablet 5 mg (5 mg Oral Given 9/6/24 0939)   lactated ringers bolus 1,000 mL (has no administration in time range)     And   lactated ringers bolus 1,000 mL (has no administration in time range)   sodium chloride 0.9 % bolus 1,000 mL (has no administration in time range)     And   sodium chloride 0.9 % bolus 1,000 mL (has no administration in time range)   aluminum-magnesium hydroxide-simethicone (MAALOX) oral suspension 30 mL (has no administration in time range)   sodium chloride 0.9 % bolus  1,000 mL (1,000 mL Intravenous New Bag 9/4/24 1939)   HYDROmorphone (DILAUDID) injection 0.5 mg (0.5 mg Intravenous Given 9/4/24 1936)   ondansetron (ZOFRAN) injection 4 mg (4 mg Intravenous Given 9/4/24 1936)   levofloxacin (LEVAQUIN) IVPB (premix in dextrose) 500 mg 100 mL (500 mg Intravenous New Bag 9/5/24 1154)       Diagnostic Studies  Results Reviewed       Procedure Component Value Units Date/Time    HS Troponin I 2hr [[id number]]  (Normal) Collected: 09/04/24 2253    Lab Status: Final result Specimen: Blood from Arm, Left Updated: 09/04/24 2323     hs TnI 2hr 3 ng/L      Delta 2hr hsTnI 0 ng/L     HS Troponin 0hr (reflex protocol) [[id number]]  (Normal) Collected: 09/04/24 1930    Lab Status: Final result Specimen: Blood from Arm, Right Updated: 09/04/24 2020     hs TnI 0hr 3 ng/L     Comprehensive metabolic panel [[id number]]  (Abnormal) Collected: 09/04/24 1930    Lab Status: Final result Specimen: Blood from Arm, Right Updated: 09/04/24 2014     Sodium 134 mmol/L      Potassium 3.9 mmol/L      Chloride 100 mmol/L      CO2 25 mmol/L      ANION GAP 9 mmol/L      BUN 16 mg/dL      Creatinine 1.39 mg/dL      Glucose 116 mg/dL      Calcium 9.5 mg/dL      AST 23 U/L      ALT 24 U/L      Alkaline Phosphatase 45 U/L      Total Protein 6.9 g/dL      Albumin 4.4 g/dL      Total Bilirubin 1.09 mg/dL      eGFR 50 ml/min/1.73sq m     Narrative:      National Kidney Disease Foundation guidelines for Chronic Kidney Disease (CKD):     Stage 1 with normal or high GFR (GFR > 90 mL/min/1.73 square meters)    Stage 2 Mild CKD (GFR = 60-89 mL/min/1.73 square meters)    Stage 3A Moderate CKD (GFR = 45-59 mL/min/1.73 square meters)    Stage 3B Moderate CKD (GFR = 30-44 mL/min/1.73 square meters)    Stage 4 Severe CKD (GFR = 15-29 mL/min/1.73 square meters)    Stage 5 End Stage CKD (GFR <15 mL/min/1.73 square meters)  Note: GFR calculation is accurate only with a steady state creatinine    Urine Microscopic [[id number]]  (Normal)  Collected: 09/04/24 1934    Lab Status: Final result Specimen: Urine, Clean Catch Updated: 09/04/24 1955     RBC, UA None Seen /hpf      WBC, UA None Seen /hpf      Epithelial Cells None Seen /hpf      Bacteria, UA None Seen /hpf     UA (URINE) with reflex to Scope [[id number]]  (Abnormal) Collected: 09/04/24 1934    Lab Status: Final result Specimen: Urine, Clean Catch Updated: 09/04/24 1949     Color, UA Colorless     Clarity, UA Clear     Specific Gravity, UA 1.010     pH, UA 6.0     Leukocytes, UA Negative     Nitrite, UA Negative     Protein, UA Negative mg/dl      Glucose, UA Negative mg/dl      Ketones, UA Negative mg/dl      Urobilinogen, UA <2.0 mg/dl      Bilirubin, UA Negative     Occult Blood, UA Large    CBC and differential [[id number]]  (Abnormal) Collected: 09/04/24 1930    Lab Status: Final result Specimen: Blood from Arm, Right Updated: 09/04/24 1948     WBC 8.91 Thousand/uL      RBC 5.50 Million/uL      Hemoglobin 16.5 g/dL      Hematocrit 45.3 %      MCV 82 fL      MCH 30.0 pg      MCHC 36.4 g/dL      RDW 12.9 %      MPV 9.0 fL      Platelets 152 Thousands/uL      nRBC 0 /100 WBCs      Segmented % 76 %      Immature Grans % 0 %      Lymphocytes % 15 %      Monocytes % 8 %      Eosinophils Relative 1 %      Basophils Relative 0 %      Absolute Neutrophils 6.71 Thousands/µL      Absolute Immature Grans 0.03 Thousand/uL      Absolute Lymphocytes 1.37 Thousands/µL      Absolute Monocytes 0.69 Thousand/µL      Eosinophils Absolute 0.07 Thousand/µL      Basophils Absolute 0.04 Thousands/µL                    FL retrograde pyelogram   Final Result by Mac Christensen DO (09/05 1113)      Fluoroscopic guidance provided for left retrograde pyelogram and ureteral stent placement.      Please see separate procedure report for additional details.         Workstation performed: [id number]                    Procedures  ECG 12 Lead Documentation Only    Date/Time: 9/4/2024 7:26 PM    Performed by: Collin METZGER  DO Mansoor  Authorized by: Collin Max DO    ECG reviewed by me, the ED Provider: yes    Patient location:  ED  Interpretation:     Interpretation: abnormal    Rate:     ECG rate:  85    ECG rate assessment: normal    Rhythm:     Rhythm: sinus rhythm    Ectopy:     Ectopy: none    Conduction:     Conduction: abnormal      Abnormal conduction: complete RBBB    ST segments:     ST segments:  Normal           ED Course                                               Medical Decision Making  Pulse ox 96% on room air indicating adequate oxygenation.        Case discussed with Dr. Gunderson recommended admission for pain control proceed with the procedure tomorrow morning..  Case discussed with hospitalist on call CONG Ray.    Amount and/or Complexity of Data Reviewed  Labs: ordered.    Risk  Prescription drug management.  Decision regarding hospitalization.                 Disposition  Final diagnoses:   Kidney stone     Time reflects when diagnosis was documented in both MDM as applicable and the Disposition within this note       Time User Action Codes Description Comment    9/4/2024  8:17 PM Collin Max Add [N20.0] Kidney stone     9/4/2024  8:58 PM Heriberto Ray Add [N20.1] Left ureteral calculus           ED Disposition       ED Disposition   Admit    Condition   Stable    Date/Time   Wed Sep 4, 2024  8:18 PM    Comment   Case was discussed with CONG Ray  and the patient's admission status was agreed to be Admission Status: observation status to the service of Dr. Romero.               Follow-up Information       Follow up With Specialties Details Why Contact Info    Jersey Gunderson MD Urology Follow up Patient scheduled Tuesday, September 17 at 12:45 PM scheduled for repeat left flexible ureteroscopy Thursday, September 19 [address] [place] [id number]  [phone]      Samir Jacome DO Family Medicine Schedule an appointment as soon as possible for a visit Keep your appointment on September  11 with Dr. Jacome [address] [address]  [phone]              Discharge Medication List as of 9/6/2024 10:39 AM        START taking these medications    Details   oxyCODONE (Roxicodone) 5 immediate release tablet Take 1 tablet (5 mg total) by mouth every 4 (four) hours as needed for moderate pain for up to 5 days Max Daily Amount: 30 mg, Starting Fri 9/6/2024, Until Wed 9/11/2024 at 2359, Normal      solifenacin (VESICARE) 5 mg tablet Take 1 tablet (5 mg total) by mouth daily for 15 days, Starting Fri 9/6/2024, Until Sat 9/21/2024, Normal           CONTINUE these medications which have NOT CHANGED    Details   aspirin (ECOTRIN LOW STRENGTH) 81 mg EC tablet Take 81 mg by mouth every morning, Historical Med      ezetimibe (ZETIA) 10 mg tablet Take 1 tablet (10 mg total) by mouth daily, Starting Tue 8/27/2024, Normal      metoprolol succinate (TOPROL-XL) 50 mg 24 hr tablet Take 1 tablet (50 mg total) by mouth daily, Starting Tue 8/27/2024, Normal      Multiple Vitamins-Minerals (CENTRUM SILVER 50+MEN PO) every morning, Starting Sun 1/3/2010, Historical Med      rosuvastatin (CRESTOR) 40 MG tablet Take 1 tablet (40 mg total) by mouth every morning, Starting Tue 8/27/2024, Normal      tamsulosin (FLOMAX) 0.4 mg Take 1 capsule (0.4 mg total) by mouth daily with dinner for 14 days, Starting Tue 9/3/2024, Until Tue 9/17/2024, Normal      Ascorbic Acid (Vitamin C) 500 MG CAPS once a week On Saturday, Starting Sun 1/3/2010, Historical Med      Echinacea 500 MG CAPS once a week Saturday, Starting Sun 1/3/2010, Historical Med      Ginkgo Biloba 120 MG CAPS once a week Saturday, Starting Sun 1/3/2010, Historical Med      lisinopril (ZESTRIL) 5 mg tablet Take 1 tablet (5 mg total) by mouth daily Do not start before September 5, 2024., Starting u 9/5/2024, Normal      Omega-3 Fatty Acids (OMEGA 3 PO) once a week On Saturday, Starting Sun 1/3/2010, Historical Med      vitamin B-12 (VITAMIN  B-12) 1,000 mcg tablet once a week On Saturday, Starting Sun 1/3/2010, Historical Med             Outpatient Discharge Orders   Discharge Diet     Activity as tolerated     Call provider for:  severe uncontrolled pain     Call provider for:       PDMP Review         Value Time User    PDMP Reviewed  Yes 9/6/2024  9:42 AM BETY Keyes            ED Provider  Electronically Signed by             Collin Max DO  09/06/24 6729

## 2024-09-04 NOTE — Clinical Note
Case was discussed with  and the patient's admission status was agreed to be Admission Status: observation status to the service of Dr. Velásquez

## 2024-09-04 NOTE — ANESTHESIA PREPROCEDURE EVALUATION
Procedure:  CYSTOSCOPY URETEROSCOPY WITH LITHOTRIPSY HOLMIUM LASER, RETROGRADE PYELOGRAM AND INSERTION STENT URETERAL (Left: Bladder)    Relevant Problems   CARDIO   (+) Complicated migraine   (+) Coronary artery disease involving native coronary artery   (+) History of PTCA with stent   (+) History of coronary artery bypass graft (CABGx4 in 2009)   (+) MI (myocardial infarction) (HCC)   (+) Other hyperlipidemia   (+) Primary hypertension      /RENAL   (+) ANIVAL (acute kidney injury) (HCC)      HEMATOLOGY   (+) Thrombocytopenia (HCC)      NEURO/PSYCH   (+) Complicated migraine        Physical Exam    Airway    Mallampati score: II  TM Distance: >3 FB  Neck ROM: full     Dental       Cardiovascular  Rhythm: regular, Rate: normal    Pulmonary   Breath sounds clear to auscultation    Other Findings        Anesthesia Plan  ASA Score- 3     Anesthesia Type- general with ASA Monitors.         Additional Monitors:     Airway Plan: LMA.    Comment: Water at 6 am.       Plan Factors-    Chart reviewed.        Patient is not a current smoker.              Induction- intravenous.    Postoperative Plan- Plan for postoperative opioid use.     Perioperative Resuscitation Plan - Level 1 - Full Code.       Informed Consent- Anesthetic plan and risks discussed with patient.  I personally reviewed this patient with the CRNA. Discussed and agreed on the Anesthesia Plan with the CRNA..

## 2024-09-05 ENCOUNTER — APPOINTMENT (OUTPATIENT)
Dept: RADIOLOGY | Facility: HOSPITAL | Age: 69
DRG: 661 | End: 2024-09-05
Payer: COMMERCIAL

## 2024-09-05 ENCOUNTER — ANESTHESIA (OUTPATIENT)
Dept: PERIOP | Facility: HOSPITAL | Age: 69
DRG: 661 | End: 2024-09-05
Payer: COMMERCIAL

## 2024-09-05 PROBLEM — N18.2 STAGE 2 CHRONIC KIDNEY DISEASE: Status: ACTIVE | Noted: 2024-09-05

## 2024-09-05 PROBLEM — N17.9 AKI (ACUTE KIDNEY INJURY) (HCC): Status: RESOLVED | Noted: 2024-09-02 | Resolved: 2024-09-05

## 2024-09-05 LAB
4HR DELTA HS TROPONIN: 0 NG/L
ALBUMIN SERPL BCG-MCNC: 4 G/DL (ref 3.5–5)
ALP SERPL-CCNC: 43 U/L (ref 34–104)
ALT SERPL W P-5'-P-CCNC: 22 U/L (ref 7–52)
ANION GAP SERPL CALCULATED.3IONS-SCNC: 7 MMOL/L (ref 4–13)
AST SERPL W P-5'-P-CCNC: 21 U/L (ref 13–39)
ATRIAL RATE: 85 BPM
BASOPHILS # BLD AUTO: 0.01 THOUSANDS/ÂΜL (ref 0–0.1)
BASOPHILS NFR BLD AUTO: 0 % (ref 0–1)
BILIRUB SERPL-MCNC: 0.95 MG/DL (ref 0.2–1)
BUN SERPL-MCNC: 13 MG/DL (ref 5–25)
CALCIUM SERPL-MCNC: 9 MG/DL (ref 8.4–10.2)
CARDIAC TROPONIN I PNL SERPL HS: 3 NG/L
CHLORIDE SERPL-SCNC: 107 MMOL/L (ref 96–108)
CO2 SERPL-SCNC: 24 MMOL/L (ref 21–32)
CREAT SERPL-MCNC: 1.21 MG/DL (ref 0.6–1.3)
EOSINOPHIL # BLD AUTO: 0.01 THOUSAND/ÂΜL (ref 0–0.61)
EOSINOPHIL NFR BLD AUTO: 0 % (ref 0–6)
ERYTHROCYTE [DISTWIDTH] IN BLOOD BY AUTOMATED COUNT: 13.2 % (ref 11.6–15.1)
GFR SERPL CREATININE-BSD FRML MDRD: 60 ML/MIN/1.73SQ M
GLUCOSE P FAST SERPL-MCNC: 108 MG/DL (ref 65–99)
GLUCOSE SERPL-MCNC: 108 MG/DL (ref 65–140)
HCT VFR BLD AUTO: 44.9 % (ref 36.5–49.3)
HGB BLD-MCNC: 16.1 G/DL (ref 12–17)
IMM GRANULOCYTES # BLD AUTO: 0.02 THOUSAND/UL (ref 0–0.2)
IMM GRANULOCYTES NFR BLD AUTO: 0 % (ref 0–2)
LYMPHOCYTES # BLD AUTO: 0.77 THOUSANDS/ÂΜL (ref 0.6–4.47)
LYMPHOCYTES NFR BLD AUTO: 9 % (ref 14–44)
MAGNESIUM SERPL-MCNC: 2 MG/DL (ref 1.9–2.7)
MCH RBC QN AUTO: 29.7 PG (ref 26.8–34.3)
MCHC RBC AUTO-ENTMCNC: 35.9 G/DL (ref 31.4–37.4)
MCV RBC AUTO: 83 FL (ref 82–98)
MONOCYTES # BLD AUTO: 0.39 THOUSAND/ÂΜL (ref 0.17–1.22)
MONOCYTES NFR BLD AUTO: 4 % (ref 4–12)
NEUTROPHILS # BLD AUTO: 7.58 THOUSANDS/ÂΜL (ref 1.85–7.62)
NEUTS SEG NFR BLD AUTO: 87 % (ref 43–75)
NRBC BLD AUTO-RTO: 0 /100 WBCS
P AXIS: 57 DEGREES
PLATELET # BLD AUTO: 162 THOUSANDS/UL (ref 149–390)
PMV BLD AUTO: 9.2 FL (ref 8.9–12.7)
POTASSIUM SERPL-SCNC: 4 MMOL/L (ref 3.5–5.3)
PR INTERVAL: 188 MS
PROT SERPL-MCNC: 6.2 G/DL (ref 6.4–8.4)
QRS AXIS: -29 DEGREES
QRSD INTERVAL: 120 MS
QT INTERVAL: 388 MS
QTC INTERVAL: 461 MS
RBC # BLD AUTO: 5.42 MILLION/UL (ref 3.88–5.62)
SODIUM SERPL-SCNC: 138 MMOL/L (ref 135–147)
T WAVE AXIS: 47 DEGREES
VENTRICULAR RATE: 85 BPM
WBC # BLD AUTO: 8.78 THOUSAND/UL (ref 4.31–10.16)

## 2024-09-05 PROCEDURE — C1769 GUIDE WIRE: HCPCS | Performed by: SPECIALIST

## 2024-09-05 PROCEDURE — 85025 COMPLETE CBC W/AUTO DIFF WBC: CPT

## 2024-09-05 PROCEDURE — C2617 STENT, NON-COR, TEM W/O DEL: HCPCS | Performed by: SPECIALIST

## 2024-09-05 PROCEDURE — 93010 ELECTROCARDIOGRAM REPORT: CPT | Performed by: INTERNAL MEDICINE

## 2024-09-05 PROCEDURE — 99232 SBSQ HOSP IP/OBS MODERATE 35: CPT

## 2024-09-05 PROCEDURE — 83735 ASSAY OF MAGNESIUM: CPT | Performed by: NURSE PRACTITIONER

## 2024-09-05 PROCEDURE — 84484 ASSAY OF TROPONIN QUANT: CPT | Performed by: NURSE PRACTITIONER

## 2024-09-05 PROCEDURE — 94760 N-INVAS EAR/PLS OXIMETRY 1: CPT

## 2024-09-05 PROCEDURE — C1894 INTRO/SHEATH, NON-LASER: HCPCS | Performed by: SPECIALIST

## 2024-09-05 PROCEDURE — BT1F1ZZ FLUOROSCOPY OF LEFT KIDNEY, URETER AND BLADDER USING LOW OSMOLAR CONTRAST: ICD-10-PCS | Performed by: RADIOLOGY

## 2024-09-05 PROCEDURE — 0T778DZ DILATION OF LEFT URETER WITH INTRALUMINAL DEVICE, VIA NATURAL OR ARTIFICIAL OPENING ENDOSCOPIC: ICD-10-PCS | Performed by: SPECIALIST

## 2024-09-05 PROCEDURE — C1747 URETEROSCOPE DIGITAL FLEX SNGL USE STD DEFLECTION APTRA: HCPCS | Performed by: SPECIALIST

## 2024-09-05 PROCEDURE — 74420 UROGRAPHY RTRGR +-KUB: CPT

## 2024-09-05 PROCEDURE — 80053 COMPREHEN METABOLIC PANEL: CPT | Performed by: NURSE PRACTITIONER

## 2024-09-05 DEVICE — INLAY URETERAL STENT W/O GUIDEWIRE
Type: IMPLANTABLE DEVICE | Site: URETER | Status: FUNCTIONAL
Brand: BARD® INLAY® URETERAL STENT

## 2024-09-05 RX ORDER — HEPARIN SODIUM 5000 [USP'U]/ML
5000 INJECTION, SOLUTION INTRAVENOUS; SUBCUTANEOUS EVERY 8 HOURS SCHEDULED
Status: DISCONTINUED | OUTPATIENT
Start: 2024-09-06 | End: 2024-09-05

## 2024-09-05 RX ORDER — FENTANYL CITRATE 50 UG/ML
INJECTION, SOLUTION INTRAMUSCULAR; INTRAVENOUS AS NEEDED
Status: DISCONTINUED | OUTPATIENT
Start: 2024-09-05 | End: 2024-09-05

## 2024-09-05 RX ORDER — EPHEDRINE SULFATE 50 MG/ML
INJECTION INTRAVENOUS AS NEEDED
Status: DISCONTINUED | OUTPATIENT
Start: 2024-09-05 | End: 2024-09-05

## 2024-09-05 RX ORDER — CEFAZOLIN SODIUM 2 G/50ML
SOLUTION INTRAVENOUS AS NEEDED
Status: DISCONTINUED | OUTPATIENT
Start: 2024-09-05 | End: 2024-09-05

## 2024-09-05 RX ORDER — FENTANYL CITRATE/PF 50 MCG/ML
25 SYRINGE (ML) INJECTION
Status: DISCONTINUED | OUTPATIENT
Start: 2024-09-05 | End: 2024-09-05 | Stop reason: HOSPADM

## 2024-09-05 RX ORDER — MAGNESIUM HYDROXIDE/ALUMINUM HYDROXICE/SIMETHICONE 120; 1200; 1200 MG/30ML; MG/30ML; MG/30ML
30 SUSPENSION ORAL EVERY 6 HOURS PRN
Status: DISCONTINUED | OUTPATIENT
Start: 2024-09-05 | End: 2024-09-06 | Stop reason: HOSPADM

## 2024-09-05 RX ORDER — ONDANSETRON 2 MG/ML
INJECTION INTRAMUSCULAR; INTRAVENOUS AS NEEDED
Status: DISCONTINUED | OUTPATIENT
Start: 2024-09-05 | End: 2024-09-05

## 2024-09-05 RX ORDER — MAGNESIUM HYDROXIDE 1200 MG/15ML
LIQUID ORAL AS NEEDED
Status: DISCONTINUED | OUTPATIENT
Start: 2024-09-05 | End: 2024-09-05 | Stop reason: HOSPADM

## 2024-09-05 RX ORDER — LISINOPRIL 5 MG/1
5 TABLET ORAL DAILY
Status: DISCONTINUED | OUTPATIENT
Start: 2024-09-05 | End: 2024-09-06 | Stop reason: HOSPADM

## 2024-09-05 RX ORDER — SODIUM CHLORIDE, SODIUM LACTATE, POTASSIUM CHLORIDE, CALCIUM CHLORIDE 600; 310; 30; 20 MG/100ML; MG/100ML; MG/100ML; MG/100ML
INJECTION, SOLUTION INTRAVENOUS CONTINUOUS PRN
Status: DISCONTINUED | OUTPATIENT
Start: 2024-09-05 | End: 2024-09-05

## 2024-09-05 RX ORDER — LIDOCAINE HYDROCHLORIDE 10 MG/ML
INJECTION, SOLUTION EPIDURAL; INFILTRATION; INTRACAUDAL; PERINEURAL AS NEEDED
Status: DISCONTINUED | OUTPATIENT
Start: 2024-09-05 | End: 2024-09-05

## 2024-09-05 RX ORDER — DEXAMETHASONE SODIUM PHOSPHATE 10 MG/ML
INJECTION, SOLUTION INTRAMUSCULAR; INTRAVENOUS AS NEEDED
Status: DISCONTINUED | OUTPATIENT
Start: 2024-09-05 | End: 2024-09-05

## 2024-09-05 RX ORDER — LEVOFLOXACIN 5 MG/ML
500 INJECTION, SOLUTION INTRAVENOUS EVERY 24 HOURS
Status: COMPLETED | OUTPATIENT
Start: 2024-09-05 | End: 2024-09-05

## 2024-09-05 RX ORDER — ONDANSETRON 2 MG/ML
4 INJECTION INTRAMUSCULAR; INTRAVENOUS ONCE AS NEEDED
Status: DISCONTINUED | OUTPATIENT
Start: 2024-09-05 | End: 2024-09-05 | Stop reason: HOSPADM

## 2024-09-05 RX ORDER — PROPOFOL 10 MG/ML
INJECTION, EMULSION INTRAVENOUS AS NEEDED
Status: DISCONTINUED | OUTPATIENT
Start: 2024-09-05 | End: 2024-09-05

## 2024-09-05 RX ADMIN — HYDROMORPHONE HYDROCHLORIDE 0.2 MG: 0.2 INJECTION, SOLUTION INTRAMUSCULAR; INTRAVENOUS; SUBCUTANEOUS at 10:42

## 2024-09-05 RX ADMIN — DEXAMETHASONE SODIUM PHOSPHATE 10 MG: 10 INJECTION, SOLUTION INTRAMUSCULAR; INTRAVENOUS at 08:54

## 2024-09-05 RX ADMIN — EZETIMIBE 10 MG: 10 TABLET ORAL at 10:37

## 2024-09-05 RX ADMIN — FENTANYL CITRATE 50 MCG: 50 INJECTION, SOLUTION INTRAMUSCULAR; INTRAVENOUS at 08:45

## 2024-09-05 RX ADMIN — CEFAZOLIN SODIUM 2000 MG: 2 SOLUTION INTRAVENOUS at 08:39

## 2024-09-05 RX ADMIN — SODIUM CHLORIDE, SODIUM LACTATE, POTASSIUM CHLORIDE, AND CALCIUM CHLORIDE: .6; .31; .03; .02 INJECTION, SOLUTION INTRAVENOUS at 08:39

## 2024-09-05 RX ADMIN — ASPIRIN 81 MG: 81 TABLET, COATED ORAL at 10:37

## 2024-09-05 RX ADMIN — Medication 3 MG: at 22:22

## 2024-09-05 RX ADMIN — ACETAMINOPHEN 650 MG: 325 TABLET ORAL at 22:21

## 2024-09-05 RX ADMIN — TAMSULOSIN HYDROCHLORIDE 0.4 MG: 0.4 CAPSULE ORAL at 16:28

## 2024-09-05 RX ADMIN — PROPOFOL 150 MG: 10 INJECTION, EMULSION INTRAVENOUS at 08:45

## 2024-09-05 RX ADMIN — ATORVASTATIN CALCIUM 80 MG: 80 TABLET, FILM COATED ORAL at 16:28

## 2024-09-05 RX ADMIN — ONDANSETRON 4 MG: 2 INJECTION INTRAMUSCULAR; INTRAVENOUS at 08:54

## 2024-09-05 RX ADMIN — EPHEDRINE SULFATE 10 MG: 50 INJECTION, SOLUTION INTRAVENOUS at 09:18

## 2024-09-05 RX ADMIN — LIDOCAINE HYDROCHLORIDE 5 ML: 10 INJECTION, SOLUTION EPIDURAL; INFILTRATION; INTRACAUDAL; PERINEURAL at 08:45

## 2024-09-05 RX ADMIN — FENTANYL CITRATE 25 MCG: 50 INJECTION, SOLUTION INTRAMUSCULAR; INTRAVENOUS at 09:09

## 2024-09-05 RX ADMIN — Medication 1 TABLET: at 10:37

## 2024-09-05 RX ADMIN — FENTANYL CITRATE 25 MCG: 50 INJECTION, SOLUTION INTRAMUSCULAR; INTRAVENOUS at 09:10

## 2024-09-05 RX ADMIN — SODIUM CHLORIDE 100 ML/HR: 0.9 INJECTION, SOLUTION INTRAVENOUS at 22:57

## 2024-09-05 RX ADMIN — METOPROLOL SUCCINATE 50 MG: 50 TABLET, EXTENDED RELEASE ORAL at 10:37

## 2024-09-05 RX ADMIN — LEVOFLOXACIN 500 MG: 500 INJECTION, SOLUTION INTRAVENOUS at 11:54

## 2024-09-05 NOTE — UTILIZATION REVIEW
NOTIFICATION OF ADMISSION DISCHARGE   This is a Notification of Discharge from [facility]. Please be advised that this patient has been discharge from our facility. Below you will find the admission and discharge date and time including the patient’s disposition.   UTILIZATION REVIEW CONTACT:  Jannette Burdick  Utilization   Network Utilization Review Department  Phone: [phone] x carefully listen to the prompts. All voicemails are confidential.  Email: [email or web address]     ADMISSION INFORMATION  PRESENTATION DATE: 9/1/2024  8:10 PM  OBERVATION ADMISSION DATE: 09/01/2024 2331  INPATIENT ADMISSION DATE: 9/2/24  8:17 AM   DISCHARGE DATE: 9/3/2024 12:31 PM   DISPOSITION:Home/Self Care    Network Utilization Review Department  ATTENTION: Please call with any questions or concerns to [phone] and carefully listen to the prompts so that you are directed to the right person. All voicemails are confidential.   For Discharge needs, contact Care Management DC Support Team at [phone] opt. 2  Send all requests for admission clinical reviews, approved or denied determinations and any other requests to dedicated fax number below belonging to the campus where the patient is receiving treatment. List of dedicated fax numbers for the Facilities:  FACILITY NAME UR FAX NUMBER   ADMISSION DENIALS (Administrative/Medical Necessity) [phone]   DISCHARGE SUPPORT TEAM ([facility]) [phone]   PARENT CHILD HEALTH (Maternity/NICU/Pediatrics) [phone]   [facility] [phone]   [facility] [phone]   [facility] [phone]   [facility] [phone]   [facility] [phone]   [facility] [phone]   [facility] [phone]   [facility]  [phone]   [facility] [phone]   [facility] [phone]   [facility] [phone]   [facility] [phone]

## 2024-09-05 NOTE — ANESTHESIA POSTPROCEDURE EVALUATION
Post-Op Assessment Note    CV Status:  Stable         Mental Status:  Sleepy   Hydration Status:  Stable   PONV Controlled:  Controlled   Airway Patency:  Patent     Post Op Vitals Reviewed: Yes    No anethesia notable event occurred.    Staff: CRNA               BP   141/78   Temp   98   Pulse  116   Resp   20   SpO2   95

## 2024-09-05 NOTE — ASSESSMENT & PLAN NOTE
Patient presents with localized left lower abdomen pain caused by known 7 mm obstructing left ureteral calculus.   Chart reviewed.  Patient was hospitalized 9/2-9/3 for 7mm left ureteral calculus with mild left hydronephrosis and ANIVAL.  Patient was discharged with outpatient cystoscopy scheduled on 9/5.  Patient had follow-up with urology in the office today.  Was sent to ED by urology due to uncontrolled pain.  Creatinine today 1.39.  Improved from 1.84 on 9/1  Patient denies hematuria, dysuria.  Does reports urgency frequency.  UA shows large blood only.  WBC normal, no bands, patient afebrile  IV hydration  Continue Flomax  Strain all urine  Pain control  Keep n.p.o. after midnight  Consult urology in the morning

## 2024-09-05 NOTE — ASSESSMENT & PLAN NOTE
Was on lisinopril 5 mg p.o. daily at home.  It has been held since 9/2 due to ANIVAL.  /100 on ED arrival.  Patient reports mild dizziness this afternoon with associated tingling to both hands.  Denies headache.  BP improved to 152/73 after receiving pain medication.  Restart home medications   PRN labetalol ordered with metrics

## 2024-09-05 NOTE — ASSESSMENT & PLAN NOTE
Baseline creatinine 0.9-1.0 in past year  Creatinine today 1.39.  Improving from 9/1  Likely multifactorial secondary to obstructing kidney stone,+/- on lisinopril  IV hydration  For OR in the morning  Repeat BMP in the morning

## 2024-09-05 NOTE — UTILIZATION REVIEW
Initial Clinical Review    Observation 9/4/24 @ 2032 converted to inpatient admission 9/5/24 @ 1251 for continued care & tx for L ureteral calculus    Admission: Date/Time/Statement:   Admission Orders (From admission, onward)       Ordered        09/05/24 1251  INPATIENT ADMISSION  Once            09/04/24 2032  Place in Observation  Once                          Orders Placed This Encounter   Procedures    INPATIENT ADMISSION     Standing Status:   Standing     Number of Occurrences:   1     Order Specific Question:   Level of Care     Answer:   Med Surg [16]     Order Specific Question:   Estimated length of stay     Answer:   More than 2 Midnights     Order Specific Question:   Certification     Answer:   I certify that inpatient services are medically necessary for this patient for a duration of greater than two midnights. See H&P and MD Progress Notes for additional information about the patient's course of treatment.     ED Arrival Information       Expected   -    Arrival   9/4/2024 19:08    Acuity   Urgent              Means of arrival   Walk-In    Escorted by   Family Member    Service   Hospitalist    Admission type   Emergency              Arrival complaint   sent by Dr. Gunderson             Chief Complaint   Patient presents with    Dizziness     Reported that his BP has been elevated today and have some dizziness, tingling to BLUes, denies any focal weakness, will have procedure to remove kidney stone tmr with DR Gunderson.       Initial Presentation:   69 yom to ER from Urology office for increased localized left lower abdomen pain caused by known 7 mm obstructing left ureteral calculus. Patient was hospitalized 9/2-9/3 for 7mm left ureteral calculus with mild left hydronephrosis and ANIVAL. Patient was discharged with outpatient cystoscopy scheduled on 9/5. Patient had follow-up with urology in the office today. Was sent to ED by urology due to uncontrolled pain. Hx CAD, hypertension, hyperlipidemia,  migraine. Presents hypertensive, urgency, nausea, dizziness, tingling both hands. Admission labs: Na 134, Cr 1.39, tbili 1.09, u/a+blood.  Placed in observation status for L ureteral calculus.  Per urology: L ureteral calculus  Plan: Cystoscopy left ureteroscopy laser lithotripsy stent     Observation to IP admission 9/5/24:  To OR for :  Left - CYSTOSCOPY URETEROSCOPY. STONE MANIPULATION. RETROGRADE PYELOGRAM AND INSERTION STENT URETERAL   Anesthesia Type: General/LMA  Operative Findings:  2.5 cm bilobar prostatic urethra nonobstructing  Significant left ureteral stenosis in the distal ureter as well as proximal ureter  Flexible/rigid ureteroscopy performed  Stone migrating into the middle pole calyx unable to place scope in light of stenosis stent 24 cm 6 English passed  Will need to the OR for left ESWL or ureteroscopy in a few weeks    Post-op: IVABT, Flomax, IVF, pain mgt.     ED Triage Vitals [09/04/24 1920]   Temperature Pulse Respirations Blood Pressure SpO2 Pain Score   98.2 °F (36.8 °C) 80 22 (!) 191/100 97 % 7     Weight (last 2 days)       Date/Time Weight    09/04/24 2300 88.5 (195)    09/04/24 21:52:57 88.5 (195)    09/04/24 1920 88.5 (195)            Vital Signs (last 3 days)       Date/Time Temp Pulse Resp BP MAP (mmHg) SpO2 O2 Device Cardiac (WDL) Patient Position - Orthostatic VS Pain    09/05/24 1139 -- -- -- -- -- -- None (Room air) -- -- --    09/05/24 11:27:51 98.7 °F (37.1 °C) 87 18 150/88 109 95 % -- -- -- --    09/05/24 1042 -- -- -- -- -- -- -- -- -- 7    09/05/24 10:27:30 -- 101 19 150/88 109 94 % -- -- -- --    09/05/24 0950 -- 101 18 149/80 -- 94 % None (Room air) -- -- No Pain    09/05/24 0935 98 °F (36.7 °C) 109 14 141/81 -- 94 % None (Room air) WDL -- --    09/05/24 0710 -- -- -- -- -- -- -- -- -- 5 09/05/24 07:08:33 97.3 °F (36.3 °C) 86 18 157/92 114 96 % None (Room air) -- Lying 5 09/05/24 03:23:09 97.6 °F (36.4 °C) 84 14 143/79 100 96 % -- -- -- --    09/04/24 2311 -- -- -- --  -- -- -- -- -- 7    09/04/24 2300 97.7 °F (36.5 °C) -- 18 142/72 -- -- -- -- -- --    09/04/24 21:52:57 98.7 °F (37.1 °C) -- -- 148/77 101 -- -- -- -- --    09/04/24 2108 -- 77 18 157/76 -- 97 % None (Room air) -- Lying --    09/04/24 2045 97.8 °F (36.6 °C) 86 -- 156/75 108 97 % -- -- -- 5    09/04/24 2015 -- 78 20 152/73 105 96 % None (Room air) -- Lying --    09/04/24 1936 -- -- -- -- -- -- -- -- -- 8    09/04/24 1923 -- -- -- 179/96 -- -- -- -- -- --    09/04/24 1920 98.2 °F (36.8 °C) 80 22 191/100 -- 97 % None (Room air) -- Lying 7              Pertinent Labs/Diagnostic Test Results:   Radiology:  FL retrograde pyelogram   Final Interpretation by Mac Christensen DO (09/05 1113)      Fluoroscopic guidance provided for left retrograde pyelogram and ureteral stent placement.      Please see separate procedure report for additional details.         Workstation performed: [id number]           Results from last 7 days   Lab Units 09/04/24 1930 09/03/24 0609 09/02/24 0537 09/01/24 2026   WBC Thousand/uL 8.91 5.53 5.64 8.28   HEMOGLOBIN g/dL 16.5 15.4 15.5 16.7   HEMATOCRIT % 45.3 43.0 43.7 46.9   PLATELETS Thousands/uL 152 148* 144* 158   TOTAL NEUT ABS Thousands/µL 6.71 3.69 3.53 6.23         Results from last 7 days   Lab Units 09/05/24  0448 09/04/24 1930 09/03/24 0609 09/02/24 0537 09/01/24 2026   SODIUM mmol/L 138 134* 137 134* 136   POTASSIUM mmol/L 4.0 3.9 4.1 4.1 4.2   CHLORIDE mmol/L 107 100 107 101 101   CO2 mmol/L 24 25 24 25 29   ANION GAP mmol/L 7 9 6 8 6   BUN mg/dL 13 16 20 21 21   CREATININE mg/dL 1.21 1.39* 1.33* 1.57* 1.84*   EGFR ml/min/1.73sq m 60 50 53 44 36   CALCIUM mg/dL 9.0 9.5 9.0 8.8 9.4   MAGNESIUM mg/dL 2.0  --  2.0 2.3  --      Results from last 7 days   Lab Units 09/05/24  0448 09/04/24  1930 09/03/24  0609 09/01/24  2026   AST U/L 21 23 16 19   ALT U/L 22 24 17 24   ALK PHOS U/L 43 45 38 44   TOTAL PROTEIN g/dL 6.2* 6.9 5.4* 6.5   ALBUMIN g/dL 4.0 4.4 3.7 4.2   TOTAL  BILIRUBIN mg/dL 0.95 1.09* 1.19* 1.32*   BILIRUBIN DIRECT mg/dL  --   --  0.18 0.26*     Results from last 7 days   Lab Units 09/05/24 0448 09/04/24 1930 09/03/24 0609 09/02/24 0537 09/01/24 2026   GLUCOSE RANDOM mg/dL 108 116 107 103 105     Results from last 7 days   Lab Units 09/05/24 0448 09/04/24 2253 09/04/24 1930   HS TNI 0HR ng/L  --   --  3   HS TNI 2HR ng/L  --  3  --    HSTNI D2 ng/L  --  0  --    HS TNI 4HR ng/L 3  --   --    HSTNI D4 ng/L 0  --   --      Results from last 7 days   Lab Units 09/02/24 0537 09/01/24 2026   LIPASE u/L 17 196*     Results from last 7 days   Lab Units 09/04/24 1934 09/01/24 2036   CLARITY UA  Clear Clear   COLOR UA  Colorless Light Yellow   SPEC GRAV UA  1.010 1.015   PH UA  6.0 6.5   GLUCOSE UA mg/dl Negative Negative   KETONES UA mg/dl Negative Negative   BLOOD UA  Large* Small*   PROTEIN UA mg/dl Negative Negative   NITRITE UA  Negative Negative   BILIRUBIN UA  Negative Negative   UROBILINOGEN UA (BE) mg/dl <2.0 <2.0   LEUKOCYTES UA  Negative Negative   WBC UA /hpf None Seen None Seen   RBC UA /hpf None Seen 0-5   BACTERIA UA /hpf None Seen None Seen   EPITHELIAL CELLS WET PREP /hpf None Seen Occasional     ED Treatment-Medication Administration from 09/04/2024 1908 to 09/04/2024 2127         Date/Time Order Dose Route Action     09/04/2024 1939 sodium chloride 0.9 % bolus 1,000 mL 1,000 mL Intravenous New Bag     09/04/2024 1936 HYDROmorphone (DILAUDID) injection 0.5 mg 0.5 mg Intravenous Given     09/04/2024 1936 ondansetron (ZOFRAN) injection 4 mg 4 mg Intravenous Given            Past Medical History:   Diagnosis Date    ANIVAL (acute kidney injury) ([facility]) 09/02/2024    Coronary artery disease     HTN (hypertension)     Migraine     Myocardial infarction (HCC) 1999    one stent     Present on Admission:   (Resolved) ANIVAL (acute kidney injury) (HCC)   Coronary artery disease involving native coronary artery   Elevated bilirubin   Left ureteral calculus   Primary  hypertension      Admitting Diagnosis: Kidney stone [N20.0]  Dizziness [R42]  Left ureteral calculus [N20.1]  Age/Sex: 69 y.o. male  Admission Orders:  Scd  Strain urine  Consult urology     Scheduled Medications:  Medications 08/27 08/28 08/29 08/30 08/31 09/01 09/02 09/03 09/04 09/05   aspirin (ECOTRIN LOW STRENGTH) EC tablet 81 mg  Dose: 81 mg  Freq: Every morning Route: PO  Start: 09/05/24 0900 End: 09/05/24 1237   Admin Instructions:                1037     1237-D/C'd      aspirin chewable tablet 81 mg  Dose: 81 mg  Freq: Daily Route: PO  Start: 09/02/24 0900 End: 09/03/24 1431          0841      0830     1431-D/C'd        atorvastatin (LIPITOR) tablet 80 mg  Dose: 80 mg  Freq: Daily with dinner Route: PO  Start: 09/05/24 1630             1630        atorvastatin (LIPITOR) tablet 80 mg  Dose: 80 mg  Freq: Daily with dinner Route: PO  Start: 09/02/24 1630 End: 09/03/24 1431          1553      1431-D/C'd        docusate sodium (COLACE) capsule 100 mg  Dose: 100 mg  Freq: 2 times daily Route: PO  Indications of Use: CONSTIPATION  Start: 09/02/24 0900 End: 09/03/24 1431          0841     1708      0830     1431-D/C'd        enoxaparin (LOVENOX) subcutaneous injection 40 mg  Dose: 40 mg  Freq: Daily Route: SC  Start: 09/02/24 0900 End: 09/03/24 1431   Admin Instructions:             0841      0830     1431-D/C'd        ezetimibe (ZETIA) tablet 10 mg  Dose: 10 mg  Freq: Daily Route: PO  Start: 09/05/24 0900   Admin Instructions:                1037        ezetimibe (ZETIA) tablet 10 mg  Dose: 10 mg  Freq: Daily Route: PO  Start: 09/02/24 0900 End: 09/03/24 1431   Admin Instructions:             0841      0830     1431-D/C'd        heparin (porcine) subcutaneous injection 5,000 Units  Dose: 5,000 Units  Freq: Every 8 hours scheduled Route: SC  Start: 09/06/24 0600 End: 09/05/24 1237   Admin Instructions:                1237-D/C'd      HYDROmorphone (DILAUDID) injection 0.5 mg  Dose: 0.5 mg  Freq: Once Route:  IV  Start: 09/04/24 1930 End: 09/04/24 1936   Admin Instructions:               1936         levofloxacin (LEVAQUIN) IVPB (premix in dextrose) 500 mg 100 mL  Dose: 500 mg  Freq: Every 24 hours Route: IV  Last Dose: 500 mg (09/05/24 1154)  Start: 09/05/24 1030 End: 09/05/24 1254   Admin Instructions:      Order specific questions:                1154        lisinopril (ZESTRIL) tablet 5 mg  Dose: 5 mg  Freq: Daily Route: PO  Start: 09/05/24 0900   Admin Instructions:      Order specific questions:                0805 0900     1028        lisinopril (ZESTRIL) tablet 5 mg  Dose: 5 mg  Freq: Daily Route: PO  Start: 09/02/24 0900 End: 09/02/24 0019   Admin Instructions:      Order specific questions:             0019-D/C'd         melatonin tablet 3 mg  Dose: 3 mg  Freq: Daily at bedtime Route: PO  Start: 09/04/24 2315            2321      0805     1028     2200        melatonin tablet 6 mg  Dose: 6 mg  Freq: Daily at bedtime Route: PO  Start: 09/02/24 0115 End: 09/03/24 1431          0118     2201      1431-D/C'd        metoprolol succinate (TOPROL-XL) 24 hr tablet 50 mg  Dose: 50 mg  Freq: Daily Route: PO  Start: 09/05/24 0900   Admin Instructions:      Order specific questions:                1037        metoprolol succinate (TOPROL-XL) 24 hr tablet 50 mg  Dose: 50 mg  Freq: Daily Route: PO  Start: 09/02/24 0900 End: 09/03/24 1431   Admin Instructions:      Order specific questions:             0841      0830     1431-D/C'd        multivitamin stress formula tablet 1 tablet  Dose: 1 tablet  Freq: Daily Route: PO  Start: 09/02/24 0900 End: 09/03/24 1431   Admin Instructions:             0841      0830     1431-D/C'd        multivitamin-minerals (CENTRUM) tablet 1 tablet  Dose: 1 tablet  Freq: Daily Route: PO  Start: 09/05/24 0900   Admin Instructions:                1037        ondansetron (ZOFRAN) injection 4 mg  Dose: 4 mg  Freq: Once Route: IV  Start: 09/04/24 1930 End: 09/04/24 1936   Admin Instructions:                1936         polyethylene glycol (MIRALAX) packet 17 g  Dose: 17 g  Freq: Daily Route: PO  Start: 09/02/24 0900 End: 09/03/24 1431   Admin Instructions:             (3774) (0656)     1431-D/C'd        sodium chloride 0.9 % bolus 1,000 mL  Dose: 1,000 mL  Freq: Once Route: IV  Last Dose: 1,000 mL (09/04/24 1939)  Start: 09/04/24 1930 End: 09/04/24 2039 1939         tamsulosin (FLOMAX) capsule 0.4 mg  Dose: 0.4 mg  Freq: Daily with dinner Route: PO  Start: 09/05/24 1630   Admin Instructions:                1630        tamsulosin (FLOMAX) capsule 0.4 mg  Dose: 0.4 mg  Freq: Once Route: PO  Start: 09/01/24 2345 End: 09/02/24 0023   Admin Instructions:             0023                       Continuous Meds Sorted by Name  for Jordin Figueroa as of 08/27/24 through 9/5/24  Legend:       Medications 08/27 08/28 08/29 08/30 08/31 09/01 09/02 09/03 09/04 09/05   lactated ringers infusion  Freq: Continuous PRN Route: IV  Start: 09/05/24 0839 End: 09/05/24 0931             0839     0925     0931-D/C'd      sodium chloride 0.9 % infusion  Rate: 100 mL/hr Dose: 100 mL/hr  Freq: Continuous Route: IV  Indications of Use: IV Hydration  Last Dose: 100 mL/hr (09/04/24 2241)  Start: 09/04/24 2145            2241         sodium chloride 0.9 % infusion  Rate: 100 mL/hr Dose: 100 mL/hr  Freq: Continuous Route: IV  Indications of Use: IV Hydration  Last Dose: 100 mL/hr (09/02/24 1920)  Start: 09/02/24 0030 End: 09/03/24 1431          0048     1048     1920      1431-D/C'd                    PRN Meds Sorted by Name  for Jordin Figueroa as of 08/27/24 through 9/5/24  Legend:       Medications 08/27 08/28 08/29 08/30 08/31 09/01 09/02 09/03 09/04 09/05   acetaminophen (TYLENOL) tablet 650 mg  Dose: 650 mg  Freq: Every 6 hours PRN Route: PO  PRN Reasons: mild pain,headaches,fever  Indications of Use: FEVER,HEADACHE,MILD PAIN  Start: 09/04/24 3962            8322         acetaminophen (TYLENOL) tablet 650 mg  Dose:  650 mg  Freq: Every 6 hours PRN Route: PO  PRN Reasons: mild pain,headaches,fever  Indications of Use: FEVER,HEADACHE,MILD PAIN  Start: 09/02/24 0019 End: 09/03/24 1431          0118     2201      1431-D/C'd        aluminum-magnesium hydroxide-simethicone (MAALOX) oral suspension 30 mL  Dose: 30 mL  Freq: Every 6 hours PRN Route: PO  PRN Reasons: indigestion,heartburn  Start: 09/05/24 1028   Admin Instructions:                   ceFAZolin (ANCEF) IVPB (premix in dextrose)  Freq: As needed Route: IV  Start: 09/05/24 0839 End: 09/05/24 0931 0839     0931-D/C'd      dexamethasone (PF) (DECADRON) injection  Freq: As needed Route: IV  Start: 09/05/24 0854 End: 09/05/24 0931 0854     0931-D/C'd      ePHEDrine injection  Freq: As needed Route: IV  Start: 09/05/24 0918 End: 09/05/24 0931 0918     0931-D/C'd      fentaNYL (SUBLIMAZE) injection 25 mcg  Dose: 25 mcg  Freq: Every 3 minutes PRN Route: IV  PRN Reason: Pain - PACU  PRN Comment: Breakthrough - first line  Start: 09/05/24 0959 End: 09/05/24 1028   Admin Instructions:                1028-D/C'd      fentaNYL injection  Freq: As needed Route: IV  Start: 09/05/24 0845 End: 09/05/24 0931 0845     0909     0910     0931-D/C'd      hydrALAZINE (APRESOLINE) tablet 10 mg  Dose: 10 mg  Freq: Every 6 hours PRN Route: PO  PRN Comment: for SBP > 160 or DBP > 100  Start: 09/04/24 2132 End: 09/04/24 2139   Admin Instructions:      Order specific questions:               2139-D/C'd       HYDROmorphone (DILAUDID) injection 0.2 mg  Dose: 0.2 mg  Freq: Every 6 hours PRN Route: IV  PRN Reasons: breakthrough pain,other  PRN Comment: or if patient qualifies for treatment of moderate or severe pain but cannot take oral medications  Start: 09/02/24 0019 End: 09/03/24 1431   Admin Instructions:              1431-D/C'd        HYDROmorphone HCl (DILAUDID) injection 0.2 mg  Dose: 0.2 mg  Freq: Every 3 hours PRN Route: IV  PRN Reasons: moderate  pain,severe pain  PRN Comment: Breakthrough pain  Start: 09/04/24 2132   Admin Instructions:                1042        iohexol (OMNIPAQUE) 240 MG/ML solution  Freq: As needed  Start: 09/05/24 0921 End: 09/05/24 0932 0921 0932-D/C'd      iohexol (OMNIPAQUE) 350 MG/ML injection (MULTI-DOSE) 100 mL  Dose: 100 mL  Freq: Once in imaging Route: IV  PRN Reason: contrast  Start: 09/01/24 2212 End: 09/01/24 2212 2212            labetalol (NORMODYNE) injection 10 mg  Dose: 10 mg  Freq: Every 4 hours PRN Route: IV  PRN Reason: high blood pressure  PRN Comment: for SBP > 170 or DBP > 100  Start: 09/04/24 2139   Admin Instructions:                0805     1028         lactated ringers bolus 1,000 mL  Dose: 1,000 mL  Freq: Once as needed Route: IV  PRN Comment: For SBP less than 100mmHg and K level less than or equal to 4.8 as per Urology Hypotension Protocol  Start: 09/05/24 1028 End: 09/06/24 1027   Admin Instructions:                   And   lactated ringers bolus 1,000 mL  Dose: 1,000 mL  Freq: Once as needed Route: IV  PRN Comment: For SBP less than 100mmHg and K level less than or equal to 4.8 as per Urology Hypotension Protocol  Start: 09/05/24 1028 End: 09/06/24 1027   Admin Instructions:                   lactated ringers infusion  Freq: Continuous PRN Route: IV  Start: 09/05/24 0839 End: 09/05/24 0931 0839     0925     0931-D/C'd      lidocaine (PF) (XYLOCAINE-MPF) 1 % injection  Freq: As needed Route: IV  Start: 09/05/24 0845 End: 09/05/24 0931 0845     0931-D/C'd      naloxone (NARCAN) 0.04 mg/mL syringe 0.04 mg  Dose: 0.04 mg  Freq: Every 1 Minute as needed Route: IV  PRN Reasons: respiratory depression,opioid reversal  PRN Comment: For Respiratory Rate less than 8  Start: 09/02/24 0019 End: 09/03/24 1431   Admin Instructions:              1431-D/C'd        ondansetron (ZOFRAN) injection  Freq: As needed Route: IV  Start: 09/05/24 0854 End: 09/05/24 0931              0854     0931-D/C'd      ondansetron (ZOFRAN) injection 4 mg  Dose: 4 mg  Freq: Once as needed Route: IV  PRN Reason: nausea  PRN Comment: First Line For Nausea  Start: 09/05/24 0959 End: 09/05/24 1028   Admin Instructions:                1028-D/C'd      ondansetron (ZOFRAN) injection 4 mg  Dose: 4 mg  Freq: Every 6 hours PRN Route: IV  PRN Reasons: nausea,vomiting  Start: 09/04/24 2132   Admin Instructions:                    oxyCODONE (ROXICODONE) split tablet 2.5 mg  Dose: 2.5 mg  Freq: Every 4 hours PRN Route: PO  PRN Reason: moderate pain  Start: 09/02/24 0019 End: 09/03/24 1431   Admin Instructions:              1431-D/C'd        Or   oxyCODONE (ROXICODONE) IR tablet 5 mg  Dose: 5 mg  Freq: Every 4 hours PRN Route: PO  PRN Reason: severe pain  Start: 09/02/24 0019 End: 09/03/24 1431   Admin Instructions:              1431-D/C'd        phenylephrine bolus from bag  Freq: As needed Route: IV  Start: 09/05/24 0902 End: 09/05/24 0931             0902     0907     0931-D/C'd      propofol (DIPRIVAN) 200 MG/20ML bolus injection  Freq: As needed Route: IV  Start: 09/05/24 0845 End: 09/05/24 0931             0845     0931-D/C'd       sodium chloride 0.9 % bolus 1,000 mL  Dose: 1,000 mL  Freq: Once as needed Route: IV  PRN Comment: For SBP less than 100mmHg and K level greater than 4.8 as per Urology Hypotension Protocol  Start: 09/05/24 1028 End: 09/06/24 1027   Admin Instructions:                   And   sodium chloride 0.9 % bolus 1,000 mL  Dose: 1,000 mL  Freq: Once as needed Route: IV  PRN Comment: For SBP less than 100mmHg and K level greater than 4.8 as per Urology Hypotension Protocol  Start: 09/05/24 1028 End: 09/06/24 1027   Admin Instructions:                   sodium chloride 0.9 % irrigation solution  Freq: As needed  Start: 09/05/24 0853 End: 09/05/24 0932             0853     0932-D/C'd      sterile water irrigation solution  Freq: As needed  Start: 09/05/24 0854 End: 09/05/24 0932 0854 [C]      0932-D/C'd      Legend:       [id number]/3008/3109/0109/0209/0309/0409/05        Network Utilization Review Department  ATTENTION: Please call with any questions or concerns to [phone] and carefully listen to the prompts so that you are directed to the right person. All voicemails are confidential.   For Discharge needs, contact Care Management DC Support Team at [phone] opt. 2  Send all requests for admission clinical reviews, approved or denied determinations and any other requests to dedicated fax number below belonging to the campus where the patient is receiving treatment. List of dedicated fax numbers for the Facilities:  FACILITY NAME UR FAX NUMBER   ADMISSION DENIALS (Administrative/Medical Necessity) [phone]   DISCHARGE SUPPORT TEAM (NETWORK) [phone]   PARENT CHILD HEALTH (Maternity/NICU/Pediatrics) [phone]   [facility] [phone]   [facility] [phone]   [facility] [phone]   General acute hospital [phone]   [facility] [phone]   [facility] [phone]   [facility] [phone]   [facility] [phone]   [facility] [phone]   [facility] [phone]   [facility] [phone]   [facility] [phone]

## 2024-09-05 NOTE — H&P
[facility]  H&P  Name: Jordin Figueroa 69 y.o. male I MRN: [id number]  Unit/Bed#: 2 [facility] 218  Date of Admission: 9/4/2024   Date of Service: 9/4/2024 I Hospital Day: 0      Assessment & Plan   * Left ureteral calculus  Assessment & Plan  Patient presents with localized left lower abdomen pain caused by known 7 mm obstructing left ureteral calculus.   Chart reviewed.  Patient was hospitalized 9/2-9/3 for 7mm left ureteral calculus with mild left hydronephrosis and ANIVAL.  Patient was discharged with outpatient cystoscopy scheduled on 9/5.  Patient had follow-up with urology in the office today.  Was sent to ED by urology due to uncontrolled pain.  Creatinine today 1.39.  Improved from 1.84 on 9/1  Patient denies hematuria, dysuria.  Does reports urgency frequency.  UA shows large blood only.  WBC normal, no bands, patient afebrile  IV hydration  Continue Flomax  Strain all urine  Pain control  Keep n.p.o. after midnight  Consult urology in the morning    ANIVAL (acute kidney injury) (HCC)  Assessment & Plan  Baseline creatinine 0.9-1.0 in past year  Creatinine today 1.39.  Improving from 9/1  Likely multifactorial secondary to obstructing kidney stone,+/- on lisinopril  IV hydration  For OR in the morning  Repeat BMP in the morning    Primary hypertension  Assessment & Plan  Was on lisinopril 5 mg p.o. daily at home.  It has been held since 9/2 due to ANIVAL.  /100 on ED arrival.  Patient reports mild dizziness this afternoon with associated tingling to both hands.  Denies headache.  BP improved to 152/73 after receiving pain medication.  Will order labetalol as needed  Denies dizziness in laying down position, denies tingling to both hands on exam currently.  Monitor BP    Elevated bilirubin  Assessment & Plan  Mild  IV hydration  Repeat CMP in the morning    BMI 28.0-28.9,adult  Assessment & Plan  Diet and lifestyle modification    Coronary artery disease involving native coronary  artery  Assessment & Plan  Status post PCI with stent x 1 in 1999 and quadruple bypass in 2009  Continue aspirin statin metoprolol  Denies chest pain      VTE Prophylaxis:  Hold for OR   / sequential compression device   Code Status: Full code  POLST: POLST form is not discussed and not completed at this time.    Anticipated Length of Stay:  Patient will be admitted on an Observation basis with an anticipated length of stay of  < 2 midnights.   Justification for Hospital Stay: Left ureteral calculus    Total Time for Visit, including Counseling / Coordination of Care: 45 minutes.  Greater than 50% of this total time spent on direct patient counseling and coordination of care.    Chief Complaint:   Left lower abdominal pain    History of Present Illness:    Jordin Figueroa is a 69 y.o. male with PMH of CAD, hypertension, hyperlipidemia, migraine who presents with localized left lower abdomen pain caused by known 7 mm obstructing left ureteral calculus.  Patient reports constant pain, sharp in nature, with associated nausea.  Reports he was discharged yesterday, has procedure scheduled for tomorrow.  Reports he saw urology in the office today for procedure tomorrow.  Patient reports having severe pain in the afternoon so he called urology's office, was advised to come in for pain control.  Patient reports mild dizziness started this afternoon as well, with some tingling sensation to both hands, BP checked at home and was in 170s.  Reports not taking lisinopril for 3 days due to procedure tomorrow.  Patient reports left lower abdominal pain is 7 out of 10 currently which is tolerable. Patient denies chest pain, headache, SOB, diarrhea constipation fever chills.  Denies dysuria hematuria, reports urinary urgency frequency.  Reports appetite is okay at home.  Patient denies tingling to both hands currently.  No other complaints.            Review of Systems:    Review of Systems   Gastrointestinal:  Positive for  abdominal pain and nausea.   Genitourinary:  Positive for frequency and urgency.   Neurological:  Positive for dizziness.        Tingling to both hands   All other systems reviewed and are negative.      Past Medical and Surgical History:     Past Medical History:   Diagnosis Date    Coronary artery disease     HTN (hypertension)     Migraine     Myocardial infarction (HCC) 1999    one stent       Past Surgical History:   Procedure Laterality Date    ANGIOPLASTY  1999    one stent    COLONOSCOPY  2015    CORONARY ARTERY BYPASS GRAFT  2009    x4    LASIK         Meds/Allergies:    Prior to Admission medications    Medication Sig Start Date End Date Taking? Authorizing Provider   aspirin (ECOTRIN LOW STRENGTH) 81 mg EC tablet Take 81 mg by mouth every morning   Yes Historical Provider, MD   ezetimibe (ZETIA) 10 mg tablet Take 1 tablet (10 mg total) by mouth daily 8/27/24  Yes Samir Jacome, DO   metoprolol succinate (TOPROL-XL) 50 mg 24 hr tablet Take 1 tablet (50 mg total) by mouth daily 8/27/24  Yes Samir Jacome, DO   Multiple Vitamins-Minerals (CENTRUM SILVER 50+MEN PO) every morning 1/3/10  Yes Historical Provider, MD   rosuvastatin (CRESTOR) 40 MG tablet Take 1 tablet (40 mg total) by mouth every morning 8/27/24  Yes Samir Jacome, DO   tamsulosin (FLOMAX) 0.4 mg Take 1 capsule (0.4 mg total) by mouth daily with dinner for 14 days 9/3/24 9/17/24 Yes Chuy Lopez MD   Ascorbic Acid (Vitamin C) 500 MG CAPS once a week On Saturday 1/3/10   Historical Provider, MD   Echinacea 500 MG CAPS once a week Saturday 1/3/10   Historical Provider, MD   Ginkgo Biloba 120 MG CAPS once a week Saturday 1/3/10   Historical Provider, MD   lisinopril (ZESTRIL) 5 mg tablet Take 1 tablet (5 mg total) by mouth daily Do not start before September 5, 2024. 9/5/24   Chuy Lopez MD   Omega-3 Fatty Acids (OMEGA 3 PO) once a week On Saturday 1/3/10   Historical Provider, MD   vitamin B-12 (VITAMIN B-12) 1,000 mcg tablet once a  week On Saturday 1/3/10   Historical Provider, MD PERSON have reviewed home medications with patient personally.    Allergies: No Known Allergies    Social History:     Marital Status: Single   Occupation: Retired  Patient Pre-hospital Living Situation: Family  Patient Pre-hospital Level of Mobility: Independent  Patient Pre-hospital Diet Restrictions: Cardiac  Substance Use History:   Social History     Substance and Sexual Activity   Alcohol Use Yes    Comment: seldom     Social History     Tobacco Use   Smoking Status Never    Passive exposure: Past   Smokeless Tobacco Never     Social History     Substance and Sexual Activity   Drug Use Never       Family History:    non-contributory    Physical Exam:     Vitals:   Blood Pressure: 157/76 (09/04/24 2108)  Pulse: 77 (09/04/24 2108)  Temperature: 97.8 °F (36.6 °C) (09/04/24 2045)  Temp Source: Tympanic (09/04/24 2045)  Respirations: 18 (09/04/24 2108)  Weight - Scale: 88.5 kg (195 lb) (09/04/24 1920)  SpO2: 97 % (09/04/24 2108)    Physical Exam  Vitals and nursing note reviewed.   Constitutional:       Appearance: He is well-developed.   HENT:      Head: Normocephalic and atraumatic.   Neck:      Thyroid: No thyromegaly.      Vascular: No JVD.      Trachea: No tracheal deviation.   Cardiovascular:      Rate and Rhythm: Normal rate and regular rhythm.      Heart sounds: Normal heart sounds.   Pulmonary:      Effort: Pulmonary effort is normal. No respiratory distress.      Breath sounds: Normal breath sounds. No wheezing or rales.   Abdominal:      General: Bowel sounds are normal. There is no distension.      Palpations: Abdomen is soft.      Tenderness: There is no abdominal tenderness. There is no guarding.   Musculoskeletal:         General: Normal range of motion.      Cervical back: Neck supple.      Right lower leg: No edema.      Left lower leg: No edema.   Skin:     General: Skin is warm and dry.   Neurological:      General: No focal deficit present.       Mental Status: He is alert and oriented to person, place, and time.   Psychiatric:         Mood and Affect: Mood and affect and mood normal.         Judgment: Judgment normal.         Additional Data:     Lab Results: I have personally reviewed pertinent reports.      Results from last 7 days   Lab Units 09/04/24  1930   WBC Thousand/uL 8.91   HEMOGLOBIN g/dL 16.5   HEMATOCRIT % 45.3   PLATELETS Thousands/uL 152   SEGS PCT % 76*   LYMPHO PCT % 15   MONO PCT % 8   EOS PCT % 1     Results from last 7 days   Lab Units 09/04/24  1930   POTASSIUM mmol/L 3.9   CHLORIDE mmol/L 100   CO2 mmol/L 25   BUN mg/dL 16   CREATININE mg/dL 1.39*   CALCIUM mg/dL 9.5   ALK PHOS U/L 45   ALT U/L 24   AST U/L 23           Imaging: I have personally reviewed pertinent reports.      CT Abdomen pelvis with contrast    Result Date: 9/1/2024  Narrative: CT ABDOMEN AND PELVIS WITH IV CONTRAST INDICATION: left sided abd pain. diverticulitis?. . COMPARISON: None. TECHNIQUE: CT examination of the abdomen and pelvis was performed. Multiplanar 2D reformatted images were created from the source data. This examination, like all CT scans performed in the [facility], was performed utilizing techniques to minimize radiation dose exposure, including the use of iterative reconstruction and automated exposure control. Radiation dose length product (DLP) for this visit: 1080 mGy-cm IV Contrast: 100 mL of iohexol (OMNIPAQUE) Enteric Contrast: Not administered. FINDINGS: ABDOMEN LOWER CHEST: No clinically significant abnormality in the visualized lower chest. LIVER/BILIARY TREE: Focal fat adjacent to the falciform ligament. GALLBLADDER: Large gallstone noted. No pericholecystic inflammatory change. SPLEEN: Unremarkable. PANCREAS: Unremarkable. ADRENAL GLANDS: Unremarkable. KIDNEYS/URETERS: Simple renal cyst(s). 7 mm obstructing proximal left ureteral calculus with mild left hydronephrosis. STOMACH AND BOWEL: Nondilated bowel without evidence  of inflammatory changes. Occasional colonic diverticula. APPENDIX: No findings to suggest appendicitis. ABDOMINOPELVIC CAVITY: No ascites. No pneumoperitoneum. No lymphadenopathy. VESSELS: Aortoiliac atherosclerosis without aneurysm. Retroaortic left renal vein. PELVIS REPRODUCTIVE ORGANS: Unremarkable for patient's age. URINARY BLADDER: Unremarkable. ABDOMINAL WALL/INGUINAL REGIONS: Small fat-containing left inguinal hernia. BONES: No acute fracture or suspicious osseous lesion.     Impression: 7 mm obstructing left ureteral calculus with mild left hydronephrosis. Workstation performed: [id number]       EKG, Pathology, and Other Studies Reviewed on Admission:   EKG: NSR, complete right BBB, no ischemic changes    Allscripts Records Reviewed: Yes     ** Please Note: Dragon 360 Dictation voice to text software may have been used in the creation of this document. **

## 2024-09-05 NOTE — OP NOTE
OPERATIVE REPORT  PATIENT NAME: Jordin Figueroa    :  1955  MRN: [id number]  Pt Location: [facility] OR ROOM 04    SURGERY DATE: 2024    Surgeons and Role:     * Jersey Gunderson MD - Primary    Preop Diagnosis:  Ureterolithiasis [N20.1]  Hydronephrosis [N13.30]  Left ureteral calculus [N20.1]    Post-Op Diagnosis Codes:     * Ureterolithiasis [N20.1]     * Hydronephrosis [N13.30]     * Left ureteral calculus [N20.1]    Procedure(s):  Left - CYSTOSCOPY URETEROSCOPY. STONE MANIPULATION. RETROGRADE PYELOGRAM AND INSERTION STENT URETERAL    Specimen(s):  * No specimens in log *    Estimated Blood Loss:   Minimal    Drains:  Ureteral Internal Stent Left ureter 6 Fr. (Active)   Number of days: 0       Anesthesia Type:   General/LMA    Operative Indications:  Ureterolithiasis [N20.1]  Hydronephrosis [N13.30]  Left ureteral calculus [N20.1]  69-year-old male presenting [facility] on  with acute onset left flank pain found on ER CAT scan to have 7 mm left ureteral stone seen on consultation with minimal pain started on medical expulsion therapy with tamsulosin strain urine sent home on Tuesday, September 3 attempting to pass presenting to my office yesterday with worsening pain wishing urgent left ureteroscopy scheduled as an outpatient only to present to the ER last night admitted for left flank pain hypertension now to undergo cystoscopy left stent possible ureteroscopy stone extraction aware risk of anesthesia infection bleeding additional urologic procedures    Operative Findings:  2.5 cm bilobar prostatic urethra nonobstructing  Significant left ureteral stenosis in the distal ureter as well as proximal ureter  Flexible/rigid ureteroscopy performed  Stone migrating into the middle pole calyx unable to place scope in light of stenosis stent 24 cm 6 Lao passed  Will need to the OR for left ESWL or ureteroscopy in a few weeks      Complications:   None    Procedure and Technique:  Patient  identified in the holding area consent discussed wished to proceed with the procedure noting severe pain consent signed left side marked placed in the OR suite.  After anesthesia was induced placed in thigh position draped and prepped in sterile fashion timeout performed a 22 Bulgarian cystoscope passed through the bladder anterior to confirm normalities posterior to confirmed a 2.5 cm by lobar minimally obstructing prostatic urethra scope inserted to the bladder lumen left and right orifice easily found left orifice cannulated with a 0.038 wire which was passed upward into the left renal pelvis fluoroscopic views confirms 8mm stone to be in the left proximal ureter a second 0.038 wire was then passed upward into the renal pelvis the first left as a safety second wire was then cannulated with a 45 cm ureteral sheath attempts at passing up the sheath in the left orifice distal ureter were unsuccessful in light of that the flexible scope was opened and passed up the second wire again significant difficulty and stenosis noted on view from the distal ureter flexible scope was removed the semirigid ureteroscope was then placed into the distal ureter and with some manipulation passed upward through the somewhat stenotic distal ureter and up to the iliac vessels attempts at passing this scope into the proximal ureter were unsuccessful retrograde confirmed mild stenosis the scope was removed and over the second wire the flexible scope was then replaced which was manipulated upward into the proximal ureter with stone migrating into the middle pole attempts of passing the flexible scope was aborted in light of the stenosis proximal to the stone the flexible scope was removed cystoscope replaced and over the safety wire a 24 cm 6 Bulgarian stent was passed the wire moved scope removed.  Procedure well will need to return to the OR in a few weeks for possible left ESWL or flexible ureteroscopy   I was present for the entire  procedure.    Patient Disposition:  PACU              SIGNATURE: Jersey Gunderson MD  DATE: September 5, 2024  TIME: 9:37 AM

## 2024-09-05 NOTE — NURSING NOTE
Rec'd pt stable in bed via stretcher. Pt orientated to unit and room. Call bell at pts bedside within reach. Admission assessment completed at bedside. Vitals obtained - wdl. At this time, pt denies any c/o pain or discomfort. Instructed pt to strain all urine; pt verbalized understanding and has been compliant. At this time, pt denies feeling/noticing any stones passing. Iv fluids infusing per order. Pt educated and made aware that he is to be NPO after midnight for am procedure. Pt verbalized understanding.   Pt denies any questions or concerns at this time. Will continue to monitor.

## 2024-09-05 NOTE — ASSESSMENT & PLAN NOTE
Lab Results   Component Value Date    EGFR 60 09/05/2024    EGFR 50 09/04/2024    EGFR 53 09/03/2024    CREATININE 1.21 09/05/2024    CREATININE 1.39 (H) 09/04/2024    CREATININE 1.33 (H) 09/03/2024   Stable.   At baseline

## 2024-09-05 NOTE — ASSESSMENT & PLAN NOTE
Patient presents with localized left lower abdomen pain caused by known 7 mm obstructing left ureteral calculus.   Chart reviewed.  Patient was hospitalized 9/2-9/3 for 7mm left ureteral calculus with mild left hydronephrosis and ANIVAL.  Patient was discharged with outpatient cystoscopy scheduled on 9/5.  Patient had follow-up with urology in the office today.  Was sent to ED by urology due to uncontrolled pain.  Creatinine today 1.39.  Improved from 1.84 on 9/1  Patient denies hematuria, dysuria.  Does reports urgency frequency.  UA shows large blood only.  WBC normal, no bands, patient afebrile  IV hydration  Continue Flomax  Strain all urine  Pain control  Keep n.p.o  Urology with plan for procedure today

## 2024-09-05 NOTE — PROGRESS NOTES
"Progress Note - Urology      Patient: Jordin Figueroa   : 1955 Sex: male   MRN: [id number]     CSN: [id number]  Unit/Bed#: OR POOL     SUBJECTIVE:   Patient admitted last night for uncontrolled left flank pain hypertension scheduled for elective left ureteroscopy stone extraction today aware risk of anesthesia infection bleeding additional Yannes procedures      Objective   Vitals: /92 (BP Location: Right arm)   Pulse 86   Temp (!) 97.3 °F (36.3 °C) (Oral)   Resp 18   Ht 5' 9\" (1.753 m)   Wt 88.5 kg (195 lb)   SpO2 96%   BMI 28.80 kg/m²     I/O last 24 hours:  In: -   Out: 950 [Urine:950]      Physical Exam:   General Alert awake   Normocephalic atraumatic PERRLA  Lungs clear bilaterally  Cardiac normal S1 normal S2  Abdomen soft, flank pain  Extremities no edema      Lab Results: CBC:   Lab Results   Component Value Date    WBC 8.91 2024    HGB 16.5 2024    HCT 45.3 2024    MCV 82 2024     2024    RBC 5.50 2024    MCH 30.0 2024    MCHC 36.4 2024    RDW 12.9 2024    MPV 9.0 2024    NRBC 0 2024     CMP:   Lab Results   Component Value Date     2024    CO2 24 2024    BUN 13 2024    CREATININE 1.21 2024    CALCIUM 9.0 2024    AST 21 2024    ALT 22 2024    ALKPHOS 43 2024    EGFR 60 2024     Urinalysis:   Lab Results   Component Value Date    COLORU Colorless 2024    CLARITYU Clear 2024    SPECGRAV 1.010 2024    PHUR 6.0 2024    LEUKOCYTESUR Negative 2024    NITRITE Negative 2024    GLUCOSEU Negative 2024    KETONESU Negative 2024    BILIRUBINUR Negative 2024    BLOODU Large (A) 2024     Urine Culture: No results found for: \"URINECX\"  PSA:   Lab Results   Component Value Date    PSA 0.736 2024    PSA 0.69 2023    PSA 0.7 2022    PSA 0.7 2020         Assessment/ Plan:  Cystoscopy left " ureteroscopy laser stent          Jersey Gunderson MD

## 2024-09-05 NOTE — ASSESSMENT & PLAN NOTE
Status post PCI with stent x 1 in 1999 and quadruple bypass in 2009  Continue aspirin statin metoprolol  Denies chest pain

## 2024-09-05 NOTE — ASSESSMENT & PLAN NOTE
Was on lisinopril 5 mg p.o. daily at home.  It has been held since 9/2 due to ANIVAL.  /100 on ED arrival.  Patient reports mild dizziness this afternoon with associated tingling to both hands.  Denies headache.  BP improved to 152/73 after receiving pain medication.  Will order hydralazine as needed  Denies dizziness in laying down position, denies tingling to both hands on exam currently.  Monitor BP

## 2024-09-05 NOTE — DISCHARGE INSTR - AVS FIRST PAGE
#1 no heavy straining or lifting above 10 pounds for 2 weeks    #2 call office fevers, chills, or worsening blood in the urine.    #3  Patient has follow-up Dr. Gunderson Tuesday, September 17 1245 visit scheduled for repeat left flexible ureteroscopy stone extraction for Thursday, September 19 May note blood in the urine may have left flank pain from stent      Jersey Gunderson M.D. [address] [place] [address]  [phone]  8:30 AM to 4:30 PM  Monday through Friday    [facility]  [address]  [address]  [place] [address] [phone]  1:00 to 5:00 PM  Wednesday

## 2024-09-05 NOTE — PLAN OF CARE
Problem: SAFETY ADULT  Goal: Maintains/Returns to pre admission functional level  Description: INTERVENTIONS:  - Perform AM-PAC 6 Click Basic Mobility/ Daily Activity assessment daily.  - Set and communicate daily mobility goal to care team and patient/family/caregiver.   - Collaborate with rehabilitation services on mobility goals if consulted  -  Assess patient's ability to carry out ADLs; assess patient's baseline for ADL function and identify physical deficits which impact ability to perform ADLs (bathing, care of mouth/teeth, toileting, grooming, dressing, etc.)  - Assess/evaluate cause of self-care deficits   - Assess range of motion  - Assess patient's mobility; develop plan if impaired  - Assess patient's need for assistive devices and provide as appropriate  - Encourage maximum independence but intervene and supervise when necessary  - Involve family in performance of ADLs  - Assess for home care needs following discharge   - Consider OT consult to assist with ADL evaluation and planning for discharge  - Provide patient education as appropriate  Outcome: Progressing      Opioid Counseling: I discussed with the patient the potential side effects of opioids including but not limited to addiction, altered mental status, and depression. I stressed avoiding alcohol, benzodiazepines, muscle relaxants and sleep aids unless specifically okayed by a physician. The patient verbalized understanding of the proper use and possible adverse effects of opioids. All of the patient's questions and concerns were addressed. They were instructed to flush the remaining pills down the toilet if they did not need them for pain.

## 2024-09-05 NOTE — PROGRESS NOTES
[facility]  Progress Note  Name: Jordin Figueroa I  MRN: [id number]  Unit/Bed#: OR POOL I Date of Admission: 9/4/2024   Date of Service: 9/5/2024 I Hospital Day: 0    Assessment & Plan   Stage 2 chronic kidney disease  Assessment & Plan  Lab Results   Component Value Date    EGFR 60 09/05/2024    EGFR 50 09/04/2024    EGFR 53 09/03/2024    CREATININE 1.21 09/05/2024    CREATININE 1.39 (H) 09/04/2024    CREATININE 1.33 (H) 09/03/2024   Stable.   At baseline     Elevated bilirubin  Assessment & Plan  Mild  IV hydration  Repeat CMP in the morning    Primary hypertension  Assessment & Plan  Was on lisinopril 5 mg p.o. daily at home.  It has been held since 9/2 due to ANIVAL.  /100 on ED arrival.  Patient reports mild dizziness this afternoon with associated tingling to both hands.  Denies headache.  BP improved to 152/73 after receiving pain medication.  Restart home medications   PRN labetalol ordered with metrics    BMI 28.0-28.9,adult  Assessment & Plan  Diet and lifestyle modification    Coronary artery disease involving native coronary artery  Assessment & Plan  Status post PCI with stent x 1 in 1999 and quadruple bypass in 2009  Continue aspirin statin metoprolol  Denies chest pain    * Left ureteral calculus  Assessment & Plan  Patient presents with localized left lower abdomen pain caused by known 7 mm obstructing left ureteral calculus.   Chart reviewed.  Patient was hospitalized 9/2-9/3 for 7mm left ureteral calculus with mild left hydronephrosis and ANIVAL.  Patient was discharged with outpatient cystoscopy scheduled on 9/5.  Patient had follow-up with urology in the office today.  Was sent to ED by urology due to uncontrolled pain.  Creatinine today 1.39.  Improved from 1.84 on 9/1  Patient denies hematuria, dysuria.  Does reports urgency frequency.  UA shows large blood only.  WBC normal, no bands, patient afebrile  IV hydration  Continue Flomax  Strain all urine  Pain  control  Keep n.p.o  Urology with plan for procedure today       ANIVAL (acute kidney injury) (HCC)-resolved as of 2024  Assessment & Plan  Baseline creatinine 0.9-1.0 in past year  Creatinine today 1.39.  Improving from   Likely multifactorial secondary to obstructing kidney stone,+/- on lisinopril  IV hydration  For OR in the morning  Repeat BMP in the morning               VTE Pharmacologic Prophylaxis: VTE Score: 3 Moderate Risk (Score 3-4) - Pharmacological DVT Prophylaxis Ordered: heparin.    Mobility:   Basic Mobility Inpatient Raw Score: 24  JH-HLM Goal: 8: Walk 250 feet or more  JH-HLM Achieved: 8: Walk 250 feet ot more  JH-HLM Goal achieved. Continue to encourage appropriate mobility.    Patient Centered Rounds: I performed bedside rounds with nursing staff today.   Discussions with Specialists or Other Care Team Provider: urology     Education and Discussions with Family / Patient:  patient .     Total Time Spent on Date of Encounter in care of patient: 30 mins. This time was spent on one or more of the following: performing physical exam; counseling and coordination of care; obtaining or reviewing history; documenting in the medical record; reviewing/ordering tests, medications or procedures; communicating with other healthcare professionals and discussing with patient's family/caregivers.    Current Length of Stay: 0 day(s)  Current Patient Status: Observation   Certification Statement: The patient will continue to require additional inpatient hospital stay due to renal calculi , abdominal pain   Discharge Plan: Anticipate discharge tomorrow to home.    Code Status: Level 1 - Full Code    Subjective:   Patient was seen today at bedside.   Reports remarkable improvement in LLQ pain compared to presentation time.   Currently with mild pain.   No hematuria noted.  Stooling and voiding accordingly     No fever.       Objective:     Vitals:   Temp (24hrs), Av.9 °F (36.6 °C), Min:97.3 °F (36.3 °C),  Max:98.7 °F (37.1 °C)    Temp:  [97.3 °F (36.3 °C)-98.7 °F (37.1 °C)] 97.3 °F (36.3 °C)  HR:  [77-86] 86  Resp:  [14-22] 18  BP: (142-191)/() 157/92  SpO2:  [96 %-97 %] 96 %  Body mass index is 28.8 kg/m².     Input and Output Summary (last 24 hours):     Intake/Output Summary (Last 24 hours) at 9/5/2024 0849  Last data filed at 9/5/2024 0323  Gross per 24 hour   Intake --   Output 950 ml   Net -950 ml       Physical Exam:   Physical Exam  Vitals and nursing note reviewed.   Constitutional:       General: He is not in acute distress.     Appearance: Normal appearance.   HENT:      Head: Normocephalic and atraumatic.      Mouth/Throat:      Mouth: Mucous membranes are moist.   Eyes:      Conjunctiva/sclera: Conjunctivae normal.      Pupils: Pupils are equal, round, and reactive to light.   Cardiovascular:      Rate and Rhythm: Normal rate and regular rhythm.      Pulses: Normal pulses.           Carotid pulses are 2+ on the right side and 2+ on the left side.       Radial pulses are 2+ on the right side and 2+ on the left side.        Dorsalis pedis pulses are 2+ on the right side and 2+ on the left side.      Heart sounds: Normal heart sounds, S1 normal and S2 normal. No murmur heard.  Pulmonary:      Effort: No tachypnea, bradypnea or accessory muscle usage.      Breath sounds: Normal breath sounds and air entry. No decreased breath sounds, wheezing, rhonchi or rales.   Abdominal:      General: Abdomen is flat. Bowel sounds are normal. There is no distension.      Palpations: Abdomen is soft.      Tenderness: There is no abdominal tenderness. There is no right CVA tenderness or left CVA tenderness.   Musculoskeletal:      Right lower leg: No edema.      Left lower leg: No edema.   Neurological:      Mental Status: He is alert and oriented to person, place, and time. Mental status is at baseline.          Additional Data:     Labs:  Results from last 7 days   Lab Units 09/04/24  1930   WBC Thousand/uL 8.91    HEMOGLOBIN g/dL 16.5   HEMATOCRIT % 45.3   PLATELETS Thousands/uL 152   SEGS PCT % 76*   LYMPHO PCT % 15   MONO PCT % 8   EOS PCT % 1     Results from last 7 days   Lab Units 09/05/24  0448   SODIUM mmol/L 138   POTASSIUM mmol/L 4.0   CHLORIDE mmol/L 107   CO2 mmol/L 24   BUN mg/dL 13   CREATININE mg/dL 1.21   ANION GAP mmol/L 7   CALCIUM mg/dL 9.0   ALBUMIN g/dL 4.0   TOTAL BILIRUBIN mg/dL 0.95   ALK PHOS U/L 43   ALT U/L 22   AST U/L 21   GLUCOSE RANDOM mg/dL 108                       Lines/Drains:  Invasive Devices       Peripheral Intravenous Line  Duration             Peripheral IV 09/04/24 Distal;Right;Upper;Ventral (anterior) Arm <1 day                          Imaging: Personally reviewed the following imaging: abdominal/pelvic CT    Recent Cultures (last 7 days):         Last 24 Hours Medication List:   Current Facility-Administered Medications   Medication Dose Route Frequency Provider Last Rate    acetaminophen  650 mg Oral Q6H PRN BETY Carmona      aspirin  81 mg Oral QAM BETY Carmona      atorvastatin  80 mg Oral Daily With Dinner BETY Carmona      ezetimibe  10 mg Oral Daily BETY Carmona      HYDROmorphone  0.2 mg Intravenous Q3H PRN BETY Carmona      [Transfer Hold] labetalol  10 mg Intravenous Q4H PRN BETY Carmona      [Transfer Hold] lisinopril  5 mg Oral Daily Chuy Lopez MD      [Transfer Hold] melatonin  3 mg Oral HS BETY Carmona      metoprolol succinate  50 mg Oral Daily BETY Carmona      multivitamin-minerals  1 tablet Oral Daily BETY Carmona      ondansetron  4 mg Intravenous Q6H PRN BETY Carmona      sodium chloride  100 mL/hr Intravenous Continuous BETY Carmona 100 mL/hr (09/04/24 2241)    tamsulosin  0.4 mg Oral Daily With Dinner BETY Carmona       Facility-Administered Medications Ordered in Other Encounters   Medication Dose Route Frequency Provider Last Rate    ceFAZolin   Intravenous PRN Rekha Archer, CRNA           Today, Patient Was Seen By: Chuy Lopez MD    **Please Note: This note may have been constructed using a voice recognition system.**

## 2024-09-06 ENCOUNTER — TRANSITIONAL CARE MANAGEMENT (OUTPATIENT)
Dept: FAMILY MEDICINE CLINIC | Facility: CLINIC | Age: 69
End: 2024-09-06

## 2024-09-06 VITALS
RESPIRATION RATE: 18 BRPM | DIASTOLIC BLOOD PRESSURE: 82 MMHG | SYSTOLIC BLOOD PRESSURE: 139 MMHG | OXYGEN SATURATION: 96 % | HEIGHT: 69 IN | TEMPERATURE: 97.9 F | WEIGHT: 195 LBS | BODY MASS INDEX: 28.88 KG/M2 | HEART RATE: 92 BPM

## 2024-09-06 LAB
ANION GAP SERPL CALCULATED.3IONS-SCNC: 7 MMOL/L (ref 4–13)
BASOPHILS # BLD AUTO: 0.03 THOUSANDS/ÂΜL (ref 0–0.1)
BASOPHILS NFR BLD AUTO: 0 % (ref 0–1)
BUN SERPL-MCNC: 11 MG/DL (ref 5–25)
CALCIUM SERPL-MCNC: 9.1 MG/DL (ref 8.4–10.2)
CHLORIDE SERPL-SCNC: 106 MMOL/L (ref 96–108)
CO2 SERPL-SCNC: 24 MMOL/L (ref 21–32)
CREAT SERPL-MCNC: 0.75 MG/DL (ref 0.6–1.3)
EOSINOPHIL # BLD AUTO: 0.01 THOUSAND/ÂΜL (ref 0–0.61)
EOSINOPHIL NFR BLD AUTO: 0 % (ref 0–6)
ERYTHROCYTE [DISTWIDTH] IN BLOOD BY AUTOMATED COUNT: 13.1 % (ref 11.6–15.1)
GFR SERPL CREATININE-BSD FRML MDRD: 92 ML/MIN/1.73SQ M
GLUCOSE SERPL-MCNC: 134 MG/DL (ref 65–140)
HCT VFR BLD AUTO: 42.6 % (ref 36.5–49.3)
HGB BLD-MCNC: 15.2 G/DL (ref 12–17)
IMM GRANULOCYTES # BLD AUTO: 0.03 THOUSAND/UL (ref 0–0.2)
IMM GRANULOCYTES NFR BLD AUTO: 0 % (ref 0–2)
LYMPHOCYTES # BLD AUTO: 1.3 THOUSANDS/ÂΜL (ref 0.6–4.47)
LYMPHOCYTES NFR BLD AUTO: 13 % (ref 14–44)
MAGNESIUM SERPL-MCNC: 2 MG/DL (ref 1.9–2.7)
MCH RBC QN AUTO: 29.6 PG (ref 26.8–34.3)
MCHC RBC AUTO-ENTMCNC: 35.7 G/DL (ref 31.4–37.4)
MCV RBC AUTO: 83 FL (ref 82–98)
MONOCYTES # BLD AUTO: 0.73 THOUSAND/ÂΜL (ref 0.17–1.22)
MONOCYTES NFR BLD AUTO: 7 % (ref 4–12)
NEUTROPHILS # BLD AUTO: 8.13 THOUSANDS/ÂΜL (ref 1.85–7.62)
NEUTS SEG NFR BLD AUTO: 80 % (ref 43–75)
NRBC BLD AUTO-RTO: 0 /100 WBCS
PLATELET # BLD AUTO: 155 THOUSANDS/UL (ref 149–390)
PMV BLD AUTO: 9.2 FL (ref 8.9–12.7)
POTASSIUM SERPL-SCNC: 3.9 MMOL/L (ref 3.5–5.3)
RBC # BLD AUTO: 5.13 MILLION/UL (ref 3.88–5.62)
SODIUM SERPL-SCNC: 137 MMOL/L (ref 135–147)
WBC # BLD AUTO: 10.23 THOUSAND/UL (ref 4.31–10.16)

## 2024-09-06 PROCEDURE — 85025 COMPLETE CBC W/AUTO DIFF WBC: CPT | Performed by: SPECIALIST

## 2024-09-06 PROCEDURE — 83735 ASSAY OF MAGNESIUM: CPT | Performed by: SPECIALIST

## 2024-09-06 PROCEDURE — 80048 BASIC METABOLIC PNL TOTAL CA: CPT | Performed by: SPECIALIST

## 2024-09-06 PROCEDURE — 99239 HOSP IP/OBS DSCHRG MGMT >30: CPT | Performed by: NURSE PRACTITIONER

## 2024-09-06 RX ORDER — OXYCODONE HYDROCHLORIDE 5 MG/1
5 TABLET ORAL EVERY 4 HOURS PRN
Qty: 30 TABLET | Refills: 0 | Status: SHIPPED | OUTPATIENT
Start: 2024-09-06 | End: 2024-09-11

## 2024-09-06 RX ORDER — SOLIFENACIN SUCCINATE 5 MG/1
5 TABLET, FILM COATED ORAL DAILY
Qty: 15 TABLET | Refills: 0 | Status: SHIPPED | OUTPATIENT
Start: 2024-09-06 | End: 2024-09-11 | Stop reason: SDUPTHER

## 2024-09-06 RX ADMIN — LISINOPRIL 5 MG: 5 TABLET ORAL at 09:39

## 2024-09-06 RX ADMIN — Medication 1 TABLET: at 09:39

## 2024-09-06 RX ADMIN — METOPROLOL SUCCINATE 50 MG: 50 TABLET, EXTENDED RELEASE ORAL at 09:39

## 2024-09-06 RX ADMIN — EZETIMIBE 10 MG: 10 TABLET ORAL at 09:39

## 2024-09-06 NOTE — ASSESSMENT & PLAN NOTE
Status post PCI with stent x 1 in 1999 and quadruple bypass in 2009  Continue aspirin statin metoprolol  Denies chest pain  Follow-up outpatient PCP 1 to 2 weeks postdischarge

## 2024-09-06 NOTE — PLAN OF CARE
Problem: PAIN - ADULT  Goal: Verbalizes/displays adequate comfort level or baseline comfort level  Description: Interventions:  - Encourage patient to monitor pain and request assistance  - Assess pain using appropriate pain scale  - Administer analgesics based on type and severity of pain and evaluate response  - Implement non-pharmacological measures as appropriate and evaluate response  - Consider cultural and social influences on pain and pain management  - Notify physician/advanced practitioner if interventions unsuccessful or patient reports new pain  Outcome: Progressing     Problem: INFECTION - ADULT  Goal: Absence or prevention of progression during hospitalization  Description: INTERVENTIONS:  - Assess and monitor for signs and symptoms of infection  - Monitor lab/diagnostic results  - Monitor all insertion sites, i.e. indwelling lines, tubes, and drains  - Monitor endotracheal if appropriate and nasal secretions for changes in amount and color  - [place] appropriate cooling/warming therapies per order  - Administer medications as ordered  - Instruct and encourage patient and family to use good hand hygiene technique  - Identify and instruct in appropriate isolation precautions for identified infection/condition  Outcome: Progressing  Goal: Absence of fever/infection during neutropenic period  Description: INTERVENTIONS:  - Monitor WBC    Outcome: Progressing     Problem: SAFETY ADULT  Goal: Patient will remain free of falls  Description: INTERVENTIONS:  - Educate patient/family on patient safety including physical limitations  - Instruct patient to call for assistance with activity   - Consult OT/PT to assist with strengthening/mobility   - Keep Call bell within reach  - Keep bed low and locked with side rails adjusted as appropriate  - Keep care items and personal belongings within reach  - Initiate and maintain comfort rounds  - Make Fall Risk Sign visible to staff  - Offer Toileting every 2  Hours,  in advance of need  - Initiate/Maintain bed alarm  - Obtain necessary fall risk management equipment: non skid socks  - Apply yellow socks and bracelet for high fall risk patients  - Consider moving patient to room near nurses station  Outcome: Progressing  Goal: Maintain or return to baseline ADL function  Description: INTERVENTIONS:  -  Assess patient's ability to carry out ADLs; assess patient's baseline for ADL function and identify physical deficits which impact ability to perform ADLs (bathing, care of mouth/teeth, toileting, grooming, dressing, etc.)  - Assess/evaluate cause of self-care deficits   - Assess range of motion  - Assess patient's mobility; develop plan if impaired  - Assess patient's need for assistive devices and provide as appropriate  - Encourage maximum independence but intervene and supervise when necessary  - Involve family in performance of ADLs  - Assess for home care needs following discharge   - Consider OT consult to assist with ADL evaluation and planning for discharge  - Provide patient education as appropriate  Outcome: Progressing  Goal: Maintains/Returns to pre admission functional level  Description: INTERVENTIONS:  - Perform AM-PAC 6 Click Basic Mobility/ Daily Activity assessment daily.  - Set and communicate daily mobility goal to care team and patient/family/caregiver.   - Collaborate with rehabilitation services on mobility goals if consulted  - Perform Range of Motion 3 times a day.  - Reposition patient every 2 hours.  - Dangle patient 3 times a day  - Stand patient 3 times a day  - Ambulate patient 3 times a day  - Out of bed to chair 3 times a day   - Out of bed for meals 3 times a day  - Out of bed for toileting  - Record patient progress and toleration of activity level   Outcome: Progressing     Problem: DISCHARGE PLANNING  Goal: Discharge to home or other facility with appropriate resources  Description: INTERVENTIONS:  - Identify barriers to discharge w/patient and  caregiver  - Arrange for needed discharge resources and transportation as appropriate  - Identify discharge learning needs (meds, wound care, etc.)  - Arrange for interpretive services to assist at discharge as needed  - Refer to Case Management Department for coordinating discharge planning if the patient needs post-hospital services based on physician/advanced practitioner order or complex needs related to functional status, cognitive ability, or social support system  Outcome: Progressing     Problem: Knowledge Deficit  Goal: Patient/family/caregiver demonstrates understanding of disease process, treatment plan, medications, and discharge instructions  Description: Complete learning assessment and assess knowledge base.  Interventions:  - Provide teaching at level of understanding  - Provide teaching via preferred learning methods  Outcome: Progressing     Problem: GENITOURINARY - ADULT  Goal: Maintains or returns to baseline urinary function  Description: INTERVENTIONS:  - Assess urinary function  - Encourage oral fluids to ensure adequate hydration if ordered  - Administer IV fluids as ordered to ensure adequate hydration  - Administer ordered medications as needed  - Offer frequent toileting  - Follow urinary retention protocol if ordered  Outcome: Progressing  Goal: Absence of urinary retention  Description: INTERVENTIONS:  - Assess patient’s ability to void and empty bladder  - Monitor I/O  - Bladder scan as needed  - Discuss with physician/AP medications to alleviate retention as needed  - Discuss catheterization for long term situations as appropriate  Outcome: Progressing  Goal: Urinary catheter remains patent  Description: INTERVENTIONS:  - Assess patency of urinary catheter  - If patient has a chronic aceves, consider changing catheter if non-functioning  - Follow guidelines for intermittent irrigation of non-functioning urinary catheter  Outcome: Progressing

## 2024-09-06 NOTE — PROGRESS NOTES
"Progress Note - Urology      Patient: Jordin Figueroa   : 1955 Sex: male   MRN: [id number]     CSN: [id number]  Unit/Bed#: [address] [place] [address]     SUBJECTIVE:   Patient seen on morning rounds  Stent giving him some discomfort  Doing well  To be discharged home to be seen in the office next week for preop discussion for left ureteroscopy stone extraction on       Objective   Vitals: /82   Pulse 92   Temp 97.9 °F (36.6 °C)   Resp 18   Ht 5' 9\" (1.753 m)   Wt 88.5 kg (195 lb)   SpO2 96%   BMI 28.80 kg/m²     I/O last 24 hours:  In: 400 [I.V.:400]  Out: 750 [Urine:750]      Physical Exam:   General Alert awake   Normocephalic atraumatic PERRLA  Lungs clear bilaterally  Cardiac normal S1 normal S2  Abdomen soft, flank pain  Extremities no edema      Lab Results: CBC:   Lab Results   Component Value Date    WBC 10.23 (H) 2024    HGB 15.2 2024    HCT 42.6 2024    MCV 83 2024     2024    RBC 5.13 2024    MCH 29.6 2024    MCHC 35.7 2024    RDW 13.1 2024    MPV 9.2 2024    NRBC 0 2024     CMP:   Lab Results   Component Value Date     2024    CO2 24 2024    BUN 11 2024    CREATININE 0.75 2024    CALCIUM 9.1 2024    AST 21 2024    ALT 22 2024    ALKPHOS 43 2024    EGFR 92 2024     Urinalysis:   Lab Results   Component Value Date    COLORU Colorless 2024    CLARITYU Clear 2024    SPECGRAV 1.010 2024    PHUR 6.0 2024    LEUKOCYTESUR Negative 2024    NITRITE Negative 2024    GLUCOSEU Negative 2024    KETONESU Negative 2024    BILIRUBINUR Negative 2024    BLOODU Large (A) 2024     Urine Culture: No results found for: \"URINECX\"  PSA:   Lab Results   Component Value Date    PSA 0.736 2024    PSA 0.69 2023    PSA 0.7 2022    PSA 0.7 2020         Assessment/ Plan:  Status post cystoscopy " ureteroscopy stone manipulation stent placement  Day #1  To be discharged home schedule cystoscopy left ureteroscopy stone extraction on September 19          Jersey Gunderson MD

## 2024-09-06 NOTE — ASSESSMENT & PLAN NOTE
Was on lisinopril 5 mg p.o. daily at home.  It had been held since 9/2 due to ANIVAL.  /100 on ED arrival.  Patient reports mild dizziness this afternoon with associated tingling to both hands.  Denies headache.  BP improved to 152/73 after receiving pain medication.  Resumed home medications   Follow-up outpatient PCP 1 to 2 weeks postdischarge

## 2024-09-06 NOTE — ASSESSMENT & PLAN NOTE
Patient presents with localized left lower abdomen pain caused by known 7 mm obstructing left ureteral calculus.   Chart reviewed.  Patient was hospitalized 9/2-9/3 for 7mm left ureteral calculus with mild left hydronephrosis and ANIVAL.  Patient was discharged with outpatient cystoscopy scheduled on 9/5.  Patient had follow-up with urology in the office morning of admission.  Was sent to ED by urology due to uncontrolled pain.  Creatinine today 0.75.  Improved from 1.84 on 9/1  Patient initially denied any hematuria, dysuria  UA shows large blood only.  WBC normal, no bands, patient afebrile  Patient received IV hydration during hospitalization  Continue Flomax, Vesicare added as per urology  Patient underwent cystoscopy with left stent placement, unable to retrieve stone at this time, plan for outpatient procedure in approximately 2 weeks for removal of stone.  Patient is to follow-up with urology outpatient 1 to 2 weeks postdischarge.  Patient is also advised to follow-up with his PCP 1 to 2 weeks postdischarge

## 2024-09-06 NOTE — ASSESSMENT & PLAN NOTE
Lab Results   Component Value Date    EGFR 92 09/06/2024    EGFR 60 09/05/2024    EGFR 50 09/04/2024    CREATININE 0.75 09/06/2024    CREATININE 1.21 09/05/2024    CREATININE 1.39 (H) 09/04/2024   Stable.   At baseline   Follow-up outpatient PCP 1 to 2 weeks postdischarge

## 2024-09-06 NOTE — UTILIZATION REVIEW
Continued Stay Review    Date: 9-6-24   Day 3: Has surpassed a 2nd midnight with active treatments and services for post op care day 1.                          Current Patient Class: Inpatient Current Level of Care: med surg    HPI:69 y.o. male initially admitted on 9-4-24 for left ureteral  calculus   Left - CYSTOSCOPY URETEROSCOPY. STONE MANIPULATION. RETROGRADE PYELOGRAM AND INSERTION STENT URETERAL - unable to retrive stone.     Assessment/Plan:     Afebrile VSS.  Am creatinine improved 1.39> 1.21 > 0.75. Patient with burning pain 2/10.  Iv fluids 100/hr.      Plan is for patient to be discharged to home, will be taking Vesicare in addition to his Flomax and plan for outpatient cystoscopy in approximately 2 weeks with urology. Patient is advised to follow-up with his primary care physician 1 to 2 weeks postdischarge. He will continue to monitor his urine which is now blood-tinged no clots. If he has difficulty urinating or sees an increase of blood or clots he is aware to call the urology office for further direction. Patient is discharged to home today.     Vital Signs (last 3 days) before discharge       Date/Time Temp Pulse Resp BP MAP (mmHg) SpO2 O2 Device Rell Coma Scale Score Pain    09/06/24 0800 -- -- -- -- -- -- -- 15 2    09/06/24 07:59:46 97.9 °F (36.6 °C) 92 18 139/82 101 96 % -- -- --    09/05/24 22:22:37 97.8 °F (36.6 °C) 94 18 139/81 100 96 % -- -- --    09/05/24 2221 -- -- -- -- -- -- -- -- 4    09/05/24 20:27:34 98.4 °F (36.9 °C) 94 18 137/82 100 95 % -- -- --    09/05/24 14:51:26 98.3 °F (36.8 °C) 91 18 135/83 100 95 % None (Room air) -- --    09/05/24 1139 -- -- -- -- -- -- None (Room air) -- --    09/05/24 11:27:51 98.7 °F (37.1 °C) 87 18 150/88 109 95 % -- -- --    09/05/24 1042 -- -- -- -- -- -- -- -- 7    09/05/24 10:27:30 -- 101 19 150/88 109 94 % -- -- --    09/05/24 0950 -- 101 18 149/80 -- 94 % None (Room air) -- No Pain    09/05/24 0935 98 °F (36.7 °C) 109 14 141/81 -- 94 %  None (Room air) -- --    09/05/24 0710 -- -- -- -- -- -- -- -- 5 09/05/24 07:08:33 97.3 °F (36.3 °C) 86 18 157/92 114 96 % None (Room air) -- 5 09/05/24 03:23:09 97.6 °F (36.4 °C) 84 14 143/79 100 96 % -- -- --    09/04/24 2311 -- -- -- -- -- -- -- -- 7    09/04/24 2300 97.7 °F (36.5 °C) -- 18 142/72 -- -- -- -- --    09/04/24 21:52:57 98.7 °F (37.1 °C) -- -- 148/77 101 -- -- -- --    09/04/24 2108 -- 77 18 157/76 -- 97 % None (Room air) -- --    09/04/24 2045 97.8 °F (36.6 °C) 86 -- 156/75 108 97 % -- -- 5    09/04/24 2015 -- 78 20 152/73 105 96 % None (Room air) -- --    09/04/24 1936 -- -- -- -- -- -- -- -- 8    09/04/24 1923 -- -- -- 179/96 -- -- -- -- --    09/04/24 1920 98.2 °F (36.8 °C) 80 22 191/100 -- 97 % None (Room air) -- 7          Weight (last 2 days) before discharge       Date/Time Weight    09/04/24 2300 88.5 (195)    09/04/24 21:52:57 88.5 (195)    09/04/24 1920 88.5 (195)              Pertinent Labs/Diagnostic Results:   Radiology:  FL retrograde pyelogram   Final Interpretation by Mac Christensen DO (09/05 1113)      Fluoroscopic guidance provided for left retrograde pyelogram and ureteral stent placement.      Please see separate procedure report for additional details.         Workstation performed: [id number]           Cardiology:  ECG 12 lead   Final Result by Amish Michael MD (09/05 2303)   Normal sinus rhythm   Right bundle branch block   Abnormal ECG   No previous ECGs available   Confirmed by Amish Michael ([id number]) on 9/5/2024 11:03:02 PM        GI:  No orders to display           Results from last 7 days   Lab Units 09/06/24  0516 09/05/24 2220 09/04/24  1930 09/03/24  0609 09/02/24  0537   WBC Thousand/uL 10.23* 8.78 8.91 5.53 5.64   HEMOGLOBIN g/dL 15.2 16.1 16.5 15.4 15.5   HEMATOCRIT % 42.6 44.9 45.3 43.0 43.7   PLATELETS Thousands/uL 155 162 152 148* 144*   TOTAL NEUT ABS Thousands/µL 8.13* 7.58 6.71 3.69 3.53         Results from last 7 days   Lab Units 09/06/24 0516  "09/05/24 0448 09/04/24 1930 09/03/24 0609 09/02/24 0537   SODIUM mmol/L 137 138 134* 137 134*   POTASSIUM mmol/L 3.9 4.0 3.9 4.1 4.1   CHLORIDE mmol/L 106 107 100 107 101   CO2 mmol/L 24 24 25 24 25   ANION GAP mmol/L 7 7 9 6 8   BUN mg/dL 11 13 16 20 21   CREATININE mg/dL 0.75 1.21 1.39* 1.33* 1.57*   EGFR ml/min/1.73sq m 92 60 50 53 44   CALCIUM mg/dL 9.1 9.0 9.5 9.0 8.8   MAGNESIUM mg/dL 2.0 2.0  --  2.0 2.3     Results from last 7 days   Lab Units 09/05/24 0448 09/04/24 1930 09/03/24 0609 09/01/24 2026   AST U/L 21 23 16 19   ALT U/L 22 24 17 24   ALK PHOS U/L 43 45 38 44   TOTAL PROTEIN g/dL 6.2* 6.9 5.4* 6.5   ALBUMIN g/dL 4.0 4.4 3.7 4.2   TOTAL BILIRUBIN mg/dL 0.95 1.09* 1.19* 1.32*   BILIRUBIN DIRECT mg/dL  --   --  0.18 0.26*         Results from last 7 days   Lab Units 09/06/24 0516 09/05/24 0448 09/04/24 1930 09/03/24 0609 09/02/24 0537 09/01/24 2026   GLUCOSE RANDOM mg/dL 134 108 116 107 103 105             No results found for: \"BETA-HYDROXYBUTYRATE\"                   Results from last 7 days   Lab Units 09/05/24 0448 09/04/24 2253 09/04/24 1930   HS TNI 0HR ng/L  --   --  3   HS TNI 2HR ng/L  --  3  --    HSTNI D2 ng/L  --  0  --    HS TNI 4HR ng/L 3  --   --    HSTNI D4 ng/L 0  --   --                                                      Results from last 7 days   Lab Units 09/02/24 0537 09/01/24 2026   LIPASE u/L 17 196*                 Results from last 7 days   Lab Units 09/04/24 1934 09/01/24 2036   CLARITY UA  Clear Clear   COLOR UA  Colorless Light Yellow   SPEC GRAV UA  1.010 1.015   PH UA  6.0 6.5   GLUCOSE UA mg/dl Negative Negative   KETONES UA mg/dl Negative Negative   BLOOD UA  Large* Small*   PROTEIN UA mg/dl Negative Negative   NITRITE UA  Negative Negative   BILIRUBIN UA  Negative Negative   UROBILINOGEN UA (BE) mg/dl <2.0 <2.0   LEUKOCYTES UA  Negative Negative   WBC UA /hpf None Seen None Seen   RBC UA /hpf None Seen 0-5   BACTERIA UA /hpf None Seen None Seen "   EPITHELIAL CELLS WET PREP /hpf None Seen Occasional       Medications:   Scheduled Medications:  atorvastatin, 80 mg, Oral, Daily With Dinner  ezetimibe, 10 mg, Oral, Daily  lisinopril, 5 mg, Oral, Daily  melatonin, 3 mg, Oral, HS  metoprolol succinate, 50 mg, Oral, Daily  multivitamin-minerals, 1 tablet, Oral, Daily  tamsulosin, 0.4 mg, Oral, Daily With Dinner      Continuous IV Infusions:  sodium chloride, 100 mL/hr, Intravenous, Continuous      PRN Meds:  acetaminophen, 650 mg, Oral, Q6H PRN  aluminum-magnesium hydroxide-simethicone, 30 mL, Oral, Q6H PRN  HYDROmorphone, 0.2 mg, Intravenous, Q3H PRN  labetalol, 10 mg, Intravenous, Q4H PRN  ondansetron, 4 mg, Intravenous, Q6H PRN        Discharge Plan: home no needs     Network Utilization Review Department  ATTENTION: Please call with any questions or concerns to [phone] and carefully listen to the prompts so that you are directed to the right person. All voicemails are confidential.   For Discharge needs, contact Care Management DC Support Team at [phone] opt. [phone]  Send all requests for admission clinical reviews, approved or denied determinations and any other requests to dedicated fax number below belonging to the campus where the patient is receiving treatment. List of dedicated fax numbers for the Facilities:  FACILITY NAME UR FAX NUMBER   ADMISSION DENIALS (Administrative/Medical Necessity) [phone]   DISCHARGE SUPPORT TEAM (NETWORK) [phone]   PARENT CHILD HEALTH (Maternity/NICU/Pediatrics) [phone]   [facility] [phone]   [facility] [phone]   [facility] [phone]   [facility] [phone]   [facility] [phone]   [facility] [phone]   [facility] [phone]   [facility] [phone]   [facility]  [facility] [phone]   [facility] [phone]   [facility] [phone]   [facility] [phone]

## 2024-09-06 NOTE — DISCHARGE SUMMARY
[facility]  Discharge- Jordin Figueroa 1955, 69 y.o. male MRN: [id number]  Unit/Bed#: 2 [place] [address] Encounter: [id number]  Primary Care Provider: Samir Jacome DO   Date and time admitted to hospital: 9/4/2024  7:09 PM    * Left ureteral calculus  Assessment & Plan  Patient presents with localized left lower abdomen pain caused by known 7 mm obstructing left ureteral calculus.   Chart reviewed.  Patient was hospitalized 9/2-9/3 for 7mm left ureteral calculus with mild left hydronephrosis and ANIVAL.  Patient was discharged with outpatient cystoscopy scheduled on 9/5.  Patient had follow-up with urology in the office morning of admission.  Was sent to ED by urology due to uncontrolled pain.  Creatinine today 0.75.  Improved from 1.84 on 9/1  Patient initially denied any hematuria, dysuria  UA shows large blood only.  WBC normal, no bands, patient afebrile  Patient received IV hydration during hospitalization  Continue Flomax, Vesicare added as per urology  Patient underwent cystoscopy with left stent placement, unable to retrieve stone at this time, plan for outpatient procedure in approximately 2 weeks for removal of stone.  Patient is to follow-up with urology outpatient 1 to 2 weeks postdischarge.  Patient is also advised to follow-up with his PCP 1 to 2 weeks postdischarge      Stage 2 chronic kidney disease  Assessment & Plan  Lab Results   Component Value Date    EGFR 92 09/06/2024    EGFR 60 09/05/2024    EGFR 50 09/04/2024    CREATININE 0.75 09/06/2024    CREATININE 1.21 09/05/2024    CREATININE 1.39 (H) 09/04/2024   Stable.   At baseline   Follow-up outpatient PCP 1 to 2 weeks postdischarge    Elevated bilirubin  Assessment & Plan  Resolved    Coronary artery disease involving native coronary artery  Assessment & Plan  Status post PCI with stent x 1 in 1999 and quadruple bypass in 2009  Continue aspirin statin metoprolol  Denies chest pain  Follow-up outpatient PCP 1 to 2 weeks  postdischarge    Primary hypertension  Assessment & Plan  Was on lisinopril 5 mg p.o. daily at home.  It had been held since 9/2 due to ANIVAL.  /100 on ED arrival.  Patient reports mild dizziness this afternoon with associated tingling to both hands.  Denies headache.  BP improved to 152/73 after receiving pain medication.  Resumed home medications   Follow-up outpatient PCP 1 to 2 weeks postdischarge      BMI 28.0-28.9,adult  Assessment & Plan  Diet and lifestyle modification        Medical Problems       Resolved Problems  Date Reviewed: 9/6/2024            Resolved    ANIVAL (acute kidney injury) (HCC) 9/5/2024     Resolved by  Chuy Lopez MD        Discharging Physician / Practitioner: BETY Keyes  PCP: Samir Jacome DO  Admission Date:   Admission Orders (From admission, onward)       Ordered        09/05/24 1251  INPATIENT ADMISSION  Once            09/04/24 2032  Place in Observation  Once                          Discharge Date: 09/06/24    Consultations During Hospital Stay:  Urology     Procedures Performed:   Cystoscopy with left ureteral stent placement    Significant Findings / Test Results:   None     Incidental Findings:   None        Test Results Pending at Discharge (will require follow up):   None      Outpatient Tests Requested:  None     Complications:  None     Reason for Admission: Left lower abdominal pain.     Hospital Course:   Jordin Figueroa is a 69 y.o. male patient PMH of CAD, HTN, HLD and migraine who originally presented to the hospital on 9/4/2024 due to left lower abdominal pain with a known 7 mm obstructing left ureteral calculus.  Patient reported constant pain sharp in nature with associated nausea.  He reported he was discharged day prior to presentation, and had a scheduled cystoscopy with urology outpatient on 9/5.  The patient was having severe pain in the afternoon, called his urology office and was advised to come into the emergency department for  "pain control.  Patient also reported some mild dizziness that started in the afternoon with some associated tingling sensation to both hands.  Patient reported he had not been taking lisinopril for 3 days due to procedure pending.  He was noted to have elevated blood pressure which did resolve after taking blood pressure medication.  He underwent cystoscopy on 9/5 with urology, stent was placed however stone was not able to be retrieved.  Plan is for patient to be discharged to home, will be taking Vesicare in addition to his Flomax and plan for outpatient cystoscopy in approximately 2 weeks with urology.  Patient is advised to follow-up with his primary care physician 1 to 2 weeks postdischarge.  He will continue to monitor his urine which is now blood-tinged no clots.  If he has difficulty urinating or sees an increase of blood or clots he is aware to call the urology office for further direction.  Patient is discharged to home today.            Please see above list of diagnoses and related plan for additional information.     Condition at Discharge: good    Discharge Day Visit / Exam:     Subjective:     Patient seen sitting up in bed resting comfortably, feels that his urine is significantly lighter in color than previous evening.  Reports he had 1 clot last night no further.  Overall is feeling good, no nausea or vomiting.  He does voice some concerns regarding pain management when he goes home, we discussed a short prescription of pain medication which he is agreeable to.    Vitals: Blood Pressure: 139/82 (09/06/24 0759)  Pulse: 92 (09/06/24 0759)  Temperature: 97.9 °F (36.6 °C) (09/06/24 0759)  Temp Source: Oral (09/05/24 1451)  Respirations: 18 (09/06/24 0759)  Height: 5' 9\" (175.3 cm) (09/04/24 2300)  Weight - Scale: 88.5 kg (195 lb) (09/04/24 2300)  SpO2: 96 % (09/06/24 0759)    Exam:   Physical Exam  Vitals and nursing note reviewed.   Constitutional:       Appearance: Normal appearance.   HENT:      " "Head: Normocephalic.      Nose: Nose normal.      Mouth/Throat:      Mouth: Mucous membranes are moist.   Eyes:      Extraocular Movements: Extraocular movements intact.      Conjunctiva/sclera: Conjunctivae normal.      Pupils: Pupils are equal, round, and reactive to light.   Cardiovascular:      Rate and Rhythm: Normal rate and regular rhythm.      Pulses: Normal pulses.      Heart sounds: Normal heart sounds.   Pulmonary:      Effort: Pulmonary effort is normal.      Breath sounds: Normal breath sounds.   Abdominal:      General: Bowel sounds are normal. There is no distension.      Palpations: Abdomen is soft.      Tenderness: There is no abdominal tenderness.      Comments: Reports occasional \"twinge of pain in left flank region\".    Genitourinary:     Comments: Voiding blood tinged urine; reports lighter than yesterday evening; no clots.   Musculoskeletal:         General: Normal range of motion.      Cervical back: Normal range of motion.   Skin:     General: Skin is warm and dry.      Capillary Refill: Capillary refill takes less than 2 seconds.   Neurological:      General: No focal deficit present.      Mental Status: He is alert and oriented to person, place, and time.   Psychiatric:         Mood and Affect: Mood normal.         Behavior: Behavior normal.         Thought Content: Thought content normal.         Judgment: Judgment normal.          Discussion with Family:  Patient indicated that he would call family.     Discharge instructions/Information to patient and family:   See after visit summary for information provided to patient and family.      Provisions for Follow-Up Care:  See after visit summary for information related to follow-up care and any pertinent home health orders.      Mobility at time of Discharge:   Basic Mobility Inpatient Raw Score: 24  JH-HLM Goal: 8: Walk 250 feet or more  JH-HLM Achieved: 8: Walk 250 feet ot more  HLM Goal achieved. Continue to encourage appropriate " mobility.     Disposition:   Home    Planned Readmission: No     Discharge Statement:  I spent greater than 45 minutes discharging the patient. This time was spent on the day of discharge. I had direct contact with the patient on the day of discharge. Greater than 50% of the total time was spent examining patient, answering all patient questions, arranging and discussing plan of care with patient as well as directly providing post-discharge instructions.  Additional time then spent on discharge activities.    Discharge Medications:  See after visit summary for reconciled discharge medications provided to patient and/or family.      **Please Note: This note may have been constructed using a voice recognition system**

## 2024-09-07 ENCOUNTER — TELEPHONE (OUTPATIENT)
Dept: FAMILY MEDICINE CLINIC | Facility: CLINIC | Age: 69
End: 2024-09-07

## 2024-09-08 NOTE — TELEPHONE ENCOUNTER
----- Message from BETY Monzon sent at 9/6/2024  9:43 AM EDT -----  Thank you for allowing us to participate in the care of your patient, Jordin Figueroa, who was hospitalized from 9/4/2024 through 9/6/2024 with the admitting diagnosis of kidney stone and ANIVAL.  Patient went to the OR on 9/5, stent was placed however stone was not able to be removed at that time.  Plan is to follow-up with outpatient urology in about 2 weeks to have repeat cystoscopy.  He is placed on Vesicare in addition to his Flomax.  We are advising him to keep his appointment with you on Wednesday, September 11 for follow-up.  Patient's ANIVAL improved, creatinine is back down to normal range 0.79.    Medication Changes:  Added Vesicare for 15 days    Outpatient testing recommended:  None    If you have any additional questions or would like to discuss further, please feel free to contact me.    BETY Keyes  [facility] Internal Medicine, Hospitalist  [phone]

## 2024-09-11 ENCOUNTER — OFFICE VISIT (OUTPATIENT)
Dept: FAMILY MEDICINE CLINIC | Facility: CLINIC | Age: 69
End: 2024-09-11
Payer: COMMERCIAL

## 2024-09-11 VITALS
HEART RATE: 82 BPM | TEMPERATURE: 96 F | HEIGHT: 69 IN | DIASTOLIC BLOOD PRESSURE: 70 MMHG | RESPIRATION RATE: 16 BRPM | WEIGHT: 189.4 LBS | BODY MASS INDEX: 28.05 KG/M2 | SYSTOLIC BLOOD PRESSURE: 132 MMHG

## 2024-09-11 DIAGNOSIS — N18.2 STAGE 2 CHRONIC KIDNEY DISEASE: ICD-10-CM

## 2024-09-11 DIAGNOSIS — I10 PRIMARY HYPERTENSION: ICD-10-CM

## 2024-09-11 DIAGNOSIS — N20.1 LEFT URETERAL CALCULUS: Primary | ICD-10-CM

## 2024-09-11 PROCEDURE — 99496 TRANSJ CARE MGMT HIGH F2F 7D: CPT | Performed by: FAMILY MEDICINE

## 2024-09-11 RX ORDER — TAMSULOSIN HYDROCHLORIDE 0.4 MG/1
0.4 CAPSULE ORAL
Qty: 30 CAPSULE | Refills: 1 | Status: SHIPPED | OUTPATIENT
Start: 2024-09-11

## 2024-09-11 RX ORDER — SOLIFENACIN SUCCINATE 5 MG/1
5 TABLET, FILM COATED ORAL DAILY
Qty: 30 TABLET | Refills: 1 | Status: SHIPPED | OUTPATIENT
Start: 2024-09-11

## 2024-09-11 NOTE — PROGRESS NOTES
Assessment/Plan:    No problem-specific Assessment & Plan notes found for this encounter.    Left ureter stone with stent  F/u urology  Offer [facility] uro in future  Refilled flomax/vesicare but plan dc if not needed after passing stone    Increase fluids  Stone analysis when possible    Htn stable    Ckd2, s/p max, improving     Diagnoses and all orders for this visit:    Left ureteral calculus  -     solifenacin (VESICARE) 5 mg tablet; Take 1 tablet (5 mg total) by mouth daily  -     tamsulosin (FLOMAX) 0.4 mg; Take 1 capsule (0.4 mg total) by mouth daily with dinner    Primary hypertension    Stage 2 chronic kidney disease        Return if symptoms worsen or fail to improve.    Subjective:      Patient ID: Jordin Figueroa is a 69 y.o. male.    No chief complaint on file.      HPI  Has f/u appt next Tuesday with Dr. Gunderson  Surgery planned     No fever  No hematuria  No abx  Has good flow  No pain currently, some discomfort left flank  On flomax and vesicare only for stone passage  First stone ever, composition unknown    TCM Call       Date and time call was made  9/6/2024 11:32 AM    Hospital care reviewed  Records reviewed    Patient [facility] hospitial[facility] at  [facility]    Date of Admission  09/04/24    Date of discharge  09/06/24    Diagnosis  Left ureteral calculus    Disposition  Home    Were the patients medications reviewed and updated  No  He declined and knows to call if any questions    Current Symptoms  None          TCM Call       Post hospital issues  None    Should patient be enrolled in anticoag monitoring?  No    Scheduled for follow up?  Yes    Patients specialists  Urologist    Urologist name  Dr Gunderson    Did you obtain your prescribed medications  Yes    Do you need help managing your prescriptions or medications  No    Is transportation to your appointment needed  No    I have advised the patient to call PCP with any new or worsening symptoms  Adonis Katz    Living Arrangements  Spouse or  Significiant other    Support System  Family    The type of support provided  Physical; Emotional    Do you have social support  Yes, as much as I need    Are you recieving any outpatient services  No    Are you recieving home care services  No    Interperter language line needed  No    Counseling  Patient    Counseling topics  Activities of daily living; Importance of RX compliance; patient and family education; instructions for management; Risk factor reduction    Comments  Jordin states that he just got home from the hospital but he is doing fine. No c/o. He will follow up with Dr Gunderson and we reviewed his discharge instructions [id number]            The following portions of the patient's history were reviewed and updated as appropriate: allergies, current medications, past family history, past medical history, past social history, past surgical history and problem list.    Review of Systems   Constitutional:  Negative for chills and fever.   Genitourinary:  Negative for difficulty urinating.         Current Outpatient Medications   Medication Sig Dispense Refill    Ascorbic Acid (Vitamin C) 500 MG CAPS once a week On Saturday      aspirin (ECOTRIN LOW STRENGTH) 81 mg EC tablet Take 81 mg by mouth every morning      Echinacea 500 MG CAPS once a week Saturday      ezetimibe (ZETIA) 10 mg tablet Take 1 tablet (10 mg total) by mouth daily 90 tablet 1    Ginkgo Biloba 120 MG CAPS once a week Saturday      lisinopril (ZESTRIL) 5 mg tablet Take 1 tablet (5 mg total) by mouth daily Do not start before September 5, 2024. 90 tablet 0    metoprolol succinate (TOPROL-XL) 50 mg 24 hr tablet Take 1 tablet (50 mg total) by mouth daily 90 tablet 1    Multiple Vitamins-Minerals (CENTRUM SILVER 50+MEN PO) every morning      Omega-3 Fatty Acids (OMEGA 3 PO) once a week On Saturday      oxyCODONE (Roxicodone) 5 immediate release tablet Take 1 tablet (5 mg total) by mouth every 4 (four) hours as needed for moderate pain for up to 5  "days Max Daily Amount: 30 mg 30 tablet 0    rosuvastatin (CRESTOR) 40 MG tablet Take 1 tablet (40 mg total) by mouth every morning 90 tablet 1    solifenacin (VESICARE) 5 mg tablet Take 1 tablet (5 mg total) by mouth daily 30 tablet 1    tamsulosin (FLOMAX) 0.4 mg Take 1 capsule (0.4 mg total) by mouth daily with dinner 30 capsule 1    vitamin B-12 (VITAMIN B-12) 1,000 mcg tablet once a week On Saturday       No current facility-administered medications for this visit.       Objective:    /70   Pulse 82   Temp (!) 96 °F (35.6 °C)   Resp 16   Ht 5' 9\" (1.753 m)   Wt 85.9 kg (189 lb 6.4 oz)   BMI 27.97 kg/m²        Physical Exam  Vitals and nursing note reviewed.   Constitutional:       General: He is not in acute distress.     Appearance: He is well-developed. He is not ill-appearing.   HENT:      Head: Normocephalic.   Eyes:      General: No scleral icterus.     Conjunctiva/sclera: Conjunctivae normal.   Cardiovascular:      Rate and Rhythm: Normal rate and regular rhythm.   Pulmonary:      Effort: Pulmonary effort is normal. No respiratory distress.      Breath sounds: No wheezing.   Abdominal:      Palpations: Abdomen is soft.      Tenderness: There is no right CVA tenderness or left CVA tenderness.   Musculoskeletal:         General: No deformity.      Cervical back: Neck supple.   Skin:     General: Skin is warm and dry.      Coloration: Skin is not pale.   Neurological:      Mental Status: He is alert.      Gait: Gait normal.   Psychiatric:         Mood and Affect: Mood normal.         Behavior: Behavior normal.         Thought Content: Thought content normal.                Samir Jacome,     "

## 2024-09-12 ENCOUNTER — ANESTHESIA EVENT (OUTPATIENT)
Dept: PERIOP | Facility: HOSPITAL | Age: 69
End: 2024-09-12
Payer: COMMERCIAL

## 2024-09-12 NOTE — PRE-PROCEDURE INSTRUCTIONS
Pre-Surgery Instructions:   Medication Instructions    Ascorbic Acid (Vitamin C) 500 MG CAPS Stop taking 7 days prior to surgery.    aspirin (ECOTRIN LOW STRENGTH) 81 mg EC tablet Instructions provided by MD 2 day hold , last dose 9/17    Echinacea 500 MG CAPS Stop taking 7 days prior to surgery.    ezetimibe (ZETIA) 10 mg tablet Take day of surgery.    Ginkgo Biloba 120 MG CAPS Take day of surgery.    lisinopril (ZESTRIL) 5 mg tablet Take day of surgery.    metoprolol succinate (TOPROL-XL) 50 mg 24 hr tablet Take day of surgery.    Multiple Vitamins-Minerals (CENTRUM SILVER 50+MEN PO) Stop taking 7 days prior to surgery.    Omega-3 Fatty Acids (OMEGA 3 PO) Stop taking 7 days prior to surgery.    rosuvastatin (CRESTOR) 40 MG tablet Take day of surgery.    solifenacin (VESICARE) 5 mg tablet Uses PRN- OK to take day of surgery    tamsulosin (FLOMAX) 0.4 mg Take night before surgery    vitamin B-12 (VITAMIN B-12) 1,000 mcg tablet Stop taking 7 days prior to surgery.   Medication instructions for day surgery reviewed. Please use only a sip of water to take your instructed medications. Avoid all over the counter vitamins, supplements and NSAIDS for one week prior to surgery per anesthesia guidelines. Tylenol is ok to take as needed.     You will receive a call one business day prior to surgery with an arrival time and hospital directions. If your surgery is scheduled on a Monday, the hospital will be calling you on the Friday prior to your surgery. If you have not heard from anyone by 8pm, please call the hospital supervisor through the hospital  at [phone]. ([place] [phone] or [place] [phone]).    Do not eat or drink anything after midnight the night before your surgery, including candy, mints, lifesavers, or chewing gum. Do not drink alcohol 24hrs before your surgery. Try not to smoke at least 24hrs before your surgery.       Follow the pre surgery showering instructions as listed in the “My  Surgical Experience Booklet” or otherwise provided by your surgeon's office. Do not use a blade to shave the surgical area 1 week before surgery. It is okay to use a clean electric clippers up to 24 hours before surgery. Do not apply any lotions, creams, including makeup, cologne, deodorant, or perfumes after showering on the day of your surgery. Do not use dry shampoo, hair spray, hair gel, or any type of hair products.     No contact lenses, eye make-up, or artificial eyelashes. Remove nail polish, including gel polish, and any artificial, gel, or acrylic nails if possible. Remove all jewelry including rings and body piercing jewelry.     Wear causal clothing that is easy to take on and off. Consider your type of surgery.    Keep any valuables, jewelry, piercings at home. Please bring any specially ordered equipment (sling, braces) if indicated.    Arrange for a responsible person to drive you to and from the hospital on the day of your surgery. Please confirm the visitor policy for the day of your procedure when you receive your phone call with an arrival time.     Call the surgeon's office with any new illnesses, exposures, or additional questions prior to surgery.    Please reference your “My Surgical Experience Booklet” for additional information to prepare for your upcoming surgery.      Pt. Verbalized an understanding of all instructions reviewed and offers no concerns at this time.

## 2024-09-18 NOTE — H&P
H&P Exam - Urology       Patient: Jordin Figueroa   : 1955 Sex: male   MRN: [id number]     CSN: [id number]      History of Present Illness   HPI:  Jordin Figueroa is a 69 y.o. male who presents with a left renal stone left stent status post cystoscopy left ureteroscopy stoma Nappe stent placement 2 weeks ago for severe pain        Review of Systems:   Constitutional:  Negative for activity change, fever, chills and diaphoresis.   HENT: Negative for hearing loss and trouble swallowing.   Eyes: Negative for itching and visual disturbance.   Respiratory: Negative for chest tightness and shortness of breath.   Cardiovascular: Negative for chest pain, edema.   Gastrointestinal: Negative for abdominal distention, na abdominal pain, constipation, diarrhea, Nausea and vomiting.   Genitourinary: Negative for decreased urine volume, difficulty urinating, dysuria, enuresis, frequency, hematuria and urgency.   Musculoskeletal: Negative for gait problem and myalgias.   Neurological: Negative for dizziness and headaches.   Hematological: Does not bruise/bleed easily.       Historical Information   Past Medical History:   Diagnosis Date    ANIVAL (acute kidney injury) ([facility]) 2024    Coronary artery disease     HTN (hypertension)     Hyperlipidemia     Hypertension     Migraine     Myocardial infarction (HCC)     one stent     Past Surgical History:   Procedure Laterality Date    ANGIOPLASTY      one stent    COLONOSCOPY      CORONARY ARTERY BYPASS GRAFT  2009    x4    FL RETROGRADE PYELOGRAM  2024    LASIK      AK CYSTO/URETERO W/LITHOTRIPSY &INDWELL STENT INSRT Left 2024    Procedure: CYSTOSCOPY URETEROSCOPY, STONE MANIPULATION, RETROGRADE PYELOGRAM AND INSERTION STENT URETERAL;  Surgeon: Jersey Gunderson MD;  Location: [facility] OR;  Service: Urology     Social History   Social History     Substance and Sexual Activity   Alcohol Use Yes    Comment: seldom     Social History     Substance and Sexual  "Activity   Drug Use Never     Social History     Tobacco Use   Smoking Status Never    Passive exposure: Past   Smokeless Tobacco Never     Family History:   Family History   Problem Relation Age of Onset    Alzheimer's disease Mother     Other Mother         alzheimers    Cancer Father         rectal    Stroke Father     Rectal cancer Father        Meds/Allergies   No medications prior to admission.  No Known Allergies    Objective   Vitals: Ht 5' 9\" (1.753 m)   Wt 85.7 kg (189 lb)   BMI 27.91 kg/m²     Physical Exam:  General Alert awake   Normocephalic atraumatic PERRLA  Lungs clear bilaterally  Cardiac normal S1 normal S2  Abdomen soft, flank pain  Extremities no edema    No intake/output data recorded.    Invasive Devices       Drain  Duration             Ureteral Internal Stent Left ureter 6 Fr. 13 days                        Lab Results: CBC:   Lab Results   Component Value Date    WBC 10.23 (H) 09/06/2024    HGB 15.2 09/06/2024    HCT 42.6 09/06/2024    MCV 83 09/06/2024     09/06/2024    RBC 5.13 09/06/2024    MCH 29.6 09/06/2024    MCHC 35.7 09/06/2024    RDW 13.1 09/06/2024    MPV 9.2 09/06/2024    NRBC 0 09/06/2024     CMP:   Lab Results   Component Value Date     09/06/2024    CO2 24 09/06/2024    BUN 11 09/06/2024    CREATININE 0.75 09/06/2024    CALCIUM 9.1 09/06/2024    AST 21 09/05/2024    ALT 22 09/05/2024    ALKPHOS 43 09/05/2024    EGFR 92 09/06/2024     Urinalysis:   Lab Results   Component Value Date    COLORU Colorless 09/04/2024    CLARITYU Clear 09/04/2024    SPECGRAV 1.010 09/04/2024    PHUR 6.0 09/04/2024    LEUKOCYTESUR Negative 09/04/2024    NITRITE Negative 09/04/2024    GLUCOSEU Negative 09/04/2024    KETONESU Negative 09/04/2024    BILIRUBINUR Negative 09/04/2024    BLOODU Large (A) 09/04/2024     Urine Culture: No results found for: \"URINECX\"  PSA:   Lab Results   Component Value Date    PSA 0.736 08/01/2024    PSA 0.69 08/01/2023    PSA 0.7 04/19/2022    PSA 0.7 " 01/02/2020           Assessment/ Plan:  Cystoscopy left ureteroscopy laser stent      Jersey Gunderson MD

## 2024-09-19 ENCOUNTER — HOSPITAL ENCOUNTER (OUTPATIENT)
Facility: HOSPITAL | Age: 69
Setting detail: OUTPATIENT SURGERY
Discharge: HOME | End: 2024-09-19
Attending: SPECIALIST | Admitting: SPECIALIST
Payer: COMMERCIAL

## 2024-09-19 ENCOUNTER — APPOINTMENT (OUTPATIENT)
Dept: RADIOLOGY | Facility: HOSPITAL | Age: 69
End: 2024-09-19
Payer: COMMERCIAL

## 2024-09-19 ENCOUNTER — ANESTHESIA (OUTPATIENT)
Dept: PERIOP | Facility: HOSPITAL | Age: 69
End: 2024-09-19
Payer: COMMERCIAL

## 2024-09-19 VITALS
HEART RATE: 75 BPM | BODY MASS INDEX: 28.05 KG/M2 | DIASTOLIC BLOOD PRESSURE: 80 MMHG | TEMPERATURE: 97.1 F | WEIGHT: 189.38 LBS | SYSTOLIC BLOOD PRESSURE: 150 MMHG | RESPIRATION RATE: 18 BRPM | OXYGEN SATURATION: 96 % | HEIGHT: 69 IN

## 2024-09-19 DIAGNOSIS — N20.0 CALCULUS OF KIDNEY: Primary | ICD-10-CM

## 2024-09-19 DIAGNOSIS — N20.0 KIDNEY STONE: ICD-10-CM

## 2024-09-19 PROCEDURE — 74018 RADEX ABDOMEN 1 VIEW: CPT

## 2024-09-19 PROCEDURE — C1894 INTRO/SHEATH, NON-LASER: HCPCS | Performed by: SPECIALIST

## 2024-09-19 PROCEDURE — C1769 GUIDE WIRE: HCPCS | Performed by: SPECIALIST

## 2024-09-19 PROCEDURE — C1758 CATHETER, URETERAL: HCPCS | Performed by: SPECIALIST

## 2024-09-19 PROCEDURE — C2617 STENT, NON-COR, TEM W/O DEL: HCPCS | Performed by: SPECIALIST

## 2024-09-19 PROCEDURE — 82360 CALCULUS ASSAY QUANT: CPT | Performed by: SPECIALIST

## 2024-09-19 PROCEDURE — C1747 URETEROSCOPE DIGITAL FLEX SNGL USE STD DEFLECTION APTRA: HCPCS | Performed by: SPECIALIST

## 2024-09-19 DEVICE — INLAY URETERAL STENT W/O GUIDEWIRE
Type: IMPLANTABLE DEVICE | Site: URINARY BLADDER | Status: FUNCTIONAL
Brand: BARD® INLAY® URETERAL STENT

## 2024-09-19 RX ORDER — PROPOFOL 10 MG/ML
INJECTION, EMULSION INTRAVENOUS AS NEEDED
Status: DISCONTINUED | OUTPATIENT
Start: 2024-09-19 | End: 2024-09-19

## 2024-09-19 RX ORDER — AMOXICILLIN AND CLAVULANATE POTASSIUM 500; 125 MG/1; MG/1
1 TABLET, FILM COATED ORAL 2 TIMES DAILY
COMMUNITY
Start: 2024-09-17

## 2024-09-19 RX ORDER — ONDANSETRON 2 MG/ML
4 INJECTION INTRAMUSCULAR; INTRAVENOUS ONCE AS NEEDED
Status: DISCONTINUED | OUTPATIENT
Start: 2024-09-19 | End: 2024-09-19 | Stop reason: HOSPADM

## 2024-09-19 RX ORDER — CEFAZOLIN SODIUM 2 G/50ML
SOLUTION INTRAVENOUS AS NEEDED
Status: DISCONTINUED | OUTPATIENT
Start: 2024-09-19 | End: 2024-09-19

## 2024-09-19 RX ORDER — FENTANYL CITRATE 50 UG/ML
INJECTION, SOLUTION INTRAMUSCULAR; INTRAVENOUS AS NEEDED
Status: DISCONTINUED | OUTPATIENT
Start: 2024-09-19 | End: 2024-09-19

## 2024-09-19 RX ORDER — DEXAMETHASONE SODIUM PHOSPHATE 4 MG/ML
INJECTION, SOLUTION INTRA-ARTICULAR; INTRALESIONAL; INTRAMUSCULAR; INTRAVENOUS; SOFT TISSUE AS NEEDED
Status: DISCONTINUED | OUTPATIENT
Start: 2024-09-19 | End: 2024-09-19

## 2024-09-19 RX ORDER — DEXAMETHASONE SODIUM PHOSPHATE 10 MG/ML
INJECTION, SOLUTION INTRAMUSCULAR; INTRAVENOUS AS NEEDED
Status: DISCONTINUED | OUTPATIENT
Start: 2024-09-19 | End: 2024-09-19

## 2024-09-19 RX ORDER — FENTANYL CITRATE/PF 50 MCG/ML
50 SYRINGE (ML) INJECTION
Status: DISCONTINUED | OUTPATIENT
Start: 2024-09-19 | End: 2024-09-19 | Stop reason: HOSPADM

## 2024-09-19 RX ORDER — CEFAZOLIN SODIUM 2 G/50ML
2000 SOLUTION INTRAVENOUS ONCE
Status: DISCONTINUED | OUTPATIENT
Start: 2024-09-19 | End: 2024-09-19 | Stop reason: HOSPADM

## 2024-09-19 RX ORDER — SODIUM CHLORIDE, SODIUM LACTATE, POTASSIUM CHLORIDE, CALCIUM CHLORIDE 600; 310; 30; 20 MG/100ML; MG/100ML; MG/100ML; MG/100ML
125 INJECTION, SOLUTION INTRAVENOUS CONTINUOUS
Status: CANCELLED | OUTPATIENT
Start: 2024-09-19

## 2024-09-19 RX ORDER — LIDOCAINE HYDROCHLORIDE 10 MG/ML
INJECTION, SOLUTION EPIDURAL; INFILTRATION; INTRACAUDAL; PERINEURAL AS NEEDED
Status: DISCONTINUED | OUTPATIENT
Start: 2024-09-19 | End: 2024-09-19

## 2024-09-19 RX ORDER — SODIUM CHLORIDE, SODIUM LACTATE, POTASSIUM CHLORIDE, CALCIUM CHLORIDE 600; 310; 30; 20 MG/100ML; MG/100ML; MG/100ML; MG/100ML
100 INJECTION, SOLUTION INTRAVENOUS CONTINUOUS
Status: DISCONTINUED | OUTPATIENT
Start: 2024-09-19 | End: 2024-09-19 | Stop reason: HOSPADM

## 2024-09-19 RX ORDER — ONDANSETRON 2 MG/ML
INJECTION INTRAMUSCULAR; INTRAVENOUS AS NEEDED
Status: DISCONTINUED | OUTPATIENT
Start: 2024-09-19 | End: 2024-09-19

## 2024-09-19 RX ADMIN — FENTANYL CITRATE 25 MCG: 50 INJECTION, SOLUTION INTRAMUSCULAR; INTRAVENOUS at 10:02

## 2024-09-19 RX ADMIN — SODIUM CHLORIDE, SODIUM LACTATE, POTASSIUM CHLORIDE, AND CALCIUM CHLORIDE: .6; .31; .03; .02 INJECTION, SOLUTION INTRAVENOUS at 09:50

## 2024-09-19 RX ADMIN — LIDOCAINE HYDROCHLORIDE 50 MG: 10 INJECTION, SOLUTION EPIDURAL; INFILTRATION; INTRACAUDAL; PERINEURAL at 09:57

## 2024-09-19 RX ADMIN — CEFAZOLIN SODIUM 2000 MG: 2 SOLUTION INTRAVENOUS at 09:53

## 2024-09-19 RX ADMIN — DEXAMETHASONE SODIUM PHOSPHATE 10 MG: 10 INJECTION, SOLUTION INTRAMUSCULAR; INTRAVENOUS at 10:01

## 2024-09-19 RX ADMIN — PROPOFOL 200 MG: 10 INJECTION, EMULSION INTRAVENOUS at 09:57

## 2024-09-19 RX ADMIN — ONDANSETRON 4 MG: 2 INJECTION INTRAMUSCULAR; INTRAVENOUS at 10:01

## 2024-09-19 NOTE — ANESTHESIA POSTPROCEDURE EVALUATION
Post-Op Assessment Note    CV Status:  Stable  Pain Score: 0    Pain management: adequate       Mental Status:  Arousable and sleepy   Hydration Status:  Stable   PONV Controlled:  None   Airway Patency:  Patent  Two or more mitigation strategies used for obstructive sleep apnea   Post Op Vitals Reviewed: Yes    No anethesia notable event occurred.    Staff: CRNA               BP   140/80   Temp (!) 97.1 °F (36.2 °C) (09/19/24 1055)    Pulse 81   Resp 16   SpO2 97

## 2024-09-19 NOTE — PERIOPERATIVE NURSING NOTE
Patient received from PACU, alert and awake.. PO fluids offered. Vitals stable, no c/o pain a this time. Patient is aware the he has to urinate before his discharge . IV line is running.  Call bell within reach. Plan of care in progress.

## 2024-09-19 NOTE — ANESTHESIA PREPROCEDURE EVALUATION
Procedure:  CYSTOSCOPY URETEROSCOPY WITH LITHOTRIPSY HOLMIUM LASER, RETROGRADE PYELOGRAM AND INSERTION STENT URETERAL (Left: Bladder)    Relevant Problems   ANESTHESIA (within normal limits)      CARDIO   (+) Complicated migraine   (+) Coronary artery disease involving native coronary artery   (+) History of PTCA with stent   (+) History of coronary artery bypass graft (CABGx4 in 2009)   (+) MI (myocardial infarction) (HCC)   (+) Other hyperlipidemia   (+) Primary hypertension      /RENAL   (+) Stage 2 chronic kidney disease      HEMATOLOGY   (+) Thrombocytopenia (HCC)      NEURO/PSYCH   (+) Complicated migraine      PULMONARY (within normal limits)        Physical Exam    Airway    Mallampati score: II  TM Distance: >3 FB  Neck ROM: full     Dental       Cardiovascular  Rhythm: regular, Rate: normal    Pulmonary   Breath sounds clear to auscultation    Other Findings        Anesthesia Plan  ASA Score- 3     Anesthesia Type- general with ASA Monitors.         Additional Monitors:     Airway Plan: LMA.    Comment: Water at 6 am.       Plan Factors-Exercise tolerance (METS): >4 METS.    Chart reviewed. EKG reviewed.  Existing labs reviewed. Patient summary reviewed.    Patient is not a current smoker.              Induction- intravenous.    Postoperative Plan- Plan for postoperative opioid use. Planned trial extubation    Perioperative Resuscitation Plan - Level 1 - Full Code.       Informed Consent- Anesthetic plan and risks discussed with patient.  I personally reviewed this patient with the CRNA. Discussed and agreed on the Anesthesia Plan with the CRNA..

## 2024-09-19 NOTE — OP NOTE
OPERATIVE REPORT  PATIENT NAME: Jordin Figueroa    :  1955  MRN: [id number]  Pt Location: [facility] OR ROOM 04    SURGERY DATE: 2024    Surgeons and Role:     * Jersey Gunderson MD - Primary    Preop Diagnosis:  Kidney stone [N20.0]    Post-Op Diagnosis Codes:     * Kidney stone [N20.0]    Procedure(s):  Left - CYSTOSCOPY URETEROSCOPY WITH LITHOTRIPSY HOLMIUM LASER.  BASKET EXTRACTION. AND INSERTION STENT URETERAL    Specimen(s):  ID Type Source Tests Collected by Time Destination   1 : Left kidney stone [facility], Left STONE ANALYSIS, TISSUE EXAM Jersey Gunderson MD 2024 1039        Estimated Blood Loss:   Minimal    Drains:  Ureteral Internal Stent Left ureter 6 Fr. (Active)   Site Assessment PRIYA 24 1055   Dressing Status Open to air 24 1055   Dressing Type Open to air 24 1055   Number of days: 0       Anesthesia Type:   General/LMA    Operative Indications:  Kidney stone [N20.0]  69-year-old male returning to the OR to undergo cystoscopy left ureteroscopy laser  stent exchange in light of severe distal ureteral stenosis unable to pass ureteroscope and removed stone on last visit    Operative Findings:  9 mm left renal pelvic stone laser fragmentation basket extraction stent exchange      Complications:   None    Procedure and Technique:  The patient identified in the holding area left side marked consent reviewed and placed in the OR suite after anesthesia was induced placed in the thigh position draped and prepped in sterile fashion timeout performed 22 Yoruba scope passed through to the bladder anterior to the malleus posterior to 2.5 cm by lobar prostatic urethra 2.838 wire was passed up into the left renal pelvis with the stent removed first of the safety second cannulated with a 45 cm sheath attempts at passing the sheath by the left orifice were unsuccessful in light of previous stenosis the sheath was removed and the flexible scope was passed up the wire into the left  renal pelvis the wire was removed the stone was encountered laser relation into multiple small fragments basket placed 1 fragment removed for analysis cystoscope replaced over the wire a 24 cm 6 [place]bourgish stent was passed the wire moved scope removed patient procedure awakened taken recovery room stable condition   I was present for the entire procedure.    Patient Disposition:  PACU              SIGNATURE: Jersey Gunderson MD  DATE: September 19, 2024  TIME: 11:12 AM

## 2024-09-19 NOTE — DISCHARGE INSTR - AVS FIRST PAGE
#1 no heavy straining or lifting above 10 pounds for 2 weeks    #2 call office fevers, chills, or worsening blood in the urine.    #3  May have blood in urine may have left flank pain drink lots of water      Jersey Gunderson M.D. [facility] office  [address] [place] [address].  [place] [address]  [phone]  8:30 AM to 4:30 PM  Monday through Friday    [facility]  [address]  [address]  [place] [address] [phone]  1:00 to 5:00 PM  Wednesday

## 2024-09-19 NOTE — PROGRESS NOTES
"Progress Note - Urology      Patient: Jordin Figueroa   : 1955 Sex: male   MRN: [id number]     CSN: [id number]  Unit/Bed#: OR POOL     SUBJECTIV  Patient surgical preop unit no change history physical aware risk of anesthesia infection bleeding postop stent discomfort      Objective   Vitals: /79   Pulse 84   Temp 97.6 °F (36.4 °C) (Temporal)   Resp 16   Ht 5' 9\" (1.753 m)   Wt 85.9 kg (189 lb 6 oz)   SpO2 96%   BMI 27.97 kg/m²     No intake/output data recorded.      Physical Exam:   General Alert awake   Normocephalic atraumatic PERRLA  Lungs clear bilaterally  Cardiac normal S1 normal S2  Abdomen soft, flank pain  Extremities no edema      Lab Results: CBC:   Lab Results   Component Value Date    WBC 10.23 (H) 2024    HGB 15.2 2024    HCT 42.6 2024    MCV 83 2024     2024    RBC 5.13 2024    MCH 29.6 2024    MCHC 35.7 2024    RDW 13.1 2024    MPV 9.2 2024    NRBC 0 2024     CMP:   Lab Results   Component Value Date     2024    CO2 24 2024    BUN 11 2024    CREATININE 0.75 2024    CALCIUM 9.1 2024    AST 21 2024    ALT 22 2024    ALKPHOS 43 2024    EGFR 92 2024     Urinalysis:   Lab Results   Component Value Date    COLORU Colorless 2024    CLARITYU Clear 2024    SPECGRAV 1.010 2024    PHUR 6.0 2024    LEUKOCYTESUR Negative 2024    NITRITE Negative 2024    GLUCOSEU Negative 2024    KETONESU Negative 2024    BILIRUBINUR Negative 2024    BLOODU Large (A) 2024     Urine Culture: No results found for: \"URINECX\"  PSA:   Lab Results   Component Value Date    PSA 0.736 2024    PSA 0.69 2023    PSA 0.7 2022    PSA 0.7 2020         Assessment/ Plan:  Cystoscopy left ureteroscopy laser stent exchange          Jersey Gunderson MD  "

## 2024-09-19 NOTE — PERIOPERATIVE NURSING NOTE
AVS reviewed with patient and his cousin at bed side and all concerns were answered. IV line is taken out. Patient tolerated PO fluids well. Denies any pain at this time. Surgical site dressing is clean, dry and intact. Patient urinated around 300ml . All belongings were sent with patient. Patient left floor with firm understanding of discharge instructions. Patient escorted to her ride via wheelchair by this nurse.

## 2024-09-29 LAB
COLOR STONE: NORMAL
COM MFR STONE: 100 %
COMMENT-STONE3: NORMAL
COMPOSITION: NORMAL
LABORATORY COMMENT REPORT: NORMAL
PHOTO: NORMAL
SIZE STONE: NORMAL MM
SPEC SOURCE SUBJ: NORMAL
STONE ANALYSIS-IMP: NORMAL
STONE ANALYSIS-IMP: NORMAL
WT STONE: 2 MG

## 2024-10-13 DIAGNOSIS — N20.1 LEFT URETERAL CALCULUS: ICD-10-CM

## 2024-10-13 RX ORDER — SOLIFENACIN SUCCINATE 5 MG/1
5 TABLET, FILM COATED ORAL DAILY
Qty: 90 TABLET | Refills: 1 | Status: SHIPPED | OUTPATIENT
Start: 2024-10-13

## 2024-10-16 ENCOUNTER — HOSPITAL ENCOUNTER (OUTPATIENT)
Dept: RADIOLOGY | Facility: HOSPITAL | Age: 69
Discharge: HOME | End: 2024-10-16
Attending: OTOLARYNGOLOGY
Payer: COMMERCIAL

## 2024-10-16 DIAGNOSIS — J32.9 CHRONIC SINUSITIS, UNSPECIFIED LOCATION: ICD-10-CM

## 2024-10-16 PROCEDURE — 70486 CT MAXILLOFACIAL W/O DYE: CPT

## 2024-11-08 DIAGNOSIS — N20.1 LEFT URETERAL CALCULUS: ICD-10-CM

## 2024-11-10 RX ORDER — TAMSULOSIN HYDROCHLORIDE 0.4 MG/1
CAPSULE ORAL
Qty: 90 CAPSULE | Refills: 1 | Status: SHIPPED | OUTPATIENT
Start: 2024-11-10

## 2024-11-19 ENCOUNTER — APPOINTMENT (OUTPATIENT)
Dept: LAB | Facility: CLINIC | Age: 69
End: 2024-11-19
Payer: COMMERCIAL

## 2024-11-19 ENCOUNTER — TRANSCRIBE ORDERS (OUTPATIENT)
Dept: LAB | Facility: CLINIC | Age: 69
End: 2024-11-19

## 2024-11-19 DIAGNOSIS — N20.0 RENAL CALCULUS: Primary | ICD-10-CM

## 2024-11-19 LAB
CALCIUM 24H UR-MCNC: 142.1 MG/24 HRS (ref 100–300)
PERIOD: 24 HOURS
PH UR STRIP.AUTO: 5.5 [PH]
SODIUM 24H UR-SRATE: 147.9 MMOL/24 HRS (ref 40–220)
SPECIMEN VOL UR: 1450 ML
URATE 24H UR-MCNC: 653.95 MG/24 HRS (ref 250–800)

## 2024-11-19 PROCEDURE — 83986 ASSAY PH BODY FLUID NOS: CPT

## 2024-11-19 PROCEDURE — 83945 ASSAY OF OXALATE: CPT

## 2024-11-19 PROCEDURE — 82507 ASSAY OF CITRATE: CPT

## 2024-11-19 PROCEDURE — 82340 ASSAY OF CALCIUM IN URINE: CPT

## 2024-11-19 PROCEDURE — 84560 ASSAY OF URINE/URIC ACID: CPT

## 2024-11-19 PROCEDURE — 84300 ASSAY OF URINE SODIUM: CPT

## 2024-11-20 ENCOUNTER — HOSPITAL ENCOUNTER (OUTPATIENT)
Dept: RADIOLOGY | Facility: HOSPITAL | Age: 69
Discharge: HOME | End: 2024-11-20
Attending: SPECIALIST
Payer: COMMERCIAL

## 2024-11-20 DIAGNOSIS — N20.0 CALCULUS OF KIDNEY: ICD-10-CM

## 2024-11-20 PROCEDURE — 76775 US EXAM ABDO BACK WALL LIM: CPT

## 2024-11-22 LAB
CITRATE 24H UR-MCNC: 429 MG/L
CITRATE 24H UR-MRATE: 622 MG/24 HR (ref 320–1240)
OXALATE 24H UR-MRATE: 52 MG/24 HR (ref 7–44)
OXALATE UR-MCNC: 36 MG/L

## 2024-11-26 DIAGNOSIS — I25.10 CORONARY ARTERY DISEASE INVOLVING NATIVE CORONARY ARTERY OF NATIVE HEART WITHOUT ANGINA PECTORIS: ICD-10-CM

## 2024-11-27 RX ORDER — LISINOPRIL 5 MG/1
5 TABLET ORAL DAILY
Qty: 90 TABLET | Refills: 0 | Status: SHIPPED | OUTPATIENT
Start: 2024-11-27

## 2024-12-09 NOTE — PROGRESS NOTES
Progress Note - Cardiology Office  [facility]    Jordin Figueroa 69 y.o. male MRN: [id number]  : 1955  Encounter: [id number]        Assessment & Plan  Coronary artery disease involving native coronary artery, unspecified whether angina present, unspecified whether native or transplanted heart    Myocardial infarction, unspecified MI type, unspecified artery (HCC)    History of PTCA with stent    History of coronary artery bypass graft (CABGx4 in )  CAD, s/p MI in , s/p PCI mLAD on 1999  S/p CABG 2009 x 3/4  On aspirin, Crestor, Toprol-XL, and lisinopril  Other hyperlipidemia  2022: LDL 45, , HDL 58, normal AST, ALT  On Crestor 40 mg daily and Zetia 10 mg daily, and fish oil once a week  2024: LDL 56, , HDL 46, normal AST and ALT  2024: LDL 48, , HDL 48, normal AST and ALT  Primary hypertension  BP today is 146/88 with heart rate of 74/min  > Increase lisinopril to 7.5 mg daily    Coronary artery disease involving native coronary artery of native heart without angina pectoris    History of elevated hemoglobin/hematocrit     Cardiac tests outside [facility]     Nuclear stress test, 2019  Normal, EF 67%     Nuclear stress test, 2023:  Normal stress test     EKG, 2022:  Sinus rhythm, ?RBBB     Cardiac catheterization, 2019:  80% p and mRCA, 80% ostial LAD and 80% mLAD, 95% m CX, 60% OM1  > CABG    Kidney Stones  S/p cystoscopy, ureteroscopy, with lithotripsy holmium laser, basket extraction and insertion of ureteral stent    RECOMMENDATIONS:  Increase lisinopril to 7.5 mg daily.  Continue aspirin 81 mg, Zetia 10 mg, Crestor 40 mg, fish oil, Toprol 50 mg,   Low-salt and low-cholesterol diet  Regular cardiovascular exercise as tolerated   Echocardiogram  Monitor BP at home and call us if systolic BP is consistently more than 130.  If systolic BP is less than 100, go back to 5 mg of lisinopril          Please call  [phone] if any questions.    HPI :     Jordin Figueroa is a 69 y.o. year old male who came for follow up.  He has no cardiac symptoms.  However in September he had developed kidney stone and underwent lithotripsy, extraction and insertion of ureteral stent.  His blood pressure is mildly elevated for which I am going to increase his lisinopril.    REVIEW OF SYSTEMS:  Denies any new or acute cardiac symptoms.  Denies chest pain, dyspnea, palpitations or syncope      Historical Information   Past Medical History:   Diagnosis Date    ANIVAL (acute kidney injury) ([facility]) 09/02/2024    Coronary artery disease     HTN (hypertension)     Hyperlipidemia     Hypertension     Migraine     Myocardial infarction (HCC) 1999    one stent     Past Surgical History:   Procedure Laterality Date    ANGIOPLASTY  1999    one stent    COLONOSCOPY  2015    CORONARY ARTERY BYPASS GRAFT  2009    x4    FL RETROGRADE PYELOGRAM  9/5/2024    LASIK      SD CYSTO/URETERO W/LITHOTRIPSY &INDWELL STENT INSRT Left 9/5/2024    Procedure: CYSTOSCOPY URETEROSCOPY, STONE MANIPULATION, RETROGRADE PYELOGRAM AND INSERTION STENT URETERAL;  Surgeon: Jersey Gunderson MD;  Location: [facility] OR;  Service: Urology    SD CYSTO/URETERO W/LITHOTRIPSY &INDWELL STENT INSRT Left 9/19/2024    Procedure: CYSTOSCOPY URETEROSCOPY WITH LITHOTRIPSY HOLMIUM LASER,  BASKET EXTRACTION, AND INSERTION STENT URETERAL;  Surgeon: Jersey Gunderson MD;  Location: [facility] OR;  Service: Urology     Social History     Substance and Sexual Activity   Alcohol Use Yes    Comment: seldom     Social History     Substance and Sexual Activity   Drug Use Never     Social History     Tobacco Use   Smoking Status Never    Passive exposure: Past   Smokeless Tobacco Never     Family History:   Family History   Problem Relation Age of Onset    Alzheimer's disease Mother     Other Mother         alzheimers    Cancer Father         rectal    Stroke Father     Rectal cancer Father        Meds/Allergies  "    No Known Allergies    Current Outpatient Medications:     amoxicillin-clavulanate (AUGMENTIN) 500-125 mg per tablet, Take 1 tablet by mouth 2 (two) times a day, Disp: , Rfl:     Ascorbic Acid (Vitamin C) 500 MG CAPS, once a week On Saturday, Disp: , Rfl:     aspirin (ECOTRIN LOW STRENGTH) 81 mg EC tablet, Take 81 mg by mouth every morning, Disp: , Rfl:     Echinacea 500 MG CAPS, once a week Saturday, Disp: , Rfl:     ezetimibe (ZETIA) 10 mg tablet, Take 1 tablet (10 mg total) by mouth daily, Disp: 90 tablet, Rfl: 1    Fluticasone Propionate (Xhance) 93 MCG/ACT EXHU, 1 spray into each nostril 2 (two) times a day, Disp: 16 mL, Rfl: 6    Ginkgo Biloba 120 MG CAPS, once a week Saturday, Disp: , Rfl:     lisinopril (ZESTRIL) 5 mg tablet, Take 1.5 tablets (7.5 mg total) by mouth daily, Disp: 135 tablet, Rfl: 2    metoprolol succinate (TOPROL-XL) 50 mg 24 hr tablet, Take 1 tablet (50 mg total) by mouth daily, Disp: 90 tablet, Rfl: 1    Multiple Vitamins-Minerals (CENTRUM SILVER 50+MEN PO), every morning, Disp: , Rfl:     Omega-3 Fatty Acids (OMEGA 3 PO), once a week On Saturday, Disp: , Rfl:     rosuvastatin (CRESTOR) 40 MG tablet, Take 1 tablet (40 mg total) by mouth every morning, Disp: 90 tablet, Rfl: 1    solifenacin (VESICARE) 5 mg tablet, take 1 tablet by mouth once daily, Disp: 90 tablet, Rfl: 1    tamsulosin (FLOMAX) 0.4 mg, take 1 capsule by mouth once daily  WITH DINNER, Disp: 90 capsule, Rfl: 1    vitamin B-12 (VITAMIN B-12) 1,000 mcg tablet, once a week On Saturday, Disp: , Rfl:     Vitals: Blood pressure 146/88, pulse 74, height 5' 9\" (1.753 m), weight 88.5 kg (195 lb), SpO2 96%.    Body mass index is 28.8 kg/m².  Vitals:    12/10/24 0951   Weight: 88.5 kg (195 lb)     BP Readings from Last 3 Encounters:   12/10/24 146/88   09/19/24 150/80   09/11/24 132/70       Physical Exam:  Physical Exam    Neurologic:  Alert & oriented x 3, no new focal deficits, Not in any acute distress,  Constitutional: " "Overweight  Eyes:  Pupil equal and reacting to light, conjunctiva normal,   HENT:  Atraumatic, oropharynx moist, Neck- normal range of motion, no tenderness,  Neck supple, No JVP, No LNP   Respiratory:  Bilateral air entry, mostly clear to auscultation  Cardiovascular: S1-S2 regular with a I/VI systolic murmur   GI:  Soft, nondistended, normal bowel sounds, nontender, no hepatosplenomegaly appreciated.  Musculoskeletal:  No tenderness, no deformities.   Skin:  Well hydrated, no rash   Lymphatic:  No lymphadenopathy noted   Extremities:  No edema and distal pulses are present        Diagnostic Studies Review Cardio:      EKG: Normal sinus rhythm, heart rate 74/min, IRBBB    Cardiac testing:   No results found for this or any previous visit.        Imaging:  Chest X-Ray:   No Chest XR results available for this patient.    CT-scan of the chest:     No CTA results available for this patient.  Lab Review   Lab Results   Component Value Date    WBC 10.23 (H) 09/06/2024    HGB 15.2 09/06/2024    HCT 42.6 09/06/2024    MCV 83 09/06/2024    RDW 13.1 09/06/2024     09/06/2024     BMP:  Lab Results   Component Value Date    SODIUM 137 09/06/2024    K 3.9 09/06/2024     09/06/2024    CO2 24 09/06/2024    BUN 11 09/06/2024    CREATININE 0.75 09/06/2024    GLUC 134 09/06/2024    GLUF 108 (H) 09/05/2024    CALCIUM 9.1 09/06/2024    EGFR 92 09/06/2024    MG 2.0 09/06/2024     LFT:  Lab Results   Component Value Date    AST 21 09/05/2024    ALT 22 09/05/2024    ALKPHOS 43 09/05/2024    TP 6.2 (L) 09/05/2024    ALB 4.0 09/05/2024      No components found for: \"TSH3\"  No results found for: \"[id number]\"  Lab Results   Component Value Date    HGBA1C 5.4 08/01/2024     Lipid Profile:   Lab Results   Component Value Date    CHOLESTEROL 124 08/01/2024    HDL 48 08/01/2024    LDLCALC 48 08/01/2024    TRIG 142 08/01/2024     Lab Results   Component Value Date    CHOLESTEROL 124 08/01/2024    CHOLESTEROL 119 02/26/2024     No " "results found for: \"CKTOTAL\", \"CKMB\", \"CKMBINDEX\", \"TROPONINI\"  No results found for: \"NTBNP\"   Recent Results (from the past 4 weeks)   Oxalate, urine, 24 hour    Collection Time: 11/19/24  2:44 PM   Result Value Ref Range    Oxalate, Ur 36 Undefined mg/L    Oxalate, 24H Ur 52 (H) 7 - 44 mg/24 hr   Calcium, urine, 24 hour    Collection Time: 11/19/24  2:44 PM   Result Value Ref Range    24H Urine Volume 1,450 mL    Calcium, 24H Urine 142.1 100 - 300 mg/24 hrs   Uric acid, urine, 24 hour    Collection Time: 11/19/24  2:44 PM   Result Value Ref Range    24H Urine Volume 1,450 mL    Uric Acid, 24H Ur 653.95 250 - 800 mg/24 hrs    PERIOD 24 Hours   Citrate, urine, 24 hour    Collection Time: 11/19/24  2:44 PM   Result Value Ref Range    Citric Acid, U, 24hr 622 320 - 1240 mg/24 hr    CITRIC ACID, URINE 429 Undefined mg/L   Sodium, urine, 24 hour    Collection Time: 11/19/24  2:44 PM   Result Value Ref Range    Sodium, 24H Ur 147.9 40 - 220 mmol/24 hrs    24H Urine Volume 1,450 mL    PERIOD 24 Hours   pH, 24 HR Urine    Collection Time: 11/19/24  2:44 PM   Result Value Ref Range    pH, UA 5.5 4.5, 5.0, 5.5, 6.0, 6.5, 7.0, 7.5, 8.0    24H Urine Volume 1,450 mL    PERIOD 24 Hours             Dr. John Jones MD, [facility]      \"This note has been constructed using a voice recognition system.Therefore there may be syntax, spelling, and/or grammatical errors. Please call if you have any questions. \"  "

## 2024-12-09 NOTE — ASSESSMENT & PLAN NOTE
11/12/2022: LDL 45, , HDL 58, normal AST, ALT  On Crestor 40 mg daily and Zetia 10 mg daily, and fish oil once a week  02/26/2024: LDL 56, , HDL 46, normal AST and ALT  08/01/2024: LDL 48, , HDL 48, normal AST and ALT

## 2024-12-10 ENCOUNTER — OFFICE VISIT (OUTPATIENT)
Dept: CARDIOLOGY CLINIC | Facility: CLINIC | Age: 69
End: 2024-12-10
Payer: COMMERCIAL

## 2024-12-10 VITALS
HEIGHT: 69 IN | OXYGEN SATURATION: 96 % | HEART RATE: 74 BPM | DIASTOLIC BLOOD PRESSURE: 88 MMHG | BODY MASS INDEX: 28.88 KG/M2 | SYSTOLIC BLOOD PRESSURE: 146 MMHG | WEIGHT: 195 LBS

## 2024-12-10 DIAGNOSIS — I25.10 CORONARY ARTERY DISEASE INVOLVING NATIVE CORONARY ARTERY, UNSPECIFIED WHETHER ANGINA PRESENT, UNSPECIFIED WHETHER NATIVE OR TRANSPLANTED HEART: ICD-10-CM

## 2024-12-10 DIAGNOSIS — I10 PRIMARY HYPERTENSION: ICD-10-CM

## 2024-12-10 DIAGNOSIS — Z95.1 HISTORY OF CORONARY ARTERY BYPASS GRAFT: ICD-10-CM

## 2024-12-10 DIAGNOSIS — I21.9 MYOCARDIAL INFARCTION, UNSPECIFIED MI TYPE, UNSPECIFIED ARTERY (HCC): Primary | ICD-10-CM

## 2024-12-10 DIAGNOSIS — Z98.61 HISTORY OF PTCA: ICD-10-CM

## 2024-12-10 DIAGNOSIS — I25.10 CORONARY ARTERY DISEASE INVOLVING NATIVE CORONARY ARTERY OF NATIVE HEART WITHOUT ANGINA PECTORIS: ICD-10-CM

## 2024-12-10 DIAGNOSIS — E78.49 OTHER HYPERLIPIDEMIA: ICD-10-CM

## 2024-12-10 DIAGNOSIS — Z95.5 H/O HEART ARTERY STENT: ICD-10-CM

## 2024-12-10 PROCEDURE — 93000 ELECTROCARDIOGRAM COMPLETE: CPT | Performed by: INTERNAL MEDICINE

## 2024-12-10 PROCEDURE — 99214 OFFICE O/P EST MOD 30 MIN: CPT | Performed by: INTERNAL MEDICINE

## 2024-12-10 RX ORDER — LISINOPRIL 5 MG/1
7.5 TABLET ORAL DAILY
Qty: 135 TABLET | Refills: 2 | Status: SHIPPED | OUTPATIENT
Start: 2024-12-10

## 2024-12-31 ENCOUNTER — TELEPHONE (OUTPATIENT)
Age: 69
End: 2024-12-31

## 2024-12-31 NOTE — TELEPHONE ENCOUNTER
Patient calls in today, asking about isolation and re testing.  Patient given updated COVID education.  No further intervention needed.

## 2025-01-13 ENCOUNTER — RESULTS FOLLOW-UP (OUTPATIENT)
Dept: CARDIOLOGY CLINIC | Facility: CLINIC | Age: 70
End: 2025-01-13

## 2025-01-13 ENCOUNTER — HOSPITAL ENCOUNTER (OUTPATIENT)
Dept: NON INVASIVE DIAGNOSTICS | Facility: HOSPITAL | Age: 70
Discharge: HOME | End: 2025-01-13
Attending: INTERNAL MEDICINE
Payer: COMMERCIAL

## 2025-01-13 VITALS
HEIGHT: 69 IN | SYSTOLIC BLOOD PRESSURE: 153 MMHG | WEIGHT: 195 LBS | DIASTOLIC BLOOD PRESSURE: 52 MMHG | HEART RATE: 76 BPM | BODY MASS INDEX: 28.88 KG/M2

## 2025-01-13 DIAGNOSIS — I21.9 MYOCARDIAL INFARCTION, UNSPECIFIED MI TYPE, UNSPECIFIED ARTERY (HCC): ICD-10-CM

## 2025-01-13 DIAGNOSIS — Z98.61 HISTORY OF PTCA: ICD-10-CM

## 2025-01-13 DIAGNOSIS — I25.10 CORONARY ARTERY DISEASE INVOLVING NATIVE CORONARY ARTERY, UNSPECIFIED WHETHER ANGINA PRESENT, UNSPECIFIED WHETHER NATIVE OR TRANSPLANTED HEART: ICD-10-CM

## 2025-01-13 LAB
AORTIC ROOT: 3.6 CM
AV LVOT PEAK GRADIENT: 2 MMHG
AV PEAK GRADIENT: 3 MMHG
BSA FOR ECHO PROCEDURE: 2.04 M2
DOP CALC LVOT AREA: 3.46 CM2
DOP CALC LVOT DIAMETER: 2.1 CM
E WAVE DECELERATION TIME: 297 MS
E/A RATIO: 0.97
FRACTIONAL SHORTENING: 30 (ref 28–44)
INTERVENTRICULAR SEPTUM IN DIASTOLE (PARASTERNAL SHORT AXIS VIEW): 1 CM
INTERVENTRICULAR SEPTUM: 1 CM (ref 0.6–1.1)
LAAS-AP2: 18.4 CM2
LAAS-AP4: 15.6 CM2
LEFT ATRIUM SIZE: 3.9 CM
LEFT ATRIUM VOLUME (MOD BIPLANE): 45 ML
LEFT ATRIUM VOLUME INDEX (MOD BIPLANE): 22.1 ML/M2
LEFT INTERNAL DIMENSION IN SYSTOLE: 3.3 CM (ref 2.1–4)
LEFT VENTRICULAR INTERNAL DIMENSION IN DIASTOLE: 4.7 CM (ref 3.5–6)
LEFT VENTRICULAR POSTERIOR WALL IN END DIASTOLE: 0.9 CM
LEFT VENTRICULAR STROKE VOLUME: 59 ML
LV EF US.2D.A4C+ESTIMATED: 66 %
LVSV (TEICH): 59 ML
MV E'TISSUE VEL-LAT: 8 CM/S
MV E'TISSUE VEL-SEP: 10 CM/S
MV PEAK A VEL: 0.62 M/S
MV PEAK E VEL: 60 CM/S
MV STENOSIS PRESSURE HALF TIME: 86 MS
MV VALVE AREA P 1/2 METHOD: 2.56
RIGHT ATRIUM AREA SYSTOLE A4C: 14.8 CM2
RIGHT VENTRICLE ID DIMENSION: 3 CM
SL CV LEFT ATRIUM LENGTH A2C: 5.3 CM
SL CV LV EF: 60
SL CV PED ECHO LEFT VENTRICLE DIASTOLIC VOLUME (MOD BIPLANE) 2D: 104 ML
SL CV PED ECHO LEFT VENTRICLE SYSTOLIC VOLUME (MOD BIPLANE) 2D: 46 ML
TR MAX PG: 22 MMHG
TR PEAK VELOCITY: 2.4 M/S
TRICUSPID ANNULAR PLANE SYSTOLIC EXCURSION: 1.6 CM
TRICUSPID VALVE PEAK REGURGITATION VELOCITY: 2.37 M/S

## 2025-01-13 PROCEDURE — 93306 TTE W/DOPPLER COMPLETE: CPT | Performed by: INTERNAL MEDICINE

## 2025-01-13 PROCEDURE — 93306 TTE W/DOPPLER COMPLETE: CPT

## 2025-02-04 ENCOUNTER — APPOINTMENT (OUTPATIENT)
Dept: LAB | Facility: CLINIC | Age: 70
End: 2025-02-04
Payer: COMMERCIAL

## 2025-02-04 DIAGNOSIS — N20.1 URETEROLITHIASIS: ICD-10-CM

## 2025-02-04 DIAGNOSIS — I10 PRIMARY HYPERTENSION: ICD-10-CM

## 2025-02-04 DIAGNOSIS — Z13.1 SCREENING FOR DIABETES MELLITUS (DM): ICD-10-CM

## 2025-02-04 LAB
ALBUMIN SERPL BCG-MCNC: 4.6 G/DL (ref 3.5–5)
ALP SERPL-CCNC: 47 U/L (ref 34–104)
ALT SERPL W P-5'-P-CCNC: 29 U/L (ref 7–52)
ANION GAP SERPL CALCULATED.3IONS-SCNC: 3 MMOL/L (ref 4–13)
AST SERPL W P-5'-P-CCNC: 24 U/L (ref 13–39)
BILIRUB SERPL-MCNC: 1.21 MG/DL (ref 0.2–1)
BUN SERPL-MCNC: 18 MG/DL (ref 5–25)
CALCIUM SERPL-MCNC: 9.4 MG/DL (ref 8.4–10.2)
CHLORIDE SERPL-SCNC: 103 MMOL/L (ref 96–108)
CO2 SERPL-SCNC: 33 MMOL/L (ref 21–32)
CREAT SERPL-MCNC: 0.98 MG/DL (ref 0.6–1.3)
EST. AVERAGE GLUCOSE BLD GHB EST-MCNC: 105 MG/DL
GFR SERPL CREATININE-BSD FRML MDRD: 77 ML/MIN/1.73SQ M
GLUCOSE P FAST SERPL-MCNC: 107 MG/DL (ref 65–99)
HBA1C MFR BLD: 5.3 %
POTASSIUM SERPL-SCNC: 4.6 MMOL/L (ref 3.5–5.3)
PROT SERPL-MCNC: 6.9 G/DL (ref 6.4–8.4)
SODIUM SERPL-SCNC: 139 MMOL/L (ref 135–147)

## 2025-02-04 PROCEDURE — 83036 HEMOGLOBIN GLYCOSYLATED A1C: CPT

## 2025-02-04 PROCEDURE — 80053 COMPREHEN METABOLIC PANEL: CPT

## 2025-02-04 PROCEDURE — 36415 COLL VENOUS BLD VENIPUNCTURE: CPT

## 2025-02-06 ENCOUNTER — RESULTS FOLLOW-UP (OUTPATIENT)
Dept: FAMILY MEDICINE CLINIC | Facility: CLINIC | Age: 70
End: 2025-02-06

## 2025-02-17 ENCOUNTER — RA CDI HCC (OUTPATIENT)
Dept: OTHER | Facility: HOSPITAL | Age: 70
End: 2025-02-17

## 2025-02-24 ENCOUNTER — OFFICE VISIT (OUTPATIENT)
Dept: FAMILY MEDICINE CLINIC | Facility: CLINIC | Age: 70
End: 2025-02-24
Payer: COMMERCIAL

## 2025-02-24 VITALS
WEIGHT: 193.2 LBS | DIASTOLIC BLOOD PRESSURE: 72 MMHG | TEMPERATURE: 97.7 F | SYSTOLIC BLOOD PRESSURE: 130 MMHG | HEIGHT: 69 IN | HEART RATE: 92 BPM | BODY MASS INDEX: 28.61 KG/M2 | RESPIRATION RATE: 16 BRPM

## 2025-02-24 DIAGNOSIS — E78.49 OTHER HYPERLIPIDEMIA: ICD-10-CM

## 2025-02-24 DIAGNOSIS — R73.03 PREDIABETES: ICD-10-CM

## 2025-02-24 DIAGNOSIS — I25.10 CORONARY ARTERY DISEASE INVOLVING NATIVE CORONARY ARTERY OF NATIVE HEART WITHOUT ANGINA PECTORIS: ICD-10-CM

## 2025-02-24 DIAGNOSIS — Z00.00 MEDICARE ANNUAL WELLNESS VISIT, SUBSEQUENT: Primary | ICD-10-CM

## 2025-02-24 DIAGNOSIS — L30.8 OTHER ECZEMA: ICD-10-CM

## 2025-02-24 DIAGNOSIS — N20.1 LEFT URETERAL CALCULUS: ICD-10-CM

## 2025-02-24 DIAGNOSIS — Z12.5 ENCOUNTER FOR SCREENING FOR MALIGNANT NEOPLASM OF PROSTATE: ICD-10-CM

## 2025-02-24 DIAGNOSIS — I10 PRIMARY HYPERTENSION: ICD-10-CM

## 2025-02-24 PROCEDURE — G2211 COMPLEX E/M VISIT ADD ON: HCPCS | Performed by: FAMILY MEDICINE

## 2025-02-24 PROCEDURE — 99214 OFFICE O/P EST MOD 30 MIN: CPT | Performed by: FAMILY MEDICINE

## 2025-02-24 PROCEDURE — G0439 PPPS, SUBSEQ VISIT: HCPCS | Performed by: FAMILY MEDICINE

## 2025-02-24 RX ORDER — ROSUVASTATIN CALCIUM 40 MG/1
40 TABLET, COATED ORAL EVERY MORNING
Qty: 90 TABLET | Refills: 1 | Status: SHIPPED | OUTPATIENT
Start: 2025-02-24

## 2025-02-24 RX ORDER — TRIAMCINOLONE ACETONIDE 1 MG/G
CREAM TOPICAL 2 TIMES DAILY PRN
Qty: 240 G | Refills: 1 | Status: SHIPPED | OUTPATIENT
Start: 2025-02-24

## 2025-02-24 RX ORDER — EZETIMIBE 10 MG/1
10 TABLET ORAL DAILY
Qty: 90 TABLET | Refills: 1 | Status: SHIPPED | OUTPATIENT
Start: 2025-02-24

## 2025-02-24 RX ORDER — TAMSULOSIN HYDROCHLORIDE 0.4 MG/1
0.4 CAPSULE ORAL
Qty: 100 CAPSULE | Refills: 1 | Status: SHIPPED | OUTPATIENT
Start: 2025-02-24

## 2025-02-24 RX ORDER — METOPROLOL SUCCINATE 50 MG/1
50 TABLET, EXTENDED RELEASE ORAL DAILY
Qty: 90 TABLET | Refills: 1 | Status: SHIPPED | OUTPATIENT
Start: 2025-02-24

## 2025-02-24 NOTE — PROGRESS NOTES
Name: Jordin Figueroa      : 1955      MRN: [id number]  Encounter Provider: Samir Jacome DO  Encounter Date: 2025   Encounter department: [facility]  :  Assessment & Plan  Encounter for screening for malignant neoplasm of prostate  Yearly if not done at urologist  Orders:    PSA, Total Screen; Future    Other hyperlipidemia  Stable on meds  Orders:    Lipid Panel with Direct LDL reflex; Future    ezetimibe (ZETIA) 10 mg tablet; Take 1 tablet (10 mg total) by mouth daily    rosuvastatin (CRESTOR) 40 MG tablet; Take 1 tablet (40 mg total) by mouth every morning    Primary hypertension  Stable on meds  Orders:    Comprehensive metabolic panel; Future    metoprolol succinate (TOPROL-XL) 50 mg 24 hr tablet; Take 1 tablet (50 mg total) by mouth daily    Left ureteral calculus  Stay hydrated, f/u urology  Orders:    tamsulosin (FLOMAX) 0.4 mg; Take 1 capsule (0.4 mg total) by mouth daily with dinner    Other eczema  Legs, intermittent steroids suggested  Orders:    triamcinolone (KENALOG) 0.1 % cream; Apply topically 2 (two) times a day as needed for rash Sparingly (short term) to affected area: legs    Prediabetes  Watch carbs  Orders:    Hemoglobin A1C; Future    Coronary artery disease involving native coronary artery of native heart without angina pectoris  Stable  F/u cardiology       Medicare annual wellness visit, subsequent  updated              History of Present Illness   HPI  Tolerating zetia and crestor  No myalgias  No palpitations or dizziness on toprol  Sees urology and cardiology  Review of Systems   Constitutional:  Negative for fever.   Respiratory:  Negative for shortness of breath.    Cardiovascular:  Negative for chest pain.     Family History   Problem Relation Age of Onset    Alzheimer's disease Mother     Other Mother         alzheimers    Cancer Father         rectal    Stroke Father     Rectal cancer Father       Social History     Tobacco Use    Smoking status:  "Never     Passive exposure: Past    Smokeless tobacco: Never   Vaping Use    Vaping status: Never Used   Substance Use Topics    Alcohol use: Yes     Comment: seldom    Drug use: Never      Current Outpatient Medications   Medication Instructions    Ascorbic Acid (Vitamin C) 500 MG CAPS Weekly    aspirin (ECOTRIN LOW STRENGTH) 81 mg, Every morning    Echinacea 500 MG CAPS Weekly    ezetimibe (ZETIA) 10 mg, Oral, Daily    Fluticasone Propionate (Xhance) 93 MCG/ACT EXHU 1 spray, Nasal, 2 times daily    Ginkgo Biloba 120 MG CAPS Weekly    lisinopril (ZESTRIL) 7.5 mg, Oral, Daily    metoprolol succinate (TOPROL-XL) 50 mg, Oral, Daily    Multiple Vitamins-Minerals (CENTRUM SILVER 50+MEN PO) Every morning    Omega-3 Fatty Acids (OMEGA 3 PO) Weekly    rosuvastatin (CRESTOR) 40 mg, Oral, Every morning    solifenacin (VESICARE) 5 mg, Oral, Daily    tamsulosin (FLOMAX) 0.4 mg, Oral, Daily with dinner    triamcinolone (KENALOG) 0.1 % cream Topical, 2 times daily PRN, Sparingly (short term) to affected area: legs    vitamin B-12 (VITAMIN B-12) 1,000 mcg tablet Weekly     >>>>>>>>  Return in about 5 months (around 8/7/2025) for Recheck.  >>>>>>>>    [POCT]  No results found for this or any previous visit (from the past 24 hours).    Visit Vitals  /72   Pulse 92   Temp 97.7 °F (36.5 °C)   Resp 16   Ht 5' 9\" (1.753 m)   Wt 87.6 kg (193 lb 3.2 oz)   BMI 28.53 kg/m²   Smoking Status Never   BSA 2.04 m²        Objective   /72   Pulse 92   Temp 97.7 °F (36.5 °C)   Resp 16   Ht 5' 9\" (1.753 m)   Wt 87.6 kg (193 lb 3.2 oz)   BMI 28.53 kg/m²      Physical Exam  Vitals and nursing note reviewed.   Constitutional:       General: He is not in acute distress.     Appearance: He is well-developed. He is not ill-appearing.   HENT:      Head: Normocephalic.      Right Ear: Tympanic membrane normal.      Left Ear: Tympanic membrane normal.      Mouth/Throat:      Mouth: Mucous membranes are moist.   Eyes:      " Conjunctiva/sclera: Conjunctivae normal.   Neck:      Vascular: No carotid bruit.   Cardiovascular:      Rate and Rhythm: Normal rate and regular rhythm.   Pulmonary:      Effort: Pulmonary effort is normal. No respiratory distress.      Breath sounds: No rales.   Abdominal:      General: There is no distension.      Palpations: Abdomen is soft.      Tenderness: There is no abdominal tenderness.   Musculoskeletal:         General: No deformity.      Cervical back: Neck supple.      Right lower leg: No edema.      Left lower leg: No edema.   Skin:     General: Skin is warm and dry.      Coloration: Skin is not jaundiced or pale.   Neurological:      Mental Status: He is alert.      Motor: No weakness.      Gait: Gait normal.   Psychiatric:         Mood and Affect: Mood normal.         Behavior: Behavior normal.         Thought Content: Thought content normal.

## 2025-02-25 PROBLEM — R73.03 PREDIABETES: Status: ACTIVE | Noted: 2025-02-25

## 2025-02-25 PROBLEM — L30.9 ECZEMA: Status: ACTIVE | Noted: 2025-02-25

## 2025-02-25 PROBLEM — Z00.00 MEDICARE ANNUAL WELLNESS VISIT, SUBSEQUENT: Status: ACTIVE | Noted: 2025-02-25

## 2025-02-25 NOTE — ASSESSMENT & PLAN NOTE
Stable on meds  Orders:    Comprehensive metabolic panel; Future    metoprolol succinate (TOPROL-XL) 50 mg 24 hr tablet; Take 1 tablet (50 mg total) by mouth daily

## 2025-02-25 NOTE — ASSESSMENT & PLAN NOTE
Stable on meds  Orders:    Lipid Panel with Direct LDL reflex; Future    ezetimibe (ZETIA) 10 mg tablet; Take 1 tablet (10 mg total) by mouth daily    rosuvastatin (CRESTOR) 40 MG tablet; Take 1 tablet (40 mg total) by mouth every morning

## 2025-02-25 NOTE — ASSESSMENT & PLAN NOTE
Legs, intermittent steroids suggested  Orders:    triamcinolone (KENALOG) 0.1 % cream; Apply topically 2 (two) times a day as needed for rash Sparingly (short term) to affected area: legs

## 2025-02-25 NOTE — ASSESSMENT & PLAN NOTE
Stay hydrated, f/u urology  Orders:    tamsulosin (FLOMAX) 0.4 mg; Take 1 capsule (0.4 mg total) by mouth daily with dinner

## 2025-03-27 PROBLEM — Z00.00 MEDICARE ANNUAL WELLNESS VISIT, SUBSEQUENT: Status: RESOLVED | Noted: 2025-02-25 | Resolved: 2025-03-27

## 2025-05-09 ENCOUNTER — TELEPHONE (OUTPATIENT)
Age: 70
End: 2025-05-09

## 2025-05-09 NOTE — TELEPHONE ENCOUNTER
Received call from Patient for Follow Up - Skin Check - Yearly Check Up. Scheduled 6/9/25 10:50 am Maxwell Valencia. Verified insurance, provided [place] addr.    Patient verbalized understanding.

## 2025-05-13 DIAGNOSIS — N20.1 LEFT URETERAL CALCULUS: ICD-10-CM

## 2025-05-13 RX ORDER — SOLIFENACIN SUCCINATE 5 MG/1
5 TABLET, FILM COATED ORAL DAILY
Qty: 90 TABLET | Refills: 1 | Status: SHIPPED | OUTPATIENT
Start: 2025-05-13

## 2025-06-09 ENCOUNTER — OFFICE VISIT (OUTPATIENT)
Age: 70
End: 2025-06-09
Payer: COMMERCIAL

## 2025-06-09 VITALS — WEIGHT: 188.8 LBS | TEMPERATURE: 97.4 F | BODY MASS INDEX: 27.88 KG/M2

## 2025-06-09 DIAGNOSIS — D18.01 CHERRY ANGIOMA: ICD-10-CM

## 2025-06-09 DIAGNOSIS — L81.4 LENTIGINES: Primary | ICD-10-CM

## 2025-06-09 DIAGNOSIS — D22.9 MULTIPLE MELANOCYTIC NEVI: ICD-10-CM

## 2025-06-09 DIAGNOSIS — L57.0 KERATOSIS, ACTINIC: ICD-10-CM

## 2025-06-09 DIAGNOSIS — L82.1 SEBORRHEIC KERATOSIS: ICD-10-CM

## 2025-06-09 PROCEDURE — 17000 DESTRUCT PREMALG LESION: CPT | Performed by: DERMATOLOGY

## 2025-06-09 PROCEDURE — 99213 OFFICE O/P EST LOW 20 MIN: CPT | Performed by: DERMATOLOGY

## 2025-06-09 NOTE — PROGRESS NOTES
"[facility] Dermatology Clinic Note     Patient Name: Jordin Figueroa  Encounter Date: 6/9/2025       Have you been cared for by a [facility] Dermatologist in the last 3 years and, if so, which description applies to you? Yes. I have been here within the last 3 years, and my medical history has NOT changed since that time. I am not of child-bearing potential.     REVIEW OF SYSTEMS:  Have you recently had or currently have any of the following? No changes in my recent health.   PAST MEDICAL HISTORY:  Have you personally ever had or currently have any of the following?  If \"YES,\" then please provide more detail. No changes in my medical history.   HISTORY OF IMMUNOSUPPRESSION: Do you have a history of any of the following:  Systemic Immunosuppression such as Diabetes, Biologic or Immunotherapy, Chemotherapy, Organ Transplantation, Bone Marrow Transplantation or Prednisone?  No     Answering \"YES\" requires the addition of the dotphrase \"IMMUNOSUPPRESSED\" as the first diagnosis of the patient's visit.   FAMILY HISTORY:  Any \"first degree relatives\" (parent, brother, sister, or child) with the following?    No changes in my family's known health.   PATIENT EXPERIENCE:    Do you want the Dermatologist to perform a COMPLETE skin exam today including a clinical examination under the \"bra and underwear\" areas?  Yes  If necessary, do we have your permission to call and leave a detailed message on your Preferred Phone number that includes your specific medical information?  Yes      Allergies[1] Current Medications[2]              Whom besides the patient is providing clinical information about today's encounter?   NO ADDITIONAL HISTORIAN (patient alone provided history)    Physical Exam and Assessment/Plan by Diagnosis:      SEBORRHEIC KERATOSES- chest and back  - Relevant exam: Scattered over the trunk/extremities are waxy brown to black plaques and papules with stuck on appearance  - Exam and clinical history consistent " with seborrheic keratoses  - Counseled that these are benign growths that do not require treatment  - Counseled that removal of lesions is considered cosmetic and so would incur a fee should patient elect to move forward.   - Patient to hold on treatments for now but will inform us should they desire additional treatments    MELANOCYTIC NEVI  -Relevant exam: Scattered over the trunk/extremities are homogenously pigmented brown macules and papules. ELM performed and without concerning findings.  - Exam and clinical history consistent with melanocytic nevi  - Educated on the ABCDE's of melanoma; handout provided  - Counseled to return to clinic prior to scheduled appointment should any of these lesions change or should any new lesions of concern arise  - Counseled on use of sun protection daily. Reviewed latest FDA sunscreen guidelines, including use of broad spectrum (UVA and UVB blocking) sunscreen or sun protective clothing with SPF 30-50 every 2-3 hours and reapplied after exposure to water; use of photoprotective clothing, including a broad brim hat and UPF rated clothing if outdoors for several hours; avoid use of tanning beds as these pose significant risk for melanoma and skin cancer.    LENTIGINES- chest an back  OTHER SKIN CHANGES DUE TO CHRONIC EXPOSURE TO NONIONIZING RADIATION  - Relevant exam: Over sun exposed areas are brown macules. ELM performed and without concerning findings.  - Exam and clinical history consistent with lentigines.  - Educated that these are indicative of prior sun exposure.   - Counseled to return to clinic prior to scheduled appointment should any of these lesions change or should any new lesions of concern arise.  - Recommended use of sunscreen as above and below.  - Counseled on use of sun protection daily. Reviewed latest FDA sunscreen guidelines, including use of broad spectrum (UVA and UVB blocking) sunscreen or sun protective clothing with SPF 30-50 every 2-3 hours and  reapplied after exposure to water; use of photoprotective clothing, including a broad brim hat and UPF rated clothing if outdoors for several hours; avoid use of tanning beds as these pose significant risk for melanoma and skin cancer.    CHERRY ANGIOMAS  - Relevant exam: Scattered over the trunk/extremities are red papules  - Exam and clinical history consistent with cherry angiomas  - Educated that these are benign  - Educated that removal is considered aesthetic and would incur a fee.  - Patient does not wish to pursue removal at this time but will contact us should this change.    ACTINIC KERATOSES  - Relevant exam: On the left later cheek  are gritty pink papules  - Exam and clinical history consistent with actinic keratoses  - Discussed that these lesions are considered premalignant with the potential to evolve into squamous cell carcinoma.   - Discussed that these are due to chronic sun exposure and therefore recommend use of sunscreen/sun protection to prevent further sun damage  - Discussed treatment options, including liquid nitrogen destruction, topical immunotherapy, and photodynamic therapy, including risks, benefits      PROCEDURE:  DESTRUCTION OF PRE-MALIGNANT LESIONS    - After a thorough discussion of treatment options and risk/benefits/alternatives (including but not limited to local pain, scarring, dyspigmentation, blistering, and possible superinfection), verbal and written consent were obtained and the aforementioned lesions were treated on with cryotherapy using liquid nitrogen x 3 cycles for 5-10 seconds.    TOTAL NUMBER of 1 pre-malignant lesions were treated today on the ANATOMIC LOCATION: left lateral cheek.     The patient tolerated the procedure well, and after-care instructions were provided.    Scribe Attestation      I,:  Rebekah Ureña MA am acting as a scribe while in the presence of the attending physician.:       I,:  Liz Arreola MD personally performed the services  described in this documentation    as scribed in my presence.:                  [1] No Known Allergies  [2]   Current Outpatient Medications:     Ascorbic Acid (Vitamin C) 500 MG CAPS, once a week On Saturday, Disp: , Rfl:     aspirin (ECOTRIN LOW STRENGTH) 81 mg EC tablet, Take 81 mg by mouth every morning, Disp: , Rfl:     Echinacea 500 MG CAPS, once a week Saturday, Disp: , Rfl:     ezetimibe (ZETIA) 10 mg tablet, Take 1 tablet (10 mg total) by mouth daily, Disp: 90 tablet, Rfl: 1    Fluticasone Propionate (Xhance) 93 MCG/ACT EXHU, 1 spray into each nostril 2 (two) times a day, Disp: 16 mL, Rfl: 6    Ginkgo Biloba 120 MG CAPS, once a week Saturday, Disp: , Rfl:     lisinopril (ZESTRIL) 5 mg tablet, Take 1.5 tablets (7.5 mg total) by mouth daily, Disp: 135 tablet, Rfl: 2    metoprolol succinate (TOPROL-XL) 50 mg 24 hr tablet, Take 1 tablet (50 mg total) by mouth daily, Disp: 90 tablet, Rfl: 1    Multiple Vitamins-Minerals (CENTRUM SILVER 50+MEN PO), every morning, Disp: , Rfl:     Omega-3 Fatty Acids (OMEGA 3 PO), once a week On Saturday, Disp: , Rfl:     rosuvastatin (CRESTOR) 40 MG tablet, Take 1 tablet (40 mg total) by mouth every morning, Disp: 90 tablet, Rfl: 1    solifenacin (VESICARE) 5 mg tablet, take 1 tablet by mouth once daily, Disp: 90 tablet, Rfl: 1    tamsulosin (FLOMAX) 0.4 mg, Take 1 capsule (0.4 mg total) by mouth daily with dinner, Disp: 100 capsule, Rfl: 1    triamcinolone (KENALOG) 0.1 % cream, Apply topically 2 (two) times a day as needed for rash Sparingly (short term) to affected area: legs, Disp: 240 g, Rfl: 1    vitamin B-12 (VITAMIN B-12) 1,000 mcg tablet, once a week On Saturday, Disp: , Rfl:

## 2025-06-11 ENCOUNTER — TELEPHONE (OUTPATIENT)
Age: 70
End: 2025-06-11

## 2025-06-11 NOTE — TELEPHONE ENCOUNTER
Patient calling, asking if he has labs ordered to be completed before his upcoming appointment with Dr. Jones on 6/17/25. Advised patient Dr. Jones did not order labs at last OV, but could send message to see if he wants to order. Patient declined, he will discuss with Dr. Jones at appointment.

## 2025-06-16 ENCOUNTER — OFFICE VISIT (OUTPATIENT)
Age: 70
End: 2025-06-16
Payer: COMMERCIAL

## 2025-06-16 VITALS — BODY MASS INDEX: 28.58 KG/M2 | WEIGHT: 193 LBS | TEMPERATURE: 96.3 F | HEIGHT: 69 IN

## 2025-06-16 DIAGNOSIS — L91.8 SKIN TAG: Primary | ICD-10-CM

## 2025-06-16 PROCEDURE — 99212 OFFICE O/P EST SF 10 MIN: CPT | Performed by: DERMATOLOGY

## 2025-06-16 NOTE — PROGRESS NOTES
Progress Note - Cardiology Office  [facility]    Jordin Figueroa 69 y.o. male MRN: [id number]  : 1955  Encounter: [id number]      Assessment & Plan  H/O heart artery stent    Coronary artery disease involving native coronary artery, unspecified whether angina present, unspecified whether native or transplanted heart    History of coronary artery bypass graft (CABGx4 in )    Other hyperlipidemia    Primary hypertension       ASSESSMENT:   Coronary artery disease involving native coronary artery, unspecified whether angina present, unspecified whether native or transplanted heart     History of coronary artery bypass graft (CABGx4 in )  CAD, s/p MI in , s/p PCI mLAD on 1999  S/p CABG 2009 x 3/4  On aspirin, Crestor, Toprol-XL, and lisinopril    Other hyperlipidemia  2022: LDL 45, , HDL 58, normal AST, ALT  On Crestor 40 mg daily and Zetia 10 mg daily, and fish oil once a week  2024: LDL 56, , HDL 46, normal AST and ALT  2024: LDL 48, , HDL 48, normal AST and ALT    Primary hypertension  BP today is 124/70 with heart rate of 81/min  On lisinopril to 7.5 mg daily     Coronary artery disease involving native coronary artery of native heart without angina pectoris     History of elevated hemoglobin/hematocrit    TTE, 2025:  EF 60%, trace AR, mild MD, MR and TR     Cardiac tests outside [facility]     Nuclear stress test, 2019  Normal, EF 67%     Nuclear stress test, 2023:  Normal stress test     EKG, 2022:  Sinus rhythm, ?RBBB     Cardiac catheterization, 2019:  80% p and mRCA, 80% ostial LAD and 80% mLAD, 95% m CX, 60% OM1  > CABG     Kidney Stones  S/p cystoscopy, ureteroscopy, with lithotripsy holmium laser, basket extraction and insertion of ureteral stent     RECOMMENDATIONS:  Continue lisinopril 7.5 mg daily, aspirin 81 mg, Zetia 10 mg, Crestor 40 mg, fish oil, Toprol 50 mg,   Low-salt and low-cholesterol  "diet  Regular cardiovascular exercise as tolerated          Please call [phone] if any questions.    HPI :     Jordin Figueroa is a 69 y.o. year old male who came for follow up.  He generally feels well and denies any symptoms.  His blood pressure is very well-controlled on the change dose of medications.  His last lipid panel in August 2024 was also in normal range.  We also discussed the result of his echocardiogram from January which showed his EF was 60%    REVIEW OF SYSTEMS:  Denies any new or acute cardiac symptoms.  Denies chest pain, dyspnea, palpitations or syncope      Historical Information   Past Medical History[1]  Past Surgical History[2]  Social History     Substance and Sexual Activity   Alcohol Use Yes    Comment: seldom     Social History     Substance and Sexual Activity   Drug Use Never     Tobacco Use History[3]  Family History: Family History[4]    Meds/Allergies     Allergies[5]  Current Medications[6]    Vitals: Blood pressure 124/70, pulse 81, height 5' 9\" (1.753 m), weight 87.1 kg (192 lb), SpO2 97%.    Body mass index is 28.35 kg/m².  Vitals:    06/17/25 0811   Weight: 87.1 kg (192 lb)     BP Readings from Last 3 Encounters:   06/17/25 124/70   02/24/25 130/72   01/13/25 153/52       Physical Exam:  Physical Exam    Neurologic:  Alert & oriented x 3, no new focal deficits, Not in any acute distress,  Constitutional: Overweight  Eyes:  Pupil equal and reacting to light, conjunctiva normal,   HENT:  Atraumatic, oropharynx moist, Neck- normal range of motion, no tenderness,  Neck supple, No JVP, No LNP   Respiratory:  Bilateral air entry, mostly clear to auscultation  Cardiovascular: S1-S2 regular with a I/VI systolic murmur   GI:  Soft, nondistended, normal bowel sounds, nontender, no hepatosplenomegaly appreciated.  Musculoskeletal:  No tenderness, no deformities.   Skin:  Well hydrated, no rash   Lymphatic:  No lymphadenopathy noted   Extremities:  No edema         Diagnostic Studies " "Review Cardio:      EKG: Normal sinus rhythm, heart rate 81/min, old inferior infarct of undetermined age    Cardiac testing:       Results for orders placed during the hospital encounter of 01/13/25    Echo complete w/ contrast if indicated    Interpretation Summary    Left Ventricle: Left ventricular cavity size is normal. Wall thickness is mildly increased. There is borderline concentric hypertrophy. The left ventricular ejection fraction is 60% by visual estimation. Systolic function is normal. Wall motion is normal. Diastolic function is normal for age.    Aortic Valve: There is trace regurgitation. There is aortic valve sclerosis.    Mitral Valve: There is mild annular calcification. There is mild regurgitation.    Tricuspid Valve: There is at least mild regurgitation.  Calculated pulmonary artery pressure of 25 mmHg.    Pulmonic Valve: There is mild regurgitation.    Prior TTE study available for comparison. Prior study date: 4/1/2015.        Imaging:  Chest X-Ray:   No Chest XR results available for this patient.    CT-scan of the chest:     No CTA results available for this patient.  Lab Review   Lab Results   Component Value Date    WBC 10.23 (H) 09/06/2024    HGB 15.2 09/06/2024    HCT 42.6 09/06/2024    MCV 83 09/06/2024    RDW 13.1 09/06/2024     09/06/2024     BMP:  Lab Results   Component Value Date    SODIUM 139 02/04/2025    K 4.6 02/04/2025     02/04/2025    CO2 33 (H) 02/04/2025    BUN 18 02/04/2025    CREATININE 0.98 02/04/2025    GLUC 134 09/06/2024    GLUF 107 (H) 02/04/2025    CALCIUM 9.4 02/04/2025    EGFR 77 02/04/2025    MG 2.0 09/06/2024     LFT:  Lab Results   Component Value Date    AST 24 02/04/2025    ALT 29 02/04/2025    ALKPHOS 47 02/04/2025    TP 6.9 02/04/2025    ALB 4.6 02/04/2025      No components found for: \"TSH3\"  No results found for: \"KCH5QEJMEIYT\"  Lab Results   Component Value Date    HGBA1C 5.3 02/04/2025     Lipid Profile:   Lab Results   Component Value " "Date    CHOLESTEROL 124 08/01/2024    HDL 48 08/01/2024    LDLCALC 48 08/01/2024    TRIG 142 08/01/2024     Lab Results   Component Value Date    CHOLESTEROL 124 08/01/2024    CHOLESTEROL 119 02/26/2024     No results found for: \"CKTOTAL\", \"CKMB\", \"CKMBINDEX\", \"TROPONINI\"  No results found for: \"NTBNP\"   No results found for this or any previous visit (from the past 4 weeks).          Dr. John Jones MD, [facility]      \"This note has been constructed using a voice recognition system.Therefore there may be syntax, spelling, and/or grammatical errors. Please call if you have any questions. \"         [1]   Past Medical History:  Diagnosis Date    ANIVAL (acute kidney injury) ([facility]) 09/02/2024    Coronary artery disease     HTN (hypertension)     Hyperlipidemia     Hypertension     Migraine     Myocardial infarction (HCC) 1999    one stent   [2]   Past Surgical History:  Procedure Laterality Date    ANGIOPLASTY  1999    one stent    COLONOSCOPY  2015    CORONARY ARTERY BYPASS GRAFT  2009    x4    FL RETROGRADE PYELOGRAM  9/5/2024    LASIK      NE CYSTO/URETERO W/LITHOTRIPSY &INDWELL STENT INSRT Left 9/5/2024    Procedure: CYSTOSCOPY URETEROSCOPY, STONE MANIPULATION, RETROGRADE PYELOGRAM AND INSERTION STENT URETERAL;  Surgeon: Jersey Gunderson MD;  Location: [facility] OR;  Service: Urology    NE CYSTO/URETERO W/LITHOTRIPSY &INDWELL STENT INSRT Left 9/19/2024    Procedure: CYSTOSCOPY URETEROSCOPY WITH LITHOTRIPSY HOLMIUM LASER,  BASKET EXTRACTION, AND INSERTION STENT URETERAL;  Surgeon: Jersey Gunderson MD;  Location: [facility] OR;  Service: Urology   [3]   Social History  Tobacco Use   Smoking Status Never    Passive exposure: Past   Smokeless Tobacco Never   [4]   Family History  Problem Relation Name Age of Onset    Alzheimer's disease Mother      Other Mother          alzheimers    Cancer Father          rectal    Stroke Father      Rectal cancer Father     [5] No Known Allergies  [6]   Current Outpatient Medications:     Ascorbic " Acid (Vitamin C) 500 MG CAPS, once a week On Saturday, Disp: , Rfl:     aspirin (ECOTRIN LOW STRENGTH) 81 mg EC tablet, Take 81 mg by mouth every morning, Disp: , Rfl:     Echinacea 500 MG CAPS, once a week Saturday, Disp: , Rfl:     ezetimibe (ZETIA) 10 mg tablet, Take 1 tablet (10 mg total) by mouth daily, Disp: 90 tablet, Rfl: 1    Fluticasone Propionate (Xhance) 93 MCG/ACT EXHU, 1 spray into each nostril 2 (two) times a day, Disp: 16 mL, Rfl: 6    Ginkgo Biloba 120 MG CAPS, once a week Saturday, Disp: , Rfl:     lisinopril (ZESTRIL) 5 mg tablet, Take 1.5 tablets (7.5 mg total) by mouth daily, Disp: 135 tablet, Rfl: 2    metoprolol succinate (TOPROL-XL) 50 mg 24 hr tablet, Take 1 tablet (50 mg total) by mouth daily, Disp: 90 tablet, Rfl: 1    Multiple Vitamins-Minerals (CENTRUM SILVER 50+MEN PO), every morning, Disp: , Rfl:     Omega-3 Fatty Acids (OMEGA 3 PO), once a week On Saturday, Disp: , Rfl:     rosuvastatin (CRESTOR) 40 MG tablet, Take 1 tablet (40 mg total) by mouth every morning, Disp: 90 tablet, Rfl: 1    solifenacin (VESICARE) 5 mg tablet, take 1 tablet by mouth once daily, Disp: 90 tablet, Rfl: 1    tamsulosin (FLOMAX) 0.4 mg, Take 1 capsule (0.4 mg total) by mouth daily with dinner, Disp: 100 capsule, Rfl: 1    triamcinolone (KENALOG) 0.1 % cream, Apply topically 2 (two) times a day as needed for rash Sparingly (short term) to affected area: legs, Disp: 240 g, Rfl: 1    vitamin B-12 (VITAMIN B-12) 1,000 mcg tablet, once a week On Saturday, Disp: , Rfl:

## 2025-06-16 NOTE — PATIENT INSTRUCTIONS
"ACROCH[place] (\"SKIN TAG\")    Assessment and Plan:  Based on a thorough discussion of this condition and the management approach to it (including a comprehensive discussion of the known risks, side effects and potential benefits of treatment), the patient (family) agrees to implement the following specific plan:  Reassured benign.  Okay to be left alone.   Informed Insurance does not cover for removal $150 to treat up to 10 lesions, and if over 10 lesions, then additional $10/lesion thereafter.     Skin tags are common, soft, harmless skin lesions that are also called, in the appropriate settings, papillomas, fibroepithelial polyps, and soft fibromas.  They are made up of loosely arranged collagen fibers and blood vessels surrounded by a thickened or thinned-out epidermis.    Skin tags tend to develop in both men and women as we grow older.  They are usually found on the skin folds (neck, armpits, groin).  It is not known what specifically causes skin tags.  Certain factors, though, do appear to play a role:  Chaffing and irritation from skin rubbing together  High levels of growth factors (as seen, for example, in pregnancy or in acromegaly/gigantism)  Insulin resistance  Human papillomavirus (wart virus)    We discussed that most skin tags do not need to be treated unless they are specifically causing the patient physical distress or limitation or pose a risk for a larger problem such as an infection that forms secondary to excoriation or chronic irritation.    We had a thorough discussion of treatment options and specific risks (including that any procedural treatment may not be covered by insurance and would then be the patient's responsibility) and benefits/alternatives including but not limited to the following:  Cryotherapy (freezing)  Shave removal  Surgical excision (snip excision with scissors)  Electrosurgery  Ligation (we do not do this procedure and counseled against it due to risk of tissue necrosis and " infection)

## 2025-06-16 NOTE — PROGRESS NOTES
"[facility] Note     Patient Name: Jordin Figueroa  Encounter Date: 6/16/25       Have you been cared for by a [facility] Dermatologist in the last 3 years and, if so, which description applies to you? Yes. I have been here within the last 3 years, and my medical history has NOT changed since that time. I am not of child-bearing potential.     REVIEW OF SYSTEMS:  Have you recently had or currently have any of the following? No changes in my recent health.   PAST MEDICAL HISTORY:  Have you personally ever had or currently have any of the following?  If \"YES,\" then please provide more detail. No changes in my medical history.   HISTORY OF IMMUNOSUPPRESSION: Do you have a history of any of the following:  Systemic Immunosuppression such as Diabetes, Biologic or Immunotherapy, Chemotherapy, Organ Transplantation, Bone Marrow Transplantation or Prednisone?  No     Answering \"YES\" requires the addition of the dotphrase \"IMMUNOSUPPRESSED\" as the first diagnosis of the patient's visit.   FAMILY HISTORY:  Any \"first degree relatives\" (parent, brother, sister, or child) with the following?    No changes in my family's known health.   PATIENT EXPERIENCE:    Do you want the Dermatologist to perform a COMPLETE skin exam today including a clinical examination under the \"bra and underwear\" areas?  NO  If necessary, do we have your permission to call and leave a detailed message on your Preferred Phone number that includes your specific medical information?  Yes      Allergies[1] Current Medications[2]              Whom besides the patient is providing clinical information about today's encounter?   NO ADDITIONAL HISTORIAN (patient alone provided history)    Physical Exam and Assessment/Plan by Diagnosis:    Patient is here for growth on the left upper eyelid noticed them last week after his skin check. Patient stated growths are not bothersome.    ACROCHORDON (\"SKIN TAG\")    Physical Exam:  Anatomic Location " Affected:  left upper eyelid  Morphological Description:  2 skin colored pedunculated papules, one is barely elevated  Pertinent Positives:  Pertinent Negatives:    Additional History of Present Condition:  see above for additional history    Assessment and Plan:  Based on a thorough discussion of this condition and the management approach to it (including a comprehensive discussion of the known risks, side effects and potential benefits of treatment), the patient (family) agrees to implement the following specific plan:  Reassured benign.  Okay to be left alone.  Informed Insurance does not cover for removal $150 to treat up to 10 lesions, and if over 10 lesions, then additional $10/lesion thereafter.     Skin tags are common, soft, harmless skin lesions that are also called, in the appropriate settings, papillomas, fibroepithelial polyps, and soft fibromas.  They are made up of loosely arranged collagen fibers and blood vessels surrounded by a thickened or thinned-out epidermis.    Skin tags tend to develop in both men and women as we grow older.  They are usually found on the skin folds (neck, armpits, groin).  It is not known what specifically causes skin tags.  Certain factors, though, do appear to play a role:  Chaffing and irritation from skin rubbing together  High levels of growth factors (as seen, for example, in pregnancy or in acromegaly/gigantism)  Insulin resistance  Human papillomavirus (wart virus)    We discussed that most skin tags do not need to be treated unless they are specifically causing the patient physical distress or limitation or pose a risk for a larger problem such as an infection that forms secondary to excoriation or chronic irritation.    We had a thorough discussion of treatment options and specific risks (including that any procedural treatment may not be covered by insurance and would then be the patient's responsibility) and benefits/alternatives including but not limited to the  following:  Cryotherapy (freezing)  Shave removal  Surgical excision (snip excision with scissors)  Electrosurgery  Ligation (we do not do this procedure and counseled against it due to risk of tissue necrosis and infection)     Scribe Attestation      I,:  Alexandra Leung MA am acting as a scribe while in the presence of the attending physician.:       I,:  Lzi Arreola MD personally performed the services described in this documentation    as scribed in my presence.:                   [1] No Known Allergies  [2]   Current Outpatient Medications:     Ascorbic Acid (Vitamin C) 500 MG CAPS, once a week On Saturday, Disp: , Rfl:     aspirin (ECOTRIN LOW STRENGTH) 81 mg EC tablet, Take 81 mg by mouth every morning, Disp: , Rfl:     Echinacea 500 MG CAPS, once a week Saturday, Disp: , Rfl:     ezetimibe (ZETIA) 10 mg tablet, Take 1 tablet (10 mg total) by mouth daily, Disp: 90 tablet, Rfl: 1    Fluticasone Propionate (Xhance) 93 MCG/ACT EXHU, 1 spray into each nostril 2 (two) times a day, Disp: 16 mL, Rfl: 6    Ginkgo Biloba 120 MG CAPS, once a week Saturday, Disp: , Rfl:     lisinopril (ZESTRIL) 5 mg tablet, Take 1.5 tablets (7.5 mg total) by mouth daily, Disp: 135 tablet, Rfl: 2    metoprolol succinate (TOPROL-XL) 50 mg 24 hr tablet, Take 1 tablet (50 mg total) by mouth daily, Disp: 90 tablet, Rfl: 1    Multiple Vitamins-Minerals (CENTRUM SILVER 50+MEN PO), every morning, Disp: , Rfl:     Omega-3 Fatty Acids (OMEGA 3 PO), once a week On Saturday, Disp: , Rfl:     rosuvastatin (CRESTOR) 40 MG tablet, Take 1 tablet (40 mg total) by mouth every morning, Disp: 90 tablet, Rfl: 1    solifenacin (VESICARE) 5 mg tablet, take 1 tablet by mouth once daily, Disp: 90 tablet, Rfl: 1    tamsulosin (FLOMAX) 0.4 mg, Take 1 capsule (0.4 mg total) by mouth daily with dinner, Disp: 100 capsule, Rfl: 1    triamcinolone (KENALOG) 0.1 % cream, Apply topically 2 (two) times a day as needed for rash Sparingly (short term) to  affected area: legs, Disp: 240 g, Rfl: 1    vitamin B-12 (VITAMIN B-12) 1,000 mcg tablet, once a week On Saturday, Disp: , Rfl:

## 2025-06-17 ENCOUNTER — OFFICE VISIT (OUTPATIENT)
Dept: CARDIOLOGY CLINIC | Facility: CLINIC | Age: 70
End: 2025-06-17
Payer: COMMERCIAL

## 2025-06-17 VITALS
HEART RATE: 81 BPM | WEIGHT: 192 LBS | OXYGEN SATURATION: 97 % | BODY MASS INDEX: 28.44 KG/M2 | SYSTOLIC BLOOD PRESSURE: 124 MMHG | DIASTOLIC BLOOD PRESSURE: 70 MMHG | HEIGHT: 69 IN

## 2025-06-17 DIAGNOSIS — Z95.1 HISTORY OF CORONARY ARTERY BYPASS GRAFT: Primary | ICD-10-CM

## 2025-06-17 DIAGNOSIS — I10 PRIMARY HYPERTENSION: ICD-10-CM

## 2025-06-17 DIAGNOSIS — I25.10 CORONARY ARTERY DISEASE INVOLVING NATIVE CORONARY ARTERY, UNSPECIFIED WHETHER ANGINA PRESENT, UNSPECIFIED WHETHER NATIVE OR TRANSPLANTED HEART: ICD-10-CM

## 2025-06-17 DIAGNOSIS — Z95.5 H/O HEART ARTERY STENT: ICD-10-CM

## 2025-06-17 DIAGNOSIS — E78.49 OTHER HYPERLIPIDEMIA: ICD-10-CM

## 2025-06-17 PROCEDURE — 99214 OFFICE O/P EST MOD 30 MIN: CPT | Performed by: INTERNAL MEDICINE

## 2025-06-17 PROCEDURE — 93000 ELECTROCARDIOGRAM COMPLETE: CPT | Performed by: INTERNAL MEDICINE

## 2025-07-12 ENCOUNTER — APPOINTMENT (EMERGENCY)
Dept: RADIOLOGY | Facility: HOSPITAL | Age: 70
End: 2025-07-12
Payer: COMMERCIAL

## 2025-07-12 ENCOUNTER — HOSPITAL ENCOUNTER (EMERGENCY)
Facility: HOSPITAL | Age: 70
Discharge: HOME | End: 2025-07-12
Attending: EMERGENCY MEDICINE | Admitting: EMERGENCY MEDICINE
Payer: COMMERCIAL

## 2025-07-12 VITALS
HEART RATE: 81 BPM | SYSTOLIC BLOOD PRESSURE: 138 MMHG | OXYGEN SATURATION: 96 % | BODY MASS INDEX: 29.15 KG/M2 | TEMPERATURE: 97.8 F | WEIGHT: 197.4 LBS | DIASTOLIC BLOOD PRESSURE: 74 MMHG | RESPIRATION RATE: 19 BRPM

## 2025-07-12 DIAGNOSIS — R93.89 ABNORMAL FINDING ON CT SCAN: ICD-10-CM

## 2025-07-12 DIAGNOSIS — R42 LIGHTHEADEDNESS: Primary | ICD-10-CM

## 2025-07-12 LAB
ALBUMIN SERPL BCG-MCNC: 4.3 G/DL (ref 3.5–5)
ALP SERPL-CCNC: 45 U/L (ref 34–104)
ALT SERPL W P-5'-P-CCNC: 36 U/L (ref 7–52)
ANION GAP SERPL CALCULATED.3IONS-SCNC: 5 MMOL/L (ref 4–13)
APTT PPP: 28 SECONDS (ref 23–34)
AST SERPL W P-5'-P-CCNC: 28 U/L (ref 13–39)
ATRIAL RATE: 82 BPM
BASOPHILS # BLD AUTO: 0.05 THOUSANDS/ÂΜL (ref 0–0.1)
BASOPHILS NFR BLD AUTO: 1 % (ref 0–1)
BILIRUB SERPL-MCNC: 1.15 MG/DL (ref 0.2–1)
BUN SERPL-MCNC: 16 MG/DL (ref 5–25)
CALCIUM SERPL-MCNC: 9.2 MG/DL (ref 8.4–10.2)
CARDIAC TROPONIN I PNL SERPL HS: 3 NG/L (ref ?–50)
CHLORIDE SERPL-SCNC: 100 MMOL/L (ref 96–108)
CO2 SERPL-SCNC: 27 MMOL/L (ref 21–32)
CREAT SERPL-MCNC: 0.96 MG/DL (ref 0.6–1.3)
EOSINOPHIL # BLD AUTO: 0.06 THOUSAND/ÂΜL (ref 0–0.61)
EOSINOPHIL NFR BLD AUTO: 1 % (ref 0–6)
ERYTHROCYTE [DISTWIDTH] IN BLOOD BY AUTOMATED COUNT: 13 % (ref 11.6–15.1)
GFR SERPL CREATININE-BSD FRML MDRD: 79 ML/MIN/1.73SQ M
GLUCOSE SERPL-MCNC: 160 MG/DL (ref 65–140)
HCT VFR BLD AUTO: 46.3 % (ref 36.5–49.3)
HGB BLD-MCNC: 17 G/DL (ref 12–17)
IMM GRANULOCYTES # BLD AUTO: 0.04 THOUSAND/UL (ref 0–0.2)
IMM GRANULOCYTES NFR BLD AUTO: 1 % (ref 0–2)
INR PPP: 0.95 (ref 0.85–1.19)
LYMPHOCYTES # BLD AUTO: 2.06 THOUSANDS/ÂΜL (ref 0.6–4.47)
LYMPHOCYTES NFR BLD AUTO: 26 % (ref 14–44)
MCH RBC QN AUTO: 30.4 PG (ref 26.8–34.3)
MCHC RBC AUTO-ENTMCNC: 36.7 G/DL (ref 31.4–37.4)
MCV RBC AUTO: 83 FL (ref 82–98)
MONOCYTES # BLD AUTO: 0.4 THOUSAND/ÂΜL (ref 0.17–1.22)
MONOCYTES NFR BLD AUTO: 5 % (ref 4–12)
NEUTROPHILS # BLD AUTO: 5.44 THOUSANDS/ÂΜL (ref 1.85–7.62)
NEUTS SEG NFR BLD AUTO: 66 % (ref 43–75)
NRBC BLD AUTO-RTO: 0 /100 WBCS
P AXIS: 62 DEGREES
PLATELET # BLD AUTO: 163 THOUSANDS/UL (ref 149–390)
PMV BLD AUTO: 9.5 FL (ref 8.9–12.7)
POTASSIUM SERPL-SCNC: 3.9 MMOL/L (ref 3.5–5.3)
PR INTERVAL: 230 MS
PROT SERPL-MCNC: 6.8 G/DL (ref 6.4–8.4)
PROTHROMBIN TIME: 13.2 SECONDS (ref 12.3–15)
QRS AXIS: -18 DEGREES
QRSD INTERVAL: 132 MS
QT INTERVAL: 394 MS
QTC INTERVAL: 460 MS
RBC # BLD AUTO: 5.6 MILLION/UL (ref 3.88–5.62)
SODIUM SERPL-SCNC: 132 MMOL/L (ref 135–147)
T WAVE AXIS: 34 DEGREES
VENTRICULAR RATE: 82 BPM
WBC # BLD AUTO: 8.05 THOUSAND/UL (ref 4.31–10.16)

## 2025-07-12 PROCEDURE — 36415 COLL VENOUS BLD VENIPUNCTURE: CPT | Performed by: EMERGENCY MEDICINE

## 2025-07-12 PROCEDURE — 85025 COMPLETE CBC W/AUTO DIFF WBC: CPT | Performed by: EMERGENCY MEDICINE

## 2025-07-12 PROCEDURE — 96375 TX/PRO/DX INJ NEW DRUG ADDON: CPT

## 2025-07-12 PROCEDURE — 99284 EMERGENCY DEPT VISIT MOD MDM: CPT

## 2025-07-12 PROCEDURE — 93005 ELECTROCARDIOGRAM TRACING: CPT

## 2025-07-12 PROCEDURE — 70498 CT ANGIOGRAPHY NECK: CPT

## 2025-07-12 PROCEDURE — 85730 THROMBOPLASTIN TIME PARTIAL: CPT | Performed by: EMERGENCY MEDICINE

## 2025-07-12 PROCEDURE — 84484 ASSAY OF TROPONIN QUANT: CPT | Performed by: EMERGENCY MEDICINE

## 2025-07-12 PROCEDURE — 80053 COMPREHEN METABOLIC PANEL: CPT | Performed by: EMERGENCY MEDICINE

## 2025-07-12 PROCEDURE — 85610 PROTHROMBIN TIME: CPT | Performed by: EMERGENCY MEDICINE

## 2025-07-12 PROCEDURE — 70496 CT ANGIOGRAPHY HEAD: CPT

## 2025-07-12 PROCEDURE — 96361 HYDRATE IV INFUSION ADD-ON: CPT

## 2025-07-12 PROCEDURE — 99285 EMERGENCY DEPT VISIT HI MDM: CPT | Performed by: EMERGENCY MEDICINE

## 2025-07-12 PROCEDURE — 96374 THER/PROPH/DIAG INJ IV PUSH: CPT

## 2025-07-12 RX ORDER — METOCLOPRAMIDE HYDROCHLORIDE 5 MG/ML
10 INJECTION INTRAMUSCULAR; INTRAVENOUS ONCE
Status: COMPLETED | OUTPATIENT
Start: 2025-07-12 | End: 2025-07-12

## 2025-07-12 RX ORDER — MECLIZINE HCL 12.5 MG 12.5 MG/1
12.5 TABLET ORAL 3 TIMES DAILY PRN
Qty: 30 TABLET | Refills: 0 | Status: SHIPPED | OUTPATIENT
Start: 2025-07-12

## 2025-07-12 RX ORDER — DEXAMETHASONE SODIUM PHOSPHATE 10 MG/ML
10 INJECTION, SOLUTION INTRAMUSCULAR; INTRAVENOUS ONCE
Status: COMPLETED | OUTPATIENT
Start: 2025-07-12 | End: 2025-07-12

## 2025-07-12 RX ORDER — DIPHENHYDRAMINE HYDROCHLORIDE 50 MG/ML
25 INJECTION, SOLUTION INTRAMUSCULAR; INTRAVENOUS ONCE
Status: COMPLETED | OUTPATIENT
Start: 2025-07-12 | End: 2025-07-12

## 2025-07-12 RX ORDER — MECLIZINE HCL 12.5 MG 12.5 MG/1
12.5 TABLET ORAL ONCE
Status: COMPLETED | OUTPATIENT
Start: 2025-07-12 | End: 2025-07-12

## 2025-07-12 RX ADMIN — DIPHENHYDRAMINE HYDROCHLORIDE 25 MG: 50 INJECTION, SOLUTION INTRAMUSCULAR; INTRAVENOUS at 15:12

## 2025-07-12 RX ADMIN — IOHEXOL 85 ML: 350 INJECTION, SOLUTION INTRAVENOUS at 17:01

## 2025-07-12 RX ADMIN — DEXAMETHASONE SODIUM PHOSPHATE 10 MG: 10 INJECTION, SOLUTION INTRAMUSCULAR; INTRAVENOUS at 15:13

## 2025-07-12 RX ADMIN — MECLIZINE HYDROCHLORIDE 12.5 MG: 12.5 TABLET ORAL at 18:08

## 2025-07-12 RX ADMIN — METOCLOPRAMIDE 10 MG: 5 INJECTION, SOLUTION INTRAMUSCULAR; INTRAVENOUS at 15:13

## 2025-07-12 RX ADMIN — SODIUM CHLORIDE 1000 ML: 0.9 INJECTION, SOLUTION INTRAVENOUS at 15:11

## 2025-07-12 NOTE — ED PROVIDER NOTES
Time reflects when diagnosis was documented in both MDM as applicable and the Disposition within this note       Time User Action Codes Description Comment    7/12/2025  4:38 PM Myrna Byrd Add [R42] Lightheadedness           ED Disposition       None          Assessment & Plan       Medical Decision Making  Pt is a 68yo M who presents with lightheadedness. Exam pertinent for neuro intact pt.    Differential diagnosis to include but not limited to intracranial abnormality, dehydration, electrolyte abnormality, arrhythmia.  Patient is completely neuro intact including gait and Romberg.  Patient with an NIH of 0.  Will obtain labs and imaging but no indication for stroke alert at this time. See ED course for results and details.    Still awaiting CT read at time of sign out. Pt signed out to oncoming physician.          Amount and/or Complexity of Data Reviewed  Labs: ordered. Decision-making details documented in ED Course.  Radiology: ordered.  ECG/medicine tests:  Decision-making details documented in ED Course.    Risk  Prescription drug management.        ED Course as of 07/12/25 1638   Sat Jul 12, 2025   1510 CBC and differential  Reviewed and without actionable derangement.    1524 ECG 12 lead  Procedure Note: EKG  Date/Time: 07/12/25 4:04 PM   Interpreted by: Myrna Byrd MD  Indications / Diagnosis: Lightheadedness  ECG reviewed by me, the ED Physician: yes   The EKG demonstrates:  Rhythm: normal sinus  Intervals: prolonged CT consistent with 1st degree AV block  Axis: normal axis  QRS/Blocks: RBBB  ST Changes: No acute ST Changes, no STD/NEGRA.   1526 Comprehensive metabolic panel(!)  Reviewed and without actionable derangement.    1526 PTT: 28  WNL   1526 POCT INR: 0.95  WNL   1528 ECG 12 lead  Procedure Note: EKG  Date/Time: 07/12/25 3:28 PM   Interpreted by: Myrna Byrd MD  Indications / Diagnosis: Lightheaded  ECG reviewed by me, the ED Physician: yes   The EKG demonstrates:  Rhythm:  normal sinus  Intervals: prolonged MD  Axis: normal axis  QRS/Blocks: RBBB  ST Changes: No acute ST Changes, no STD/NEGRA.   1532 hs TnI 0hr: 3  WNL. No indication for delta.        Medications   sodium chloride 0.9 % bolus 1,000 mL (1,000 mL Intravenous New Bag 7/12/25 1511)   dexamethasone (PF) (DECADRON) injection 10 mg (10 mg Intravenous Given 7/12/25 1513)   metoclopramide (REGLAN) injection 10 mg (10 mg Intravenous Given 7/12/25 1513)   diphenhydrAMINE (BENADRYL) injection 25 mg (25 mg Intravenous Given 7/12/25 1512)       ED Risk Strat Scores                    (ISAR) Identification of Seniors at Risk  Before the illness or injury that brought you to the Emergency, did you need someone to help you on a regular basis?: 0  In the last 24 hours, have you needed more help than usual?: 0  Have you been hospitalized for one or more nights during the past 6 months?: 0  In general, do you see well?: 0  In general, do you have serious problems with your memory?: 0  ISAR Score: 0            SBIRT 20yo+      Flowsheet Row Most Recent Value   Initial Alcohol Screen: US AUDIT-C     1. How often do you have a drink containing alcohol? 0 Filed at: 07/12/2025 1458   3a. Male UNDER 65: How often do you have five or more drinks on one occasion? 0 Filed at: 07/12/2025 1456   Audit-C Score 0 Filed at: 07/12/2025 8096   MOOSE: How many times in the past year have you...    Used an illegal drug or used a prescription medication for non-medical reasons? Never Filed at: 07/12/2025 9206                            History of Present Illness       Chief Complaint   Patient presents with    CVA/TIA-like Symptoms     States he started at 11a or 12 noon with blurred vision, dizziness and unsteady gait. States he does have a hx of migraines but states never developed pain. States his BP was high when he took it.        Past Medical History[1]   Past Surgical History[2]   Family History[3]   Social History[4]   E-Cigarette/Vaping     E-Cigarette Use Never User       E-Cigarette/Vaping Substances    Nicotine No     THC No     CBD No     Flavoring No     Other No     Unknown No       I have reviewed and agree with the history as documented.     Pt is a 68yo M who presents for lightheadedness.  Patient reports that approximately 11 AM, he began with lightheadedness, bilateral blurred vision, and gait abnormality.  Patient reports that he was not weak but felt as though he was unsteady on his feet.  Patient reports he has history of migraines and even though he did not have a headache took Tylenol.  Patient reports he rested to try and wait out his symptoms.  Patient reports that he has had improvement in symptoms, however they are still persistent.  Patient reports his vision is almost back to baseline, however he still feels unsteady on his feet.  Patient denies any current vertiginous spinning.  Patient reports prior to this he was feeling well.  Patient reports he is on aspirin.  Patient denies any recent head injury.          Objective       ED Triage Vitals [07/12/25 1454]   Temperature Pulse Blood Pressure Respirations SpO2 Patient Position - Orthostatic VS   (!) 97.4 °F (36.3 °C) 80 (!) 173/82 20 96 % Sitting      Temp Source Heart Rate Source BP Location FiO2 (%) Pain Score    Tympanic Monitor Left arm -- No Pain      Vitals      Date and Time Temp Pulse SpO2 Resp BP Pain Score FACES Pain Rating User   07/12/25 1613 97.8 °F (36.6 °C) 84 94 % 18 165/78 -- --    07/12/25 1530 -- 82 95 % 18 188/81 -- --    07/12/25 1454 97.4 °F (36.3 °C) 80 96 % 20 173/82 No Pain --             Physical Exam  Vitals reviewed.   Constitutional:       General: He is not in acute distress.     Appearance: He is well-developed. He is not toxic-appearing or diaphoretic.   HENT:      Head: Normocephalic and atraumatic.      Right Ear: External ear normal.      Left Ear: External ear normal.      Nose: Nose normal.      Mouth/Throat:      Pharynx: Oropharynx is  clear.     Eyes:      General: No visual field deficit.     Extraocular Movements: Extraocular movements intact.      Conjunctiva/sclera: Conjunctivae normal.      Pupils: Pupils are equal, round, and reactive to light.       Cardiovascular:      Rate and Rhythm: Normal rate and regular rhythm.      Heart sounds: Normal heart sounds.   Pulmonary:      Effort: Pulmonary effort is normal. No respiratory distress.      Breath sounds: Normal breath sounds.   Abdominal:      General: Bowel sounds are normal. There is no distension.      Palpations: Abdomen is soft.      Tenderness: There is no abdominal tenderness.     Musculoskeletal:         General: Normal range of motion.      Cervical back: Normal range of motion and neck supple.     Skin:     General: Skin is warm and dry.      Capillary Refill: Capillary refill takes less than 2 seconds.      Coloration: Skin is not pale.     Neurological:      General: No focal deficit present.      Mental Status: He is alert and oriented to person, place, and time.      Cranial Nerves: No cranial nerve deficit, dysarthria or facial asymmetry.      Sensory: No sensory deficit.      Motor: No weakness, tremor, atrophy or abnormal muscle tone.      Coordination: Romberg sign negative. Coordination normal. Finger-Nose-Finger Test and Heel to Shin Test normal.      Gait: Gait is intact. Gait normal.     Psychiatric:         Speech: Speech normal.         Behavior: Behavior is cooperative.         Results Reviewed       Procedure Component Value Units Date/Time    HS Troponin 0hr (reflex protocol) [[id number]]  (Normal) Collected: 07/12/25 1505    Lab Status: Final result Specimen: Blood from Arm, Right Updated: 07/12/25 1532     hs TnI 0hr 3 ng/L     Comprehensive metabolic panel [[id number]]  (Abnormal) Collected: 07/12/25 1505    Lab Status: Final result Specimen: Blood from Arm, Right Updated: 07/12/25 1525     Sodium 132 mmol/L      Potassium 3.9 mmol/L      Chloride 100 mmol/L       CO2 27 mmol/L      ANION GAP 5 mmol/L      BUN 16 mg/dL      Creatinine 0.96 mg/dL      Glucose 160 mg/dL      Calcium 9.2 mg/dL      AST 28 U/L      ALT 36 U/L      Alkaline Phosphatase 45 U/L      Total Protein 6.8 g/dL      Albumin 4.3 g/dL      Total Bilirubin 1.15 mg/dL      eGFR 79 ml/min/1.73sq m     Narrative:      National Kid[employer] Disease Foundation guidelines for Chronic Kidney Disease (CKD):     Stage 1 with normal or high GFR (GFR > 90 mL/min/1.73 square meters)    Stage 2 Mild CKD (GFR = 60-89 mL/min/1.73 square meters)    Stage 3A Moderate CKD (GFR = 45-59 mL/min/1.73 square meters)    Stage 3B Moderate CKD (GFR = 30-44 mL/min/1.73 square meters)    Stage 4 Severe CKD (GFR = 15-29 mL/min/1.73 square meters)    Stage 5 End Stage CKD (GFR <15 mL/min/1.73 square meters)  Note: GFR calculation is accurate only with a steady state creatinine    Protime-INR [[id number]]  (Normal) Collected: 07/12/25 1505    Lab Status: Final result Specimen: Blood from Arm, Right Updated: 07/12/25 1522     Protime 13.2 seconds      INR 0.95    Narrative:      INR Therapeutic Range    Indication                                             INR Range      Atrial Fibrillation                                               2.0-3.0  Hypercoagulable State                                    2.0.2.3  Left Ventricular Asist Device                            2.0-3.0  Mechanical Heart Valve                                  -    Aortic(with afib, MI, embolism, HF, LA enlargement,    and/or coagulopathy)                                     2.0-3.0 (2.5-3.5)     Mitral                                                             2.5-3.5  Prosthetic/Bioprosthetic Heart Valve               2.0-3.0  Venous thromboembolism (VTE: VT, PE        2.0-3.0    APTT [[id number]]  (Normal) Collected: 07/12/25 1505    Lab Status: Final result Specimen: Blood from Arm, Right Updated: 07/12/25 1522     PTT 28 seconds     CBC and differential [[id number]]  Collected: 25 1505    Lab Status: Final result Specimen: Blood from Arm, Right Updated: 25 1510     WBC 8.05 Thousand/uL      RBC 5.60 Million/uL      Hemoglobin 17.0 g/dL      Hematocrit 46.3 %      MCV 83 fL      MCH 30.4 pg      MCHC 36.7 g/dL      RDW 13.0 %      MPV 9.5 fL      Platelets 163 Thousands/uL      nRBC 0 /100 WBCs      Segmented % 66 %      Immature Grans % 1 %      Lymphocytes % 26 %      Monocytes % 5 %      Eosinophils Relative 1 %      Basophils Relative 1 %      Absolute Neutrophils 5.44 Thousands/µL      Absolute Immature Grans 0.04 Thousand/uL      Absolute Lymphocytes 2.06 Thousands/µL      Absolute Monocytes 0.40 Thousand/µL      Eosinophils Absolute 0.06 Thousand/µL      Basophils Absolute 0.05 Thousands/µL             CTA head and neck with and without contrast    (Results Pending)       Procedures    ED Medication and Procedure Management   Prior to Admission Medications   Prescriptions Last Dose Informant Patient Reported? Taking?   Ascorbic Acid (Vitamin C) 500 MG CAPS  Self Yes No   Sig: once a week On Saturday   Echinacea 500 MG CAPS  Self Yes No   Sig: once a week Saturday   Fluticasone Propionate (Xhance) 93 MCG/ACT EXHU   No No   Si spray into each nostril 2 (two) times a day   Ginkgo Biloba 120 MG CAPS  Self Yes No   Sig: once a week Saturday   Multiple Vitamins-Minerals (CENTRUM SILVER 50+MEN PO)  Self Yes No   Sig: every morning   Omega-3 Fatty Acids (OMEGA 3 PO)  Self Yes No   Sig: once a week On Saturday   aspirin (ECOTRIN LOW STRENGTH) 81 mg EC tablet  Self Yes No   Sig: Take 81 mg by mouth every morning   ezetimibe (ZETIA) 10 mg tablet   No No   Sig: Take 1 tablet (10 mg total) by mouth daily   lisinopril (ZESTRIL) 5 mg tablet   No No   Sig: Take 1.5 tablets (7.5 mg total) by mouth daily   metoprolol succinate (TOPROL-XL) 50 mg 24 hr tablet   No No   Sig: Take 1 tablet (50 mg total) by mouth daily   rosuvastatin (CRESTOR) 40 MG tablet   No No   Sig: Take 1  tablet (40 mg total) by mouth every morning   solifenacin (VESICARE) 5 mg tablet   No No   Sig: take 1 tablet by mouth once daily   tamsulosin (FLOMAX) 0.4 mg   No No   Sig: Take 1 capsule (0.4 mg total) by mouth daily with dinner   triamcinolone (KENALOG) 0.1 % cream   No No   Sig: Apply topically 2 (two) times a day as needed for rash Sparingly (short term) to affected area: legs   vitamin B-12 (VITAMIN B-12) 1,000 mcg tablet  Self Yes No   Sig: once a week On Saturday      Facility-Administered Medications: None     Patient's Medications   Discharge Prescriptions    No medications on file     No discharge procedures on file.  ED SEPSIS DOCUMENTATION   Time reflects when diagnosis was documented in both MDM as applicable and the Disposition within this note       Time User Action Codes Description Comment    7/12/2025  4:38 PM Myrna Byrd Add [R42] Lightheadedness                    [1]   Past Medical History:  Diagnosis Date    ANIVAL (acute kidney injury) (HCC) 09/02/2024    Coronary artery disease     HTN (hypertension)     Hyperlipidemia     Hypertension     Migraine     Myocardial infarction (HCC) 1999    one stent   [2]   Past Surgical History:  Procedure Laterality Date    ANGIOPLASTY  1999    one stent    COLONOSCOPY  2015    CORONARY ARTERY BYPASS GRAFT  2009    x4    FL RETROGRADE PYELOGRAM  9/5/2024    LASIK      OK CYSTO/URETERO W/LITHOTRIPSY &INDWELL STENT INSRT Left 9/5/2024    Procedure: CYSTOSCOPY URETEROSCOPY, STONE MANIPULATION, RETROGRADE PYELOGRAM AND INSERTION STENT URETERAL;  Surgeon: Jersey Gunderson MD;  Location: [facility];  Service: Urology    OK CYSTO/URETERO W/LITHOTRIPSY &INDWELL STENT INSRT Left 9/19/2024    Procedure: CYSTOSCOPY URETEROSCOPY WITH LITHOTRIPSY HOLMIUM LASER,  BASKET EXTRACTION, AND INSERTION STENT URETERAL;  Surgeon: Jersey Gunderson MD;  Location: [facility];  Service: Urology   [3]   Family History  Problem Relation Name Age of Onset    Alzheimer's disease Mother       Other Mother          alzheimers    Cancer Father          rectal    Stroke Father      Rectal cancer Father     [4]   Social History  Tobacco Use    Smoking status: Never     Passive exposure: Past    Smokeless tobacco: Never   Vaping Use    Vaping status: Never Used   Substance Use Topics    Alcohol use: Yes     Comment: seldom    Drug use: Never        Myrna Byrd MD  07/12/25 6438

## 2025-07-12 NOTE — ED CARE HANDOFF
Emergency Department Sign Out Note        Sign out and transfer of care from Dr. Myrna Byrd. See Separate Emergency Department note.     The patient, Jordin Figueroa, was evaluated by the previous provider for Dizziness.    Workup Completed:  Labs, migraine cocktail    ED Course / Workup Pending (followup):  CTA pending         (ISAR) Identification of Seniors at Risk  Before the illness or injury that brought you to the Emergency, did you need someone to help you on a regular basis?: 0  In the last 24 hours, have you needed more help than usual?: 0  Have you been hospitalized for one or more nights during the past 6 months?: 0  In general, do you see well?: 0  In general, do you have serious problems with your memory?: 0  ISAR Score: 0                              ED Course as of 07/13/25 1257   Sat Jul 12, 2025   1640 SO: Dizziness/unsteadiness. CTA pending. Migraine cocktail given. Can go home if sx improved     Procedures  Medical Decision Making  Patient presented for evaluation of dizziness.  On reevaluation, patient resting comfortably in bed.  He is in no acute distress.  Symptoms have improved.  He has been able to ambulate without difficulty.  CTA shows Mild aneurysmal dilatation of the aortic root at the level the sinuses of Valsalva measuring up to 4.0 cm in maximal dimensions but otherwise no acute findings. Discussed results with patient at bedside.  He feels comfortable going home at this time.  He was encouraged to follow-up with PCP for further management.  He was agreeable to plan.  He was given return precautions and discharged in stable condition.    Amount and/or Complexity of Data Reviewed  Labs: ordered.  Radiology: ordered.    Risk  Prescription drug management.            Disposition  Final diagnoses:   Lightheadedness   Abnormal finding on CT scan     Time reflects when diagnosis was documented in both MDM as applicable and the Disposition within this note       Time User Action  Codes Description Comment    7/12/2025  4:38 PM Myrna Byrd Add [R42] Lightheadedness     7/12/2025  5:41 PM Flory Hall Add [R93.89] Abnormal finding on CT scan           ED Disposition       ED Disposition   Discharge    Condition   Stable    Date/Time   Sat Jul 12, 2025  5:37 PM    Comment   Jordin Figueroa discharge to home/self care.                   Follow-up Information       Follow up With Specialties Details Why Contact Info    Samir Jacome DO Family Medicine   [address] [place] [address]  [phone]            Discharge Medication List as of 7/12/2025  5:43 PM        START taking these medications    Details   meclizine (ANTIVERT) 12.5 MG tablet Take 1 tablet (12.5 mg total) by mouth 3 (three) times a day as needed for dizziness, Starting Sat 7/12/2025, Normal           CONTINUE these medications which have NOT CHANGED    Details   Ascorbic Acid (Vitamin C) 500 MG CAPS once a week On Saturday, Starting Sun 1/3/2010, Historical Med      aspirin (ECOTRIN LOW STRENGTH) 81 mg EC tablet Take 81 mg by mouth every morning, Historical Med      Echinacea 500 MG CAPS once a week Saturday, Starting Sun 1/3/2010, Historical Med      ezetimibe (ZETIA) 10 mg tablet Take 1 tablet (10 mg total) by mouth daily, Starting Mon 2/24/2025, Normal      Fluticasone Propionate (Xhance) 93 MCG/ACT EXHU 1 spray into each nostril 2 (two) times a day, Starting Thu 10/3/2024, Normal      Ginkgo Biloba 120 MG CAPS once a week Saturday, Starting Sun 1/3/2010, Historical Med      lisinopril (ZESTRIL) 5 mg tablet Take 1.5 tablets (7.5 mg total) by mouth daily, Starting Tue 12/10/2024, Normal      metoprolol succinate (TOPROL-XL) 50 mg 24 hr tablet Take 1 tablet (50 mg total) by mouth daily, Starting Mon 2/24/2025, Normal      Multiple Vitamins-Minerals (CENTRUM SILVER 50+MEN PO) every morning, Starting Sun 1/3/2010, Historical Med      Omega-3 Fatty Acids (OMEGA 3 PO) once a week On Saturday, Starting  Sun 1/3/2010, Historical Med      rosuvastatin (CRESTOR) 40 MG tablet Take 1 tablet (40 mg total) by mouth every morning, Starting Mon 2/24/2025, Normal      solifenacin (VESICARE) 5 mg tablet take 1 tablet by mouth once daily, Starting Tue 5/13/2025, Normal      tamsulosin (FLOMAX) 0.4 mg Take 1 capsule (0.4 mg total) by mouth daily with dinner, Starting Mon 2/24/2025, Normal      triamcinolone (KENALOG) 0.1 % cream Apply topically 2 (two) times a day as needed for rash Sparingly (short term) to affected area: legs, Starting Mon 2/24/2025, Normal      vitamin B-12 (VITAMIN B-12) 1,000 mcg tablet once a week On Saturday, Starting Sun 1/3/2010, Historical Med           No discharge procedures on file.       ED Provider  Electronically Signed by     Flory Hall MD  07/13/25 1257

## 2025-07-12 NOTE — DISCHARGE INSTRUCTIONS
You were seen in the Emergency Department today for lightheadedness.    CT shows: CT Brain: No acute intracranial abnormality.     CT Angiography: No large vessel occlusion, high-grade stenosis, or intracranial aneurysm identified on CT angiogram of the head.     No hemodynamically significant stenosis or dissection identified on CT angiogram of the neck.     Mild aneurysmal dilatation of the aortic root at the level the sinuses of Valsalva measuring up to 4.0 cm in maximal dimensions.    Please follow up with your primary care doctor regarding CT findings and your ED visit today.   Please return to the Emergency Department if you experience worsening of your current symptoms, change in mental status, blurry vision, weakness, numbness, difficulty walking,  or any other concerning symptoms.

## 2025-07-14 ENCOUNTER — TELEPHONE (OUTPATIENT)
Age: 70
End: 2025-07-14

## 2025-07-14 LAB
ATRIAL RATE: 82 BPM
P AXIS: 62 DEGREES
PR INTERVAL: 230 MS
QRS AXIS: -18 DEGREES
QRSD INTERVAL: 132 MS
QT INTERVAL: 394 MS
QTC INTERVAL: 460 MS
T WAVE AXIS: 34 DEGREES
VENTRICULAR RATE: 82 BPM

## 2025-07-14 PROCEDURE — 93010 ELECTROCARDIOGRAM REPORT: CPT | Performed by: INTERNAL MEDICINE

## 2025-07-14 NOTE — TELEPHONE ENCOUNTER
Reviewed hosp records  He is feeling better  Maybe from outdoor work in the heat  Suggest gatorade for slightly low sodium and activities as tolerated

## 2025-07-14 NOTE — TELEPHONE ENCOUNTER
Patient called in stated spoke to someone about an appointment and was scheduled for this Friday at 3:15 with provider for ED follow up. Not on chart and no available appointment. Patient would like a call back from provider about follow up appointment and to be scheduled. Please advise. Thank you

## 2025-07-14 NOTE — TELEPHONE ENCOUNTER
Patient was in ER on 07/12/25, he scheduled an appointment with you for a follow up on 07/22/25 which is your first available. He wants to know if you could call him. He has questions about returning to his normal routines and doesn't want to wait until his appointment.    [Good] : ~his/her~ current health as good [Very Good] : ~his/her~  mood as very good [No falls in past year] : Patient reported no falls in the past year [0] : 2) Feeling down, depressed, or hopeless: Not at all (0) [With Significant Other] : lives with significant other [Employed] : employed [Feels Safe at Home] : Feels safe at home [Reports normal functional visual acuity (ie: able to read med bottle)] : Reports normal functional visual acuity [Current] : Current [10-14] : 10-14 [NO] : No [No] : In the past 12 months have you used drugs other than those required for medical reasons? No [PHQ-2 Negative - No further assessment needed] : PHQ-2 Negative - No further assessment needed [HIV test declined] : HIV test declined [Hepatitis C test declined] : Hepatitis C test declined [High School] : high school [Fully functional (bathing, dressing, toileting, transferring, walking, feeding)] : Fully functional (bathing, dressing, toileting, transferring, walking, feeding) [Fully functional (using the telephone, shopping, preparing meals, housekeeping, doing laundry, using] : Fully functional and needs no help or supervision to perform IADLs (using the telephone, shopping, preparing meals, housekeeping, doing laundry, using transportation, managing medications and managing finances) [Smoke Detector] : smoke detector [Carbon Monoxide Detector] : carbon monoxide detector [Safety elements used in home] : safety elements used in home [Seat Belt] :  uses seat belt [de-identified] : full body workout [de-identified] : good [[id number]] : 0 [Change in mental status noted] : No change in mental status noted [Reports changes in hearing] : Reports no changes in hearing [Reports changes in vision] : Reports no changes in vision [Reports changes in dental health] : Reports no changes in dental health [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

## 2025-07-15 ENCOUNTER — RA CDI HCC (OUTPATIENT)
Dept: OTHER | Facility: HOSPITAL | Age: 70
End: 2025-07-15

## 2025-07-16 ENCOUNTER — NURSE TRIAGE (OUTPATIENT)
Dept: FAMILY MEDICINE CLINIC | Facility: CLINIC | Age: 70
End: 2025-07-16

## 2025-07-16 NOTE — TELEPHONE ENCOUNTER
Spoke with pt  He is feeling much better.  Pt did not wish to make his apt earlier.  Pt has an apt on 07/22/2025.  Pt stated he is feeling much better.  Pt also aware if he becomes dizzy again or feels he is worsening  to either go to the ER or call the Office for an earlier apt.  rmklpn

## 2025-07-16 NOTE — TELEPHONE ENCOUNTER
Regarding: ER follow up  ----- Message from [facility] sent at 7/14/2025  8:29 AM EDT -----  Patient was into the Emergency Room on Saturday for dizziness and unsteadiness, high blood pressure, patient was send home with pills for dizziness and told to follow up for high blood pressure as soon as possible, which he says has gone down since. Warm transfer unsuccessful. Please call patient to schedule

## 2025-07-16 NOTE — TELEPHONE ENCOUNTER
Spoke with pt.  He is feeling much better.  Pt does have an apt on 07/22/2025  Pt does not wish to have an earlier apt.  Pt is aware to call for an earlier apt or go back to the ER if he feels worse.  reyna

## 2025-07-22 ENCOUNTER — OFFICE VISIT (OUTPATIENT)
Dept: FAMILY MEDICINE CLINIC | Facility: CLINIC | Age: 70
End: 2025-07-22
Payer: COMMERCIAL

## 2025-07-22 VITALS
TEMPERATURE: 97.7 F | WEIGHT: 194 LBS | HEIGHT: 69 IN | RESPIRATION RATE: 20 BRPM | HEART RATE: 69 BPM | BODY MASS INDEX: 28.73 KG/M2 | DIASTOLIC BLOOD PRESSURE: 74 MMHG | OXYGEN SATURATION: 98 % | SYSTOLIC BLOOD PRESSURE: 132 MMHG

## 2025-07-22 DIAGNOSIS — E78.49 OTHER HYPERLIPIDEMIA: ICD-10-CM

## 2025-07-22 DIAGNOSIS — R73.03 PREDIABETES: ICD-10-CM

## 2025-07-22 DIAGNOSIS — I71.21 ANEURYSM OF ASCENDING AORTA WITHOUT RUPTURE (HCC): Primary | ICD-10-CM

## 2025-07-22 DIAGNOSIS — I25.10 CORONARY ARTERY DISEASE INVOLVING NATIVE CORONARY ARTERY OF NATIVE HEART WITHOUT ANGINA PECTORIS: ICD-10-CM

## 2025-07-22 PROCEDURE — 99214 OFFICE O/P EST MOD 30 MIN: CPT | Performed by: FAMILY MEDICINE

## 2025-07-22 PROCEDURE — G2211 COMPLEX E/M VISIT ADD ON: HCPCS | Performed by: FAMILY MEDICINE

## 2025-07-22 NOTE — PROGRESS NOTES
"Name: Jordin Figueroa      : 1955      MRN: [id number]  Encounter Provider: Samir Jacome DO  Encounter Date: 2025   Encounter department: [facility]  :  Assessment & Plan  Aneurysm of ascending aorta without rupture (HCC)  He  sees a cardiologist  Referral for surveillance  Orders:    Ambulatory referral to Cardiology; Future    Prediabetes  Watch diet       Coronary artery disease involving native coronary artery of native heart without angina pectoris  stable       Other hyperlipidemia  Stable  Last LDL 48  Cont statin       Possible BPPV  Suggest balance PT if indicated, he has appt with ENT  If upcoming routine labs ok next month, then can f/u in office 6m after that     History of Present Illness   HPI  Dizzy in ER  No migraine  Improved in ER    Review of Systems   Constitutional:  Negative for fever.   Respiratory:  Negative for shortness of breath.    Cardiovascular:  Negative for chest pain.       Objective   /74   Pulse 69   Temp 97.7 °F (36.5 °C)   Resp 20   Ht 5' 9\" (1.753 m)   Wt 88 kg (194 lb)   SpO2 98%   BMI 28.65 kg/m²      Physical Exam  Vitals and nursing note reviewed.   Constitutional:       General: He is not in acute distress.     Appearance: He is well-developed. He is not ill-appearing.   HENT:      Head: Normocephalic.      Right Ear: Tympanic membrane normal.      Left Ear: Tympanic membrane normal.      Nose: No congestion.     Eyes:      General: No scleral icterus.     Conjunctiva/sclera: Conjunctivae normal.     Neck:      Vascular: No carotid bruit.     Cardiovascular:      Rate and Rhythm: Normal rate and regular rhythm.   Pulmonary:      Effort: Pulmonary effort is normal. No respiratory distress.      Breath sounds: No rales.   Abdominal:      Palpations: Abdomen is soft.     Musculoskeletal:         General: No deformity.      Cervical back: Neck supple.     Skin:     General: Skin is warm and dry.      Coloration: Skin is not pale. "     Neurological:      Mental Status: He is alert.      Motor: No weakness.      Gait: Gait normal.     Psychiatric:         Mood and Affect: Mood normal.         Behavior: Behavior normal.         Thought Content: Thought content normal.

## 2025-07-22 NOTE — ASSESSMENT & PLAN NOTE
He  sees a cardiologist  Referral for surveillance  Orders:    Ambulatory referral to Cardiology; Future

## 2025-07-28 ENCOUNTER — OFFICE VISIT (OUTPATIENT)
Dept: CARDIOLOGY CLINIC | Facility: CLINIC | Age: 70
End: 2025-07-28
Payer: COMMERCIAL

## 2025-07-28 VITALS
HEIGHT: 69 IN | HEART RATE: 86 BPM | OXYGEN SATURATION: 95 % | WEIGHT: 190 LBS | SYSTOLIC BLOOD PRESSURE: 142 MMHG | DIASTOLIC BLOOD PRESSURE: 80 MMHG | BODY MASS INDEX: 28.14 KG/M2

## 2025-07-28 DIAGNOSIS — I10 PRIMARY HYPERTENSION: ICD-10-CM

## 2025-07-28 DIAGNOSIS — I71.21 ANEURYSM OF ASCENDING AORTA WITHOUT RUPTURE (HCC): ICD-10-CM

## 2025-07-28 DIAGNOSIS — I25.10 CORONARY ARTERY DISEASE INVOLVING NATIVE CORONARY ARTERY, UNSPECIFIED WHETHER ANGINA PRESENT, UNSPECIFIED WHETHER NATIVE OR TRANSPLANTED HEART: Primary | ICD-10-CM

## 2025-07-28 DIAGNOSIS — I25.10 CORONARY ARTERY DISEASE INVOLVING NATIVE CORONARY ARTERY OF NATIVE HEART WITHOUT ANGINA PECTORIS: ICD-10-CM

## 2025-07-28 DIAGNOSIS — Z95.5 H/O HEART ARTERY STENT: ICD-10-CM

## 2025-07-28 DIAGNOSIS — E78.49 OTHER HYPERLIPIDEMIA: ICD-10-CM

## 2025-07-28 DIAGNOSIS — Z95.1 HISTORY OF CORONARY ARTERY BYPASS GRAFT: ICD-10-CM

## 2025-07-28 PROCEDURE — 93000 ELECTROCARDIOGRAM COMPLETE: CPT | Performed by: INTERNAL MEDICINE

## 2025-07-28 PROCEDURE — 99214 OFFICE O/P EST MOD 30 MIN: CPT | Performed by: INTERNAL MEDICINE

## 2025-07-28 RX ORDER — LISINOPRIL 10 MG/1
10 TABLET ORAL DAILY
Qty: 90 TABLET | Refills: 2 | Status: SHIPPED | OUTPATIENT
Start: 2025-07-28

## 2025-08-05 ENCOUNTER — APPOINTMENT (OUTPATIENT)
Dept: LAB | Facility: CLINIC | Age: 70
End: 2025-08-05
Attending: FAMILY MEDICINE
Payer: COMMERCIAL

## 2025-08-05 DIAGNOSIS — R73.03 PREDIABETES: ICD-10-CM

## 2025-08-05 DIAGNOSIS — Z12.5 ENCOUNTER FOR SCREENING FOR MALIGNANT NEOPLASM OF PROSTATE: ICD-10-CM

## 2025-08-05 DIAGNOSIS — I10 PRIMARY HYPERTENSION: ICD-10-CM

## 2025-08-05 DIAGNOSIS — E78.49 OTHER HYPERLIPIDEMIA: ICD-10-CM

## 2025-08-05 LAB
ALBUMIN SERPL BCG-MCNC: 4.3 G/DL (ref 3.5–5)
ALP SERPL-CCNC: 42 U/L (ref 34–104)
ALT SERPL W P-5'-P-CCNC: 35 U/L (ref 7–52)
ANION GAP SERPL CALCULATED.3IONS-SCNC: 7 MMOL/L (ref 4–13)
AST SERPL W P-5'-P-CCNC: 27 U/L (ref 13–39)
BILIRUB SERPL-MCNC: 1.06 MG/DL (ref 0.2–1)
BUN SERPL-MCNC: 14 MG/DL (ref 5–25)
CALCIUM SERPL-MCNC: 9.2 MG/DL (ref 8.4–10.2)
CHLORIDE SERPL-SCNC: 104 MMOL/L (ref 96–108)
CHOLEST SERPL-MCNC: 128 MG/DL (ref ?–200)
CO2 SERPL-SCNC: 28 MMOL/L (ref 21–32)
CREAT SERPL-MCNC: 0.99 MG/DL (ref 0.6–1.3)
EST. AVERAGE GLUCOSE BLD GHB EST-MCNC: 108 MG/DL
GFR SERPL CREATININE-BSD FRML MDRD: 76 ML/MIN/1.73SQ M
GLUCOSE P FAST SERPL-MCNC: 109 MG/DL (ref 65–99)
HBA1C MFR BLD: 5.4 %
HDLC SERPL-MCNC: 47 MG/DL
LDLC SERPL CALC-MCNC: 42 MG/DL (ref 0–100)
POTASSIUM SERPL-SCNC: 4.5 MMOL/L (ref 3.5–5.3)
PROT SERPL-MCNC: 6.7 G/DL (ref 6.4–8.4)
PSA SERPL-MCNC: 0.67 NG/ML (ref 0–4)
SODIUM SERPL-SCNC: 139 MMOL/L (ref 135–147)
TRIGL SERPL-MCNC: 197 MG/DL (ref ?–150)

## 2025-08-05 PROCEDURE — 83036 HEMOGLOBIN GLYCOSYLATED A1C: CPT

## 2025-08-05 PROCEDURE — 80053 COMPREHEN METABOLIC PANEL: CPT

## 2025-08-05 PROCEDURE — 80061 LIPID PANEL: CPT

## 2025-08-05 PROCEDURE — G0103 PSA SCREENING: HCPCS

## 2025-08-05 PROCEDURE — 36415 COLL VENOUS BLD VENIPUNCTURE: CPT

## (undated) DEVICE — TOWEL SET X-RAY

## (undated) DEVICE — FABRIC REINFORCED, SURGICAL GOWN, XL: Brand: CONVERTORS

## (undated) DEVICE — POUCH URO CATCHER II STERILE

## (undated) DEVICE — SPONGE 4 X 4 XRAY 16 PLY STRL LF RFD

## (undated) DEVICE — CYSTO TUBING TUR Y IRRIGATION

## (undated) DEVICE — SOLUTION BOWL: Brand: KENDALL

## (undated) DEVICE — GUIDEWIRE STRGHT TIP 0.038 IN SOLO PLUS

## (undated) DEVICE — GLOVE SRG LF STRL BGL SKNSNS 8 PF

## (undated) DEVICE — SINGLE-USE DIGITAL FLEXIBLE URETEROSCOPE: Brand: APTRA

## (undated) DEVICE — SHEATH URETERAL ACCESS 10/12FR 35CM PROXIS

## (undated) DEVICE — CYSTOSCOPY PACK: Brand: CONVERTORS

## (undated) DEVICE — SPECIMEN CONTAINER STERILE PEEL PACK

## (undated) DEVICE — SHEATH URETERAL ACCESS 12/14FR 45CM PROXIS

## (undated) DEVICE — SEAL BIOPSY PORT ADJUST F/ACCESSORIES UP TO 3FR

## (undated) DEVICE — SPONGE STICK WITH PVP-I: Brand: KENDALL

## (undated) DEVICE — NGAGE, NITINOL STONE EXTRACTOR: Brand: NGAGE

## (undated) DEVICE — LUBRICANT JELLY SURGILUBE TUBE 4OZ FLIP TOP

## (undated) DEVICE — FIBER STD QUANTA 272 MICRON

## (undated) DEVICE — URETERAL CATHETER 5 FR POLLACK